# Patient Record
Sex: FEMALE | Race: OTHER | HISPANIC OR LATINO
[De-identification: names, ages, dates, MRNs, and addresses within clinical notes are randomized per-mention and may not be internally consistent; named-entity substitution may affect disease eponyms.]

---

## 2017-01-11 ENCOUNTER — APPOINTMENT (OUTPATIENT)
Dept: OBGYN | Facility: CLINIC | Age: 27
End: 2017-01-11

## 2017-01-11 ENCOUNTER — OUTPATIENT (OUTPATIENT)
Dept: OUTPATIENT SERVICES | Facility: HOSPITAL | Age: 27
LOS: 1 days | End: 2017-01-11
Payer: MEDICAID

## 2017-01-11 DIAGNOSIS — Z01.419 ENCOUNTER FOR GYNECOLOGICAL EXAMINATION (GENERAL) (ROUTINE) WITHOUT ABNORMAL FINDINGS: ICD-10-CM

## 2017-01-11 DIAGNOSIS — N76.0 ACUTE VAGINITIS: ICD-10-CM

## 2017-01-11 PROCEDURE — G0463: CPT

## 2017-02-13 ENCOUNTER — TRANSCRIPTION ENCOUNTER (OUTPATIENT)
Age: 27
End: 2017-02-13

## 2017-04-17 ENCOUNTER — RX RENEWAL (OUTPATIENT)
Age: 27
End: 2017-04-17

## 2017-05-04 ENCOUNTER — APPOINTMENT (OUTPATIENT)
Dept: OPHTHALMOLOGY | Facility: CLINIC | Age: 27
End: 2017-05-04

## 2017-05-09 ENCOUNTER — RX RENEWAL (OUTPATIENT)
Age: 27
End: 2017-05-09

## 2017-05-10 ENCOUNTER — RX RENEWAL (OUTPATIENT)
Age: 27
End: 2017-05-10

## 2017-06-14 ENCOUNTER — RX RENEWAL (OUTPATIENT)
Age: 27
End: 2017-06-14

## 2017-06-26 ENCOUNTER — RX RENEWAL (OUTPATIENT)
Age: 27
End: 2017-06-26

## 2017-06-29 ENCOUNTER — OUTPATIENT (OUTPATIENT)
Dept: OUTPATIENT SERVICES | Facility: HOSPITAL | Age: 27
LOS: 1 days | End: 2017-06-29
Payer: MEDICAID

## 2017-06-29 ENCOUNTER — APPOINTMENT (OUTPATIENT)
Dept: ENDOCRINOLOGY | Facility: HOSPITAL | Age: 27
End: 2017-06-29

## 2017-06-29 VITALS
HEART RATE: 78 BPM | BODY MASS INDEX: 33.13 KG/M2 | HEIGHT: 63 IN | WEIGHT: 187 LBS | RESPIRATION RATE: 14 BRPM | DIASTOLIC BLOOD PRESSURE: 81 MMHG | SYSTOLIC BLOOD PRESSURE: 116 MMHG

## 2017-06-29 DIAGNOSIS — E11.65 TYPE 2 DIABETES MELLITUS WITH HYPERGLYCEMIA: ICD-10-CM

## 2017-06-29 DIAGNOSIS — E06.9 THYROIDITIS, UNSPECIFIED: ICD-10-CM

## 2017-06-29 LAB
CHOLEST SERPL-MCNC: 206 MG/DL — HIGH (ref 10–199)
GLUCOSE BLDC GLUCOMTR-MCNC: 269
HBA1C BLD-MCNC: 11.2 % — HIGH (ref 4–5.6)
HDLC SERPL-MCNC: 40 MG/DL — SIGNIFICANT CHANGE UP (ref 40–125)
LIPID PNL WITH DIRECT LDL SERPL: 121 MG/DL — SIGNIFICANT CHANGE UP
TOTAL CHOLESTEROL/HDL RATIO MEASUREMENT: 5.2 RATIO — SIGNIFICANT CHANGE UP (ref 3.3–7.1)
TRIGL SERPL-MCNC: 225 MG/DL — HIGH (ref 10–149)

## 2017-06-29 PROCEDURE — G0463: CPT

## 2017-06-29 PROCEDURE — 80061 LIPID PANEL: CPT

## 2017-06-29 PROCEDURE — 83036 HEMOGLOBIN GLYCOSYLATED A1C: CPT

## 2017-06-29 PROCEDURE — 82962 GLUCOSE BLOOD TEST: CPT

## 2017-06-29 PROCEDURE — 83498 ASY HYDROXYPROGESTERONE 17-D: CPT

## 2017-06-29 PROCEDURE — 82306 VITAMIN D 25 HYDROXY: CPT

## 2017-06-29 PROCEDURE — 36415 COLL VENOUS BLD VENIPUNCTURE: CPT

## 2017-06-30 LAB — 24R-OH-CALCIDIOL SERPL-MCNC: 34.7 NG/ML — SIGNIFICANT CHANGE UP (ref 30–100)

## 2017-07-05 DIAGNOSIS — E11.9 TYPE 2 DIABETES MELLITUS WITHOUT COMPLICATIONS: ICD-10-CM

## 2017-07-05 DIAGNOSIS — E25.9 ADRENOGENITAL DISORDER, UNSPECIFIED: ICD-10-CM

## 2017-07-06 ENCOUNTER — APPOINTMENT (OUTPATIENT)
Dept: OPHTHALMOLOGY | Facility: CLINIC | Age: 27
End: 2017-07-06

## 2017-07-06 ENCOUNTER — OUTPATIENT (OUTPATIENT)
Dept: OUTPATIENT SERVICES | Facility: HOSPITAL | Age: 27
LOS: 1 days | End: 2017-07-06

## 2017-07-11 LAB — 17OHP SERPL-MCNC: 232 NG/DL — SIGNIFICANT CHANGE UP

## 2017-08-03 ENCOUNTER — APPOINTMENT (OUTPATIENT)
Dept: OPHTHALMOLOGY | Facility: CLINIC | Age: 27
End: 2017-08-03

## 2017-08-07 DIAGNOSIS — H52.10 MYOPIA, UNSPECIFIED EYE: ICD-10-CM

## 2017-09-28 ENCOUNTER — APPOINTMENT (OUTPATIENT)
Dept: ENDOCRINOLOGY | Facility: HOSPITAL | Age: 27
End: 2017-09-28
Payer: MEDICAID

## 2017-09-28 ENCOUNTER — RESULT CHARGE (OUTPATIENT)
Age: 27
End: 2017-09-28

## 2017-09-28 ENCOUNTER — OUTPATIENT (OUTPATIENT)
Dept: OUTPATIENT SERVICES | Facility: HOSPITAL | Age: 27
LOS: 1 days | End: 2017-09-28
Payer: MEDICAID

## 2017-09-28 VITALS
HEART RATE: 80 BPM | WEIGHT: 196 LBS | BODY MASS INDEX: 34.73 KG/M2 | DIASTOLIC BLOOD PRESSURE: 73 MMHG | HEIGHT: 63 IN | SYSTOLIC BLOOD PRESSURE: 119 MMHG | RESPIRATION RATE: 16 BRPM

## 2017-09-28 DIAGNOSIS — E06.9 THYROIDITIS, UNSPECIFIED: ICD-10-CM

## 2017-09-28 DIAGNOSIS — E78.1 PURE HYPERGLYCERIDEMIA: ICD-10-CM

## 2017-09-28 LAB
GLUCOSE BLDC GLUCOMTR-MCNC: 226
HBA1C BLD-MCNC: 11 % — HIGH (ref 4–5.6)

## 2017-09-28 PROCEDURE — 36415 COLL VENOUS BLD VENIPUNCTURE: CPT

## 2017-09-28 PROCEDURE — 83036 HEMOGLOBIN GLYCOSYLATED A1C: CPT

## 2017-09-28 PROCEDURE — 99215 OFFICE O/P EST HI 40 MIN: CPT | Mod: GC

## 2017-09-28 PROCEDURE — 82962 GLUCOSE BLOOD TEST: CPT

## 2017-09-28 PROCEDURE — G0463: CPT

## 2017-09-29 DIAGNOSIS — E78.00 PURE HYPERCHOLESTEROLEMIA, UNSPECIFIED: ICD-10-CM

## 2017-09-29 DIAGNOSIS — E25.0 CONGENITAL ADRENOGENITAL DISORDERS ASSOCIATED WITH ENZYME DEFICIENCY: ICD-10-CM

## 2017-09-29 DIAGNOSIS — E11.65 TYPE 2 DIABETES MELLITUS WITH HYPERGLYCEMIA: ICD-10-CM

## 2017-12-21 ENCOUNTER — RESULT CHARGE (OUTPATIENT)
Age: 27
End: 2017-12-21

## 2017-12-21 ENCOUNTER — OUTPATIENT (OUTPATIENT)
Dept: OUTPATIENT SERVICES | Facility: HOSPITAL | Age: 27
LOS: 1 days | End: 2017-12-21
Payer: MEDICAID

## 2017-12-21 ENCOUNTER — LABORATORY RESULT (OUTPATIENT)
Age: 27
End: 2017-12-21

## 2017-12-21 ENCOUNTER — APPOINTMENT (OUTPATIENT)
Dept: ENDOCRINOLOGY | Facility: HOSPITAL | Age: 27
End: 2017-12-21
Payer: MEDICAID

## 2017-12-21 VITALS
BODY MASS INDEX: 35.44 KG/M2 | DIASTOLIC BLOOD PRESSURE: 77 MMHG | HEIGHT: 63 IN | WEIGHT: 200 LBS | SYSTOLIC BLOOD PRESSURE: 121 MMHG | HEART RATE: 75 BPM

## 2017-12-21 DIAGNOSIS — E06.9 THYROIDITIS, UNSPECIFIED: ICD-10-CM

## 2017-12-21 DIAGNOSIS — E78.00 PURE HYPERCHOLESTEROLEMIA, UNSPECIFIED: ICD-10-CM

## 2017-12-21 DIAGNOSIS — E25.0 CONGENITAL ADRENOGENITAL DISORDERS ASSOCIATED WITH ENZYME DEFICIENCY: ICD-10-CM

## 2017-12-21 DIAGNOSIS — E66.01 MORBID (SEVERE) OBESITY DUE TO EXCESS CALORIES: ICD-10-CM

## 2017-12-21 DIAGNOSIS — E11.65 TYPE 2 DIABETES MELLITUS WITH HYPERGLYCEMIA: ICD-10-CM

## 2017-12-21 LAB
GLUCOSE BLDC GLUCOMTR-MCNC: 92
GLUCOSE BLDC GLUCOMTR-MCNC: 92 MG/DL — SIGNIFICANT CHANGE UP (ref 70–99)

## 2017-12-21 PROCEDURE — 99214 OFFICE O/P EST MOD 30 MIN: CPT | Mod: GC

## 2017-12-21 PROCEDURE — G0463: CPT

## 2017-12-21 PROCEDURE — 82962 GLUCOSE BLOOD TEST: CPT

## 2018-02-23 ENCOUNTER — MEDICATION RENEWAL (OUTPATIENT)
Age: 28
End: 2018-02-23

## 2018-03-29 ENCOUNTER — OUTPATIENT (OUTPATIENT)
Dept: OUTPATIENT SERVICES | Facility: HOSPITAL | Age: 28
LOS: 1 days | End: 2018-03-29
Payer: MEDICAID

## 2018-03-29 ENCOUNTER — APPOINTMENT (OUTPATIENT)
Dept: INTERNAL MEDICINE | Facility: CLINIC | Age: 28
End: 2018-03-29
Payer: MEDICAID

## 2018-03-29 VITALS
DIASTOLIC BLOOD PRESSURE: 70 MMHG | WEIGHT: 205 LBS | BODY MASS INDEX: 36.32 KG/M2 | HEIGHT: 63 IN | SYSTOLIC BLOOD PRESSURE: 120 MMHG

## 2018-03-29 DIAGNOSIS — I10 ESSENTIAL (PRIMARY) HYPERTENSION: ICD-10-CM

## 2018-03-29 LAB
BILIRUB UR QL STRIP: NORMAL
CLARITY UR: CLEAR
COLLECTION METHOD: NORMAL
GLUCOSE UR-MCNC: NORMAL
HBA1C MFR BLD HPLC: 7.6
HCG UR QL: 0.2 EU/DL
HGB UR QL STRIP.AUTO: NORMAL
KETONES UR-MCNC: NORMAL
LEUKOCYTE ESTERASE UR QL STRIP: NEGATIVE
NITRITE UR QL STRIP: POSITIVE
PH UR STRIP: 6
PROT UR STRIP-MCNC: 30
SP GR UR STRIP: 1.02

## 2018-03-29 PROCEDURE — 99213 OFFICE O/P EST LOW 20 MIN: CPT | Mod: GE

## 2018-03-29 PROCEDURE — G0463: CPT

## 2018-03-29 PROCEDURE — 83036 HEMOGLOBIN GLYCOSYLATED A1C: CPT

## 2018-03-29 PROCEDURE — 87186 SC STD MICRODIL/AGAR DIL: CPT

## 2018-03-29 PROCEDURE — 81003 URINALYSIS AUTO W/O SCOPE: CPT

## 2018-03-29 PROCEDURE — 87086 URINE CULTURE/COLONY COUNT: CPT

## 2018-03-30 ENCOUNTER — LABORATORY RESULT (OUTPATIENT)
Age: 28
End: 2018-03-30

## 2018-04-04 DIAGNOSIS — R39.9 UNSPECIFIED SYMPTOMS AND SIGNS INVOLVING THE GENITOURINARY SYSTEM: ICD-10-CM

## 2018-04-04 DIAGNOSIS — E25.0 CONGENITAL ADRENOGENITAL DISORDERS ASSOCIATED WITH ENZYME DEFICIENCY: ICD-10-CM

## 2018-04-04 DIAGNOSIS — E66.01 MORBID (SEVERE) OBESITY DUE TO EXCESS CALORIES: ICD-10-CM

## 2018-04-04 DIAGNOSIS — E11.65 TYPE 2 DIABETES MELLITUS WITH HYPERGLYCEMIA: ICD-10-CM

## 2018-05-01 ENCOUNTER — MEDICATION RENEWAL (OUTPATIENT)
Age: 28
End: 2018-05-01

## 2018-08-17 ENCOUNTER — APPOINTMENT (OUTPATIENT)
Dept: INTERNAL MEDICINE | Facility: CLINIC | Age: 28
End: 2018-08-17

## 2018-09-05 ENCOUNTER — APPOINTMENT (OUTPATIENT)
Dept: OBGYN | Facility: CLINIC | Age: 28
End: 2018-09-05
Payer: MEDICAID

## 2018-09-05 ENCOUNTER — RESULT CHARGE (OUTPATIENT)
Age: 28
End: 2018-09-05

## 2018-09-05 ENCOUNTER — OUTPATIENT (OUTPATIENT)
Dept: OUTPATIENT SERVICES | Facility: HOSPITAL | Age: 28
LOS: 1 days | End: 2018-09-05
Payer: MEDICAID

## 2018-09-05 ENCOUNTER — LABORATORY RESULT (OUTPATIENT)
Age: 28
End: 2018-09-05

## 2018-09-05 VITALS — SYSTOLIC BLOOD PRESSURE: 120 MMHG | WEIGHT: 195 LBS | DIASTOLIC BLOOD PRESSURE: 76 MMHG | BODY MASS INDEX: 34.54 KG/M2

## 2018-09-05 DIAGNOSIS — R39.9 UNSPECIFIED SYMPTOMS AND SIGNS INVOLVING THE GENITOURINARY SYSTEM: ICD-10-CM

## 2018-09-05 DIAGNOSIS — Z30.09 ENCOUNTER FOR OTHER GENERAL COUNSELING AND ADVICE ON CONTRACEPTION: ICD-10-CM

## 2018-09-05 DIAGNOSIS — N76.0 ACUTE VAGINITIS: ICD-10-CM

## 2018-09-05 DIAGNOSIS — Z86.39 PERSONAL HISTORY OF OTHER ENDOCRINE, NUTRITIONAL AND METABOLIC DISEASE: ICD-10-CM

## 2018-09-05 DIAGNOSIS — Z83.3 FAMILY HISTORY OF DIABETES MELLITUS: ICD-10-CM

## 2018-09-05 DIAGNOSIS — Z01.419 ENCOUNTER FOR GYNECOLOGICAL EXAMINATION (GENERAL) (ROUTINE) W/OUT ABNORMAL FINDINGS: ICD-10-CM

## 2018-09-05 PROCEDURE — 81003 URINALYSIS AUTO W/O SCOPE: CPT

## 2018-09-05 PROCEDURE — 99395 PREV VISIT EST AGE 18-39: CPT | Mod: GC,25

## 2018-09-05 PROCEDURE — G0463: CPT

## 2018-09-05 PROCEDURE — 81003 URINALYSIS AUTO W/O SCOPE: CPT | Mod: GC,QW

## 2018-09-08 LAB — CYTOLOGY SPEC DOC CYTO: SIGNIFICANT CHANGE UP

## 2018-09-15 DIAGNOSIS — Z01.419 ENCOUNTER FOR GYNECOLOGICAL EXAMINATION (GENERAL) (ROUTINE) WITHOUT ABNORMAL FINDINGS: ICD-10-CM

## 2018-09-15 DIAGNOSIS — R39.9 UNSPECIFIED SYMPTOMS AND SIGNS INVOLVING THE GENITOURINARY SYSTEM: ICD-10-CM

## 2018-10-11 ENCOUNTER — OUTPATIENT (OUTPATIENT)
Dept: OUTPATIENT SERVICES | Facility: HOSPITAL | Age: 28
LOS: 1 days | End: 2018-10-11
Payer: MEDICAID

## 2018-10-11 ENCOUNTER — APPOINTMENT (OUTPATIENT)
Dept: ENDOCRINOLOGY | Facility: HOSPITAL | Age: 28
End: 2018-10-11
Payer: MEDICAID

## 2018-10-11 VITALS
DIASTOLIC BLOOD PRESSURE: 78 MMHG | HEART RATE: 85 BPM | RESPIRATION RATE: 16 BRPM | SYSTOLIC BLOOD PRESSURE: 115 MMHG | WEIGHT: 194 LBS | HEIGHT: 63 IN | BODY MASS INDEX: 34.38 KG/M2

## 2018-10-11 DIAGNOSIS — E06.9 THYROIDITIS, UNSPECIFIED: ICD-10-CM

## 2018-10-11 LAB
ALBUMIN SERPL ELPH-MCNC: 4.4 G/DL — SIGNIFICANT CHANGE UP (ref 3.3–5)
ALP SERPL-CCNC: 56 U/L — SIGNIFICANT CHANGE UP (ref 30–120)
ALT FLD-CCNC: 33 U/L — SIGNIFICANT CHANGE UP (ref 10–45)
ANION GAP SERPL CALC-SCNC: 14 MMOL/L — SIGNIFICANT CHANGE UP (ref 5–17)
AST SERPL-CCNC: 33 U/L — SIGNIFICANT CHANGE UP (ref 10–40)
BILIRUB SERPL-MCNC: 0.4 MG/DL — SIGNIFICANT CHANGE UP (ref 0.2–1.2)
BUN SERPL-MCNC: 10 MG/DL — SIGNIFICANT CHANGE UP (ref 7–23)
CALCIUM SERPL-MCNC: 9.6 MG/DL — SIGNIFICANT CHANGE UP (ref 8.4–10.5)
CHLORIDE SERPL-SCNC: 102 MMOL/L — SIGNIFICANT CHANGE UP (ref 96–108)
CHOLEST SERPL-MCNC: 198 MG/DL — SIGNIFICANT CHANGE UP (ref 10–199)
CO2 SERPL-SCNC: 23 MMOL/L — SIGNIFICANT CHANGE UP (ref 22–31)
CREAT ?TM UR-MCNC: 453 MG/DL — SIGNIFICANT CHANGE UP
CREAT SERPL-MCNC: 0.78 MG/DL — SIGNIFICANT CHANGE UP (ref 0.5–1.3)
GLUCOSE BLDC GLUCOMTR-MCNC: 103
GLUCOSE BLDC GLUCOMTR-MCNC: 103 MG/DL — HIGH (ref 70–99)
GLUCOSE SERPL-MCNC: 96 MG/DL — SIGNIFICANT CHANGE UP (ref 70–99)
HBA1C BLD-MCNC: 11 % — HIGH (ref 4–5.6)
HDLC SERPL-MCNC: 47 MG/DL — LOW
LIPID PNL WITH DIRECT LDL SERPL: 116 MG/DL — SIGNIFICANT CHANGE UP
MICROALBUMIN UR-MCNC: 7 MG/DL — SIGNIFICANT CHANGE UP
MICROALBUMIN/CREAT UR-RTO: 15 MG/G — SIGNIFICANT CHANGE UP (ref 0–30)
POTASSIUM SERPL-MCNC: 3.8 MMOL/L — SIGNIFICANT CHANGE UP (ref 3.5–5.3)
POTASSIUM SERPL-SCNC: 3.8 MMOL/L — SIGNIFICANT CHANGE UP (ref 3.5–5.3)
PROT SERPL-MCNC: 8.1 G/DL — SIGNIFICANT CHANGE UP (ref 6–8.3)
SODIUM SERPL-SCNC: 139 MMOL/L — SIGNIFICANT CHANGE UP (ref 135–145)
TOTAL CHOLESTEROL/HDL RATIO MEASUREMENT: 4.2 RATIO — SIGNIFICANT CHANGE UP (ref 3.3–7.1)
TRIGL SERPL-MCNC: 176 MG/DL — HIGH (ref 10–149)
TSH SERPL-MCNC: 0.84 UIU/ML — SIGNIFICANT CHANGE UP (ref 0.27–4.2)

## 2018-10-11 PROCEDURE — 80061 LIPID PANEL: CPT

## 2018-10-11 PROCEDURE — 80053 COMPREHEN METABOLIC PANEL: CPT

## 2018-10-11 PROCEDURE — 83036 HEMOGLOBIN GLYCOSYLATED A1C: CPT

## 2018-10-11 PROCEDURE — 99214 OFFICE O/P EST MOD 30 MIN: CPT | Mod: GC

## 2018-10-11 PROCEDURE — G0463: CPT

## 2018-10-11 PROCEDURE — 84443 ASSAY THYROID STIM HORMONE: CPT

## 2018-10-11 PROCEDURE — 82043 UR ALBUMIN QUANTITATIVE: CPT

## 2018-10-11 PROCEDURE — 82962 GLUCOSE BLOOD TEST: CPT

## 2018-10-11 RX ORDER — SULFAMETHOXAZOLE AND TRIMETHOPRIM 400; 80 MG/1; MG/1
400-80 TABLET ORAL DAILY
Qty: 3 | Refills: 0 | Status: DISCONTINUED | COMMUNITY
Start: 2018-09-05 | End: 2018-10-11

## 2018-10-11 RX ORDER — SULFAMETHOXAZOLE AND TRIMETHOPRIM 800; 160 MG/1; MG/1
800-160 TABLET ORAL
Qty: 6 | Refills: 0 | Status: DISCONTINUED | COMMUNITY
Start: 2018-04-06 | End: 2018-10-11

## 2018-10-12 ENCOUNTER — APPOINTMENT (OUTPATIENT)
Dept: INTERNAL MEDICINE | Facility: CLINIC | Age: 28
End: 2018-10-12

## 2018-10-12 DIAGNOSIS — E78.00 PURE HYPERCHOLESTEROLEMIA, UNSPECIFIED: ICD-10-CM

## 2018-10-12 DIAGNOSIS — E11.65 TYPE 2 DIABETES MELLITUS WITH HYPERGLYCEMIA: ICD-10-CM

## 2018-10-12 DIAGNOSIS — E25.0 CONGENITAL ADRENOGENITAL DISORDERS ASSOCIATED WITH ENZYME DEFICIENCY: ICD-10-CM

## 2019-01-06 ENCOUNTER — TRANSCRIPTION ENCOUNTER (OUTPATIENT)
Age: 29
End: 2019-01-06

## 2019-01-10 ENCOUNTER — OUTPATIENT (OUTPATIENT)
Dept: OUTPATIENT SERVICES | Facility: HOSPITAL | Age: 29
LOS: 1 days | End: 2019-01-10
Payer: MEDICAID

## 2019-01-10 ENCOUNTER — APPOINTMENT (OUTPATIENT)
Dept: ENDOCRINOLOGY | Facility: HOSPITAL | Age: 29
End: 2019-01-10
Payer: MEDICAID

## 2019-01-10 VITALS
SYSTOLIC BLOOD PRESSURE: 129 MMHG | BODY MASS INDEX: 34.38 KG/M2 | HEART RATE: 78 BPM | WEIGHT: 194 LBS | HEIGHT: 63 IN | DIASTOLIC BLOOD PRESSURE: 84 MMHG

## 2019-01-10 DIAGNOSIS — E11.65 TYPE 2 DIABETES MELLITUS WITH HYPERGLYCEMIA: ICD-10-CM

## 2019-01-10 DIAGNOSIS — E25.0 CONGENITAL ADRENOGENITAL DISORDERS ASSOCIATED WITH ENZYME DEFICIENCY: ICD-10-CM

## 2019-01-10 DIAGNOSIS — E66.01 MORBID (SEVERE) OBESITY DUE TO EXCESS CALORIES: ICD-10-CM

## 2019-01-10 DIAGNOSIS — E78.00 PURE HYPERCHOLESTEROLEMIA, UNSPECIFIED: ICD-10-CM

## 2019-01-10 LAB
GLUCOSE BLDC GLUCOMTR-MCNC: 165
GLUCOSE BLDC GLUCOMTR-MCNC: 165 MG/DL — HIGH (ref 70–99)
HBA1C BLD-MCNC: 10.6 % — HIGH (ref 4–5.6)

## 2019-01-10 PROCEDURE — G0463: CPT

## 2019-01-10 PROCEDURE — 99214 OFFICE O/P EST MOD 30 MIN: CPT | Mod: GC

## 2019-01-10 PROCEDURE — 83498 ASY HYDROXYPROGESTERONE 17-D: CPT

## 2019-01-10 PROCEDURE — 82962 GLUCOSE BLOOD TEST: CPT

## 2019-01-10 PROCEDURE — 83036 HEMOGLOBIN GLYCOSYLATED A1C: CPT

## 2019-01-10 RX ORDER — METFORMIN ER 500 MG 500 MG/1
500 TABLET ORAL
Qty: 120 | Refills: 3 | Status: ACTIVE | COMMUNITY
Start: 2019-01-10 | End: 1900-01-01

## 2019-01-11 NOTE — HISTORY OF PRESENT ILLNESS
[FreeTextEntry1] : 28 year old woman with T2DM (ketosis prone), Morbid Obesity, NAFLD, NC-CAH presents for follow up.\par \par DM 2- ketosis prone, no complications. Dx at age 21. No complications. Was [facility] in Feb 2015 [facility] with [facility]; A1C then was 16%; had stopped taking her Lantus. At last visit she was on Novolin 70/30 35u with breakfast and 25u with dinner. March 2018 A1C 7.6. She was started on Trulicity 1.5mg weekly. In Oct 2018 A1C 11, She was asked to increase Novolin 70/30 to 40U in AM and 30U in PM. \par FS in -200s Before dinner 180-190s. No hypoglycemia.\par Last ophthal July 2018- no retinopathy. No neuropathy or nephropathy. No macrovascular complications.\par Lost 9 pounds since March. Has started running with her coworker.  \par Patients dad has prostate CA and is trying to gain weight. He does the cooking and she usually has meals that are very carb heavy. \par breakfast:  egg sandwich, cereal\par lunch: chicken noodle soup, rice, chicken\par dinner: red meat, pasta\par No soda or fruit juice. \par BP controlled. Micro/Creat normal in Oct 2018.\par Hyperlipidemia -   June 2017 while on atorvastatin- it was increased to 40mg daily. She says she has not been taking it. \par The patient was diagnosed with NC-CAH by Stim Test in 5/2012 (17-OH after stim >2300). Currently on OCPs for hirsutism and regulation of period. Regular periods. Had last period last week.  \par 17  June 2017.\par Utilizes condoms as well. No plans to get pregnant at this time.

## 2019-01-11 NOTE — ASSESSMENT
[Carbohydrate Consistent Diet] : carbohydrate consistent diet [Ketone Testing] : ketone testing [Diabetes Foot Care] : diabetes foot care [Self Monitoring of Blood Glucose] : self monitoring of blood glucose [Retinopathy Screening] : Patient was referred to ophthalmology for retinopathy screening [FreeTextEntry1] : 29 yo woman with Type 2DM (ketosis prone), obesity and NC-CAH\par \par 1.  Uncontrolled diabetes A1C 11% in Oct 2018\par - Patient is not on any medications to address insulin resistance. \par - She could not tolerate regular metformin in the past but has never tried the ER formulation\par -Will start metformin 500mg ER daily. If tolerating can increase by 500mg every week to maximum dose of 2 tabs (500mg) BID. \par -Continue diet and exercise as recommended.\par -Will check repeat HgA1C today. \par -Increase Novolin 70/30 to 42U in AM and 32U in PM and Trulicity 1.5mg weekly.\par -Check FS and bring meter to next visit. \par \par 2) HTN: Blood pressure at goal, Oct 2018 Micro/Creat normal\par \par 2. HLD\par -Patient was told to restart atorvastatin 40mg daily. Check lipid panel at next visit. \par -She is also on OCP and utilizes condoms. DIscussed that statin is contraindicated in pregnancy and to let us know when she decides to start family planning.\par \par 3. NC-CAH\par -Hirsutism controlled and menstrual periods are regular. Continue Loestrin.  Does not desire pregnancy right now.\par -Check 17OH progesterone. Expect 17OH >200 in [facility]\par  \par \par Return in 3 months, sooner as needed

## 2019-01-11 NOTE — REVIEW OF SYSTEMS
[Polydipsia] : polydipsia [Negative] : Heme/Lymph [All other systems negative] : All other systems negative [Fatigue] : no fatigue [Decreased Appetite] : appetite not decreased [Dry Eyes] : no dryness of the eyes [Eyes Itch] : no itching of the eyes [Shortness Of Breath] : no shortness of breath [Wheezing] : no wheezing was heard

## 2019-01-11 NOTE — PHYSICAL EXAM
[Alert] : alert [No Acute Distress] : no acute distress [Well Nourished] : well nourished [Well Developed] : well developed [Normal Sclera/Conjunctiva] : normal sclera/conjunctiva [EOMI] : extra ocular movement intact [No Proptosis] : no proptosis [Normal Hearing] : hearing was normal [Thyroid Not Enlarged] : the thyroid was not enlarged [No Thyroid Nodules] : there were no palpable thyroid nodules [No Respiratory Distress] : no respiratory distress [Normal Rate and Effort] : normal respiratory rhythm and effort [No Accessory Muscle Use] : no accessory muscle use [Clear to Auscultation] : lungs were clear to auscultation bilaterally [Normal Rate] : heart rate was normal  [Normal S1, S2] : normal S1 and S2 [Regular Rhythm] : with a regular rhythm [Pedal Pulses Normal] : the pedal pulses are present [No Edema] : there was no peripheral edema [Normal Bowel Sounds] : normal bowel sounds [Not Tender] : non-tender [Soft] : abdomen soft [No Stigmata of Cushings Syndrome] : no stigmata of cushings syndrome [No Clubbing, Cyanosis] : no clubbing  or cyanosis of the fingernails [No Rash] : no rash [No Motor Deficits] : the motor exam was normal [No Tremors] : no tremors [Oriented x3] : oriented to person, place, and time [Normal Affect] : the affect was normal [Normal Mood] : the mood was normal [Foot Ulcers] : no foot ulcers [Acanthosis Nigricans] : acanthosis nigricans

## 2019-01-12 LAB — 17OHP SERPL-MCNC: 444 NG/DL — SIGNIFICANT CHANGE UP

## 2019-02-21 LAB
BACTERIA UR CULT: ABNORMAL
BILIRUB UR QL STRIP: NORMAL
GLUCOSE UR-MCNC: 500
HCG UR QL: 0.2 EU/DL
HGB UR QL STRIP.AUTO: NORMAL
KETONES UR-MCNC: NORMAL
LEUKOCYTE ESTERASE UR QL STRIP: NORMAL
NITRITE UR QL STRIP: POSITIVE
PH UR STRIP: 5
PROT UR STRIP-MCNC: 30
SP GR UR STRIP: 1.02

## 2019-04-16 ENCOUNTER — TRANSCRIPTION ENCOUNTER (OUTPATIENT)
Age: 29
End: 2019-04-16

## 2019-04-18 ENCOUNTER — APPOINTMENT (OUTPATIENT)
Dept: ENDOCRINOLOGY | Facility: HOSPITAL | Age: 29
End: 2019-04-18

## 2019-04-30 ENCOUNTER — EMERGENCY (EMERGENCY)
Facility: HOSPITAL | Age: 29
LOS: 1 days | Discharge: HOME | End: 2019-04-30
Payer: MEDICAID

## 2019-04-30 VITALS
OXYGEN SATURATION: 99 % | WEIGHT: 197.09 LBS | TEMPERATURE: 98 F | HEIGHT: 63 IN | DIASTOLIC BLOOD PRESSURE: 86 MMHG | SYSTOLIC BLOOD PRESSURE: 140 MMHG | RESPIRATION RATE: 18 BRPM | HEART RATE: 86 BPM

## 2019-04-30 LAB
APPEARANCE UR: CLEAR — SIGNIFICANT CHANGE UP
BACTERIA # UR AUTO: ABNORMAL
BILIRUB UR-MCNC: NEGATIVE — SIGNIFICANT CHANGE UP
COLOR SPEC: SIGNIFICANT CHANGE UP
DIFF PNL FLD: NEGATIVE — SIGNIFICANT CHANGE UP
EPI CELLS # UR: 6 — HIGH
GLUCOSE UR QL: ABNORMAL
HYALINE CASTS # UR AUTO: 1 /LPF — SIGNIFICANT CHANGE UP (ref 0–7)
KETONES UR-MCNC: NEGATIVE — SIGNIFICANT CHANGE UP
LEUKOCYTE ESTERASE UR-ACNC: NEGATIVE — SIGNIFICANT CHANGE UP
NITRITE UR-MCNC: POSITIVE
PH UR: 6 — SIGNIFICANT CHANGE UP (ref 5–8)
PROT UR-MCNC: NEGATIVE — SIGNIFICANT CHANGE UP
RBC CASTS # UR COMP ASSIST: 1 /HPF — SIGNIFICANT CHANGE UP (ref 0–4)
SP GR SPEC: 1.04 — HIGH (ref 1.01–1.02)
UROBILINOGEN FLD QL: NEGATIVE — SIGNIFICANT CHANGE UP
WBC UR QL: 2 /HPF — SIGNIFICANT CHANGE UP (ref 0–5)

## 2019-04-30 PROCEDURE — 99283 EMERGENCY DEPT VISIT LOW MDM: CPT

## 2019-04-30 RX ORDER — OXYCODONE AND ACETAMINOPHEN 5; 325 MG/1; MG/1
1 TABLET ORAL ONCE
Qty: 0 | Refills: 0 | Status: DISCONTINUED | OUTPATIENT
Start: 2019-04-30 | End: 2019-04-30

## 2019-04-30 RX ORDER — LIDOCAINE 4 G/100G
1 CREAM TOPICAL ONCE
Qty: 0 | Refills: 0 | Status: COMPLETED | OUTPATIENT
Start: 2019-04-30 | End: 2019-04-30

## 2019-04-30 RX ADMIN — OXYCODONE AND ACETAMINOPHEN 1 TABLET(S): 5; 325 TABLET ORAL at 23:40

## 2019-04-30 RX ADMIN — LIDOCAINE 1 PATCH: 4 CREAM TOPICAL at 21:46

## 2019-04-30 NOTE — ED PROVIDER NOTE - PLAN OF CARE
1. Follow with your PMD within 1-2 days. If your pain persists there are spine specialists within the [facility] system you may call [phone] for your back pain, call and arrange your follow up appointment. They may perform further outpatient imaging as clinically warranted.  2. Rest, no heavy lifting.  Warm compresses to area. Perform light walking and gentle stretching exercises. Take Motrin 600 mg every 8 hours for pain with food. For severe breakthru pain take 1 tab Percocet every 6 hours as needed. CAUTION: Percocet contains tylenol so do not take additional tylenol while taking this medication as the maximum daily amount of tylenol is 4000 mg. Additionally Percocet contains oxycodone which can cause drowsiness so do not drive, drink alcohol or operate machinery while taking this medication.   3. Take Keflex as prescribed for your UTI.  4. If you develop any worsening pain, weakness, numbness/tingling, bowel or urinary incontinence, flank pain or any other concerning symptoms please return to the ED immediately.

## 2019-04-30 NOTE — ED PROVIDER NOTE - MUSCULOSKELETAL, MLM
Spine appears normal, range of motion is not limited, no muscle or joint tenderness. No ttp of bony hip, femur or knee. Full AROM at all joints with 5/5 strength. Spine appears normal, range of motion is not limited, no muscle or joint tenderness. No midline spinal tenderness. +mild ttp over L piriformis muscle. No ttp of bony hip, femur or knee. Full AROM at all joints with 5/5 strength.

## 2019-04-30 NOTE — ED PROVIDER NOTE - OBJECTIVE STATEMENT
27 yo female PMhx diabetes presents to the ED c/o right buttock and low back pain w/ radiation down right leg that started on 4/13. Pt cannot recall any inciting injury that prompted onset of pain. 29 yo female PMhx diabetes presents to the ED c/o left buttock and low back pain w/ radiation down left leg that started on 4/13. Pt cannot recall any inciting injury that prompted onset of pain. Pt went to urgent care initially at onset of pain, was prescribed muscle relaxers and anti-inflammatories which improved the pain, however she ran out 3 days ago and pain came back worse than before. Does endorse sitting long periods of sitting down at her job. Pain worsened with sitting and lying down, alleviated with standing and walking. Occasional numbness down back of leg to feet. Denies fall, trauma, weakness, bowel/bladder incontinence, saddle anesthesia, fever/chills, cp, sob, rash, urinary urgency, frequency, dysuria, abdominal pain.

## 2019-04-30 NOTE — ED PROVIDER NOTE - CLINICAL SUMMARY MEDICAL DECISION MAKING FREE TEXT BOX
Impression:  lumbar back pain more consistent with sciatica due to herniated disc w/o H&P features of a more serious etiology such as cord injury, DIANE, Ao pathology, ACS, or acute intraabdominal pathology.    Plan:  pain control with NSAIDs, muscle relaxants, PMD f/u with outpatient MRI if pain persists, strict return precautions.

## 2019-04-30 NOTE — ED PROVIDER NOTE - ATTENDING CONTRIBUTION TO CARE
MD Rahman:  patient seen and evaluated with the PA.  I was present for key portions of the History and Physical, and I agree with the Impression and Plan.    MD Rahman:  27 yo F, c/o L lumbar paraspinal pain that radiates down the L posterior leg.  Duration 2wks.  Onset while bending and twisting.  Better: laying on stomach.  Worse: sneezing/coughing/laughing.  Associated Sx:  No weakness/numbness, no bowel/bladder incontinence, no urinary hesitancy, no saddle anaesthesia, no CP/SOB, no abdominal pain, & no fever/chills.  VS: wnl. Physical Exam: adult F, mild distress, NCAT, PERRL, EOMI, neck supple, CTA B, RRR, Abd: s/nd/nt, Ext: no edema.  Spine: no midline pain, no step-offs, + L lumbar paraspinal muscle spasm,  Strength in BLE 5/5. MSK: no pain in hip with axial load.    Impression:  lumbar back pain more consistent with sciatica due to herniated disc w/o H&P features of a more serious etiology such as cord injury, DIANE, Ao pathology, ACS, or acute intraabdominal pathology.    Plan:  pain control with NSAIDs, muscle relaxants, PMD f/u with outpatient MRI if pain persists, strict return precautions.

## 2019-04-30 NOTE — ED PROVIDER NOTE - CARE PLAN
Principal Discharge DX:	Sciatica of left side  Assessment and plan of treatment:	1. Follow with your PMD within 1-2 days. If your pain persists there are spine specialists within the [facility] system you may call [phone] for your back pain, call and arrange your follow up appointment. They may perform further outpatient imaging as clinically warranted.  2. Rest, no heavy lifting.  Warm compresses to area. Perform light walking and gentle stretching exercises. Take Motrin 600 mg every 8 hours for pain with food. For severe breakthru pain take 1 tab Percocet every 6 hours as needed. CAUTION: Percocet contains tylenol so do not take additional tylenol while taking this medication as the maximum daily amount of tylenol is 4000 mg. Additionally Percocet contains oxycodone which can cause drowsiness so do not drive, drink alcohol or operate machinery while taking this medication.   3. Take Keflex as prescribed for your UTI.  4. If you develop any worsening pain, weakness, numbness/tingling, bowel or urinary incontinence, flank pain or any other concerning symptoms please return to the ED immediately.  Secondary Diagnosis:	Urinary tract infection without hematuria, site unspecified

## 2019-04-30 NOTE — ED PROVIDER NOTE - NSFOLLOWUPINSTRUCTIONS_ED_ALL_ED_FT
1. Follow with your PMD within 1-2 days. If your pain persists there are spine specialists within the [facility] system you may call [phone] for your back pain, call and arrange your follow up appointment. They may perform further outpatient imaging as clinically warranted.  2. Rest, no heavy lifting.  Warm compresses to area. Perform light walking and gentle stretching exercises. Take Motrin 600 mg every 8 hours for pain with food. For severe breakthru pain take 1 tab Percocet every 6 hours as needed. CAUTION: Percocet contains tylenol so do not take additional tylenol while taking this medication as the maximum daily amount of tylenol is 4000 mg. Additionally Percocet contains oxycodone which can cause drowsiness so do not drive, drink alcohol or operate machinery while taking this medication.   3. Take Keflex as prescribed for your UTI.  4. If you develop any worsening pain, weakness, numbness/tingling, bowel or urinary incontinence, flank pain or any other concerning symptoms please return to the ED immediately.

## 2019-04-30 NOTE — ED PROVIDER NOTE - PROGRESS NOTE DETAILS
Pt was offered analgesia at time of initial ED evaluation but refused. - Bay Chavez PA-C MD Rahman:  urine likely contaminated, will resend Pt has no dysuria, no flank pain, no fever/chills. Pain is worsened with movement. Suspicion for pyelo or kidney stone extremely low, however urinalysis x 2 show bacteruria so will treat. Will give first dose in ED and discharge home with keflex and percocet rx and strict return precautions.  Pt has ride home with family member who will drive her. - Bay Chavez PA-C Pt reports feeling much improved s/p percocet. Ambulating without difficulty and ROM markedly improved. Pt has no dysuria, no flank pain, no fever/chills. Pain is worsened with movement. Suspicion for pyelo or kidney stone extremely low, however urinalysis x 2 show bacteruria so will treat. Will give first dose in ED and discharge home with keflex and percocet rx and strict return precautions.  Pt has ride home with family member who will drive her. - Bay Chavez PA-C

## 2019-05-01 VITALS
SYSTOLIC BLOOD PRESSURE: 125 MMHG | OXYGEN SATURATION: 99 % | TEMPERATURE: 98 F | RESPIRATION RATE: 18 BRPM | HEART RATE: 67 BPM | DIASTOLIC BLOOD PRESSURE: 74 MMHG

## 2019-05-01 LAB
APPEARANCE UR: CLEAR — SIGNIFICANT CHANGE UP
BACTERIA # UR AUTO: ABNORMAL
BILIRUB UR-MCNC: NEGATIVE — SIGNIFICANT CHANGE UP
COLOR SPEC: SIGNIFICANT CHANGE UP
DIFF PNL FLD: NEGATIVE — SIGNIFICANT CHANGE UP
EPI CELLS # UR: 2 /HPF — SIGNIFICANT CHANGE UP
GLUCOSE UR QL: ABNORMAL
HYALINE CASTS # UR AUTO: 0 /LPF — SIGNIFICANT CHANGE UP (ref 0–2)
KETONES UR-MCNC: NEGATIVE — SIGNIFICANT CHANGE UP
LEUKOCYTE ESTERASE UR-ACNC: NEGATIVE — SIGNIFICANT CHANGE UP
NITRITE UR-MCNC: POSITIVE
PH UR: 5.5 — SIGNIFICANT CHANGE UP (ref 5–8)
PROT UR-MCNC: NEGATIVE — SIGNIFICANT CHANGE UP
RBC CASTS # UR COMP ASSIST: 3 /HPF — SIGNIFICANT CHANGE UP (ref 0–4)
SP GR SPEC: 1.04 — HIGH (ref 1.01–1.02)
UROBILINOGEN FLD QL: NEGATIVE — SIGNIFICANT CHANGE UP
WBC UR QL: 5 /HPF — SIGNIFICANT CHANGE UP (ref 0–5)

## 2019-05-01 PROCEDURE — 87186 SC STD MICRODIL/AGAR DIL: CPT

## 2019-05-01 PROCEDURE — 99284 EMERGENCY DEPT VISIT MOD MDM: CPT

## 2019-05-01 PROCEDURE — 81001 URINALYSIS AUTO W/SCOPE: CPT

## 2019-05-01 PROCEDURE — 87086 URINE CULTURE/COLONY COUNT: CPT

## 2019-05-01 RX ORDER — CEPHALEXIN 500 MG
1 CAPSULE ORAL
Qty: 10 | Refills: 0 | OUTPATIENT
Start: 2019-05-01 | End: 2019-05-05

## 2019-05-01 RX ORDER — CEPHALEXIN 500 MG
500 CAPSULE ORAL ONCE
Qty: 0 | Refills: 0 | Status: DISCONTINUED | OUTPATIENT
Start: 2019-05-01 | End: 2019-05-01

## 2019-05-01 RX ORDER — CEPHALEXIN 500 MG
500 CAPSULE ORAL ONCE
Qty: 0 | Refills: 0 | Status: COMPLETED | OUTPATIENT
Start: 2019-05-01 | End: 2019-05-01

## 2019-05-01 RX ADMIN — Medication 500 MILLIGRAM(S): at 00:54

## 2019-05-01 NOTE — ED ADULT NURSE NOTE - OBJECTIVE STATEMENT
28y female presents to ED complaining of back pain. Pt is a/ox4 with hx of sciatica was taking muscles relaxers and antiinflammatories since 4/13 with relief, but ran out of a prescription. Pt states pain is worse now than ever, better with ambulation and laying on stomach. Pt states the pain starts in lower back and radiates down L buttock and L leg with intermittent L leg numbness. Pt denies any trauma. Pt breathing spontaneous and unlabored with lungs clear to auscultation bilaterally. Pt skin is warm, dry and intact with no edema present. Pt abdomen soft, nontender, nondistended.. Pt PERRL, with equal strength bilaterally in upper and lower extremities with full sensation. Pt denies chest pain and SOB, denies n/v/d, denies fever/chills and cough, denies dysuria, denies  weakness.

## 2019-05-01 NOTE — ED ADULT NURSE NOTE - CHPI ED NUR SYMPTOMS NEG
no bowel dysfunction/no constipation/no difficulty bearing weight/no bladder dysfunction/no tingling/no neck tenderness/no motor function loss/no fatigue/no anorexia

## 2019-05-01 NOTE — ED ADULT NURSE NOTE - NSIMPLEMENTINTERV_GEN_ALL_ED
Implemented All Universal Safety Interventions:  [place] to call system. Call bell, personal items and telephone within reach. Instruct patient to call for assistance. Room bathroom lighting operational. Non-slip footwear when patient is off stretcher. Physically safe environment: no spills, clutter or unnecessary equipment. Stretcher in lowest position, wheels locked, appropriate side rails in place.

## 2019-05-02 ENCOUNTER — OUTPATIENT (OUTPATIENT)
Dept: OUTPATIENT SERVICES | Facility: HOSPITAL | Age: 29
LOS: 1 days | End: 2019-05-02
Payer: MEDICAID

## 2019-05-02 ENCOUNTER — APPOINTMENT (OUTPATIENT)
Dept: INTERNAL MEDICINE | Facility: CLINIC | Age: 29
End: 2019-05-02
Payer: MEDICAID

## 2019-05-02 ENCOUNTER — RESULT CHARGE (OUTPATIENT)
Age: 29
End: 2019-05-02

## 2019-05-02 VITALS
DIASTOLIC BLOOD PRESSURE: 80 MMHG | HEART RATE: 109 BPM | HEIGHT: 63 IN | OXYGEN SATURATION: 97 % | BODY MASS INDEX: 34.73 KG/M2 | WEIGHT: 196 LBS | SYSTOLIC BLOOD PRESSURE: 123 MMHG

## 2019-05-02 DIAGNOSIS — I10 ESSENTIAL (PRIMARY) HYPERTENSION: ICD-10-CM

## 2019-05-02 DIAGNOSIS — M54.42 LUMBAGO WITH SCIATICA, LEFT SIDE: ICD-10-CM

## 2019-05-02 LAB
-  AMIKACIN: SIGNIFICANT CHANGE UP
-  AMIKACIN: SIGNIFICANT CHANGE UP
-  AMPICILLIN/SULBACTAM: SIGNIFICANT CHANGE UP
-  AMPICILLIN/SULBACTAM: SIGNIFICANT CHANGE UP
-  AMPICILLIN: SIGNIFICANT CHANGE UP
-  AMPICILLIN: SIGNIFICANT CHANGE UP
-  AZTREONAM: SIGNIFICANT CHANGE UP
-  AZTREONAM: SIGNIFICANT CHANGE UP
-  CEFAZOLIN: SIGNIFICANT CHANGE UP
-  CEFAZOLIN: SIGNIFICANT CHANGE UP
-  CEFEPIME: SIGNIFICANT CHANGE UP
-  CEFEPIME: SIGNIFICANT CHANGE UP
-  CEFOXITIN: SIGNIFICANT CHANGE UP
-  CEFOXITIN: SIGNIFICANT CHANGE UP
-  CEFTRIAXONE: SIGNIFICANT CHANGE UP
-  CEFTRIAXONE: SIGNIFICANT CHANGE UP
-  CIPROFLOXACIN: SIGNIFICANT CHANGE UP
-  CIPROFLOXACIN: SIGNIFICANT CHANGE UP
-  ERTAPENEM: SIGNIFICANT CHANGE UP
-  ERTAPENEM: SIGNIFICANT CHANGE UP
-  GENTAMICIN: SIGNIFICANT CHANGE UP
-  GENTAMICIN: SIGNIFICANT CHANGE UP
-  IMIPENEM: SIGNIFICANT CHANGE UP
-  IMIPENEM: SIGNIFICANT CHANGE UP
-  LEVOFLOXACIN: SIGNIFICANT CHANGE UP
-  LEVOFLOXACIN: SIGNIFICANT CHANGE UP
-  MEROPENEM: SIGNIFICANT CHANGE UP
-  MEROPENEM: SIGNIFICANT CHANGE UP
-  NITROFURANTOIN: SIGNIFICANT CHANGE UP
-  NITROFURANTOIN: SIGNIFICANT CHANGE UP
-  PIPERACILLIN/TAZOBACTAM: SIGNIFICANT CHANGE UP
-  PIPERACILLIN/TAZOBACTAM: SIGNIFICANT CHANGE UP
-  TIGECYCLINE: SIGNIFICANT CHANGE UP
-  TIGECYCLINE: SIGNIFICANT CHANGE UP
-  TOBRAMYCIN: SIGNIFICANT CHANGE UP
-  TOBRAMYCIN: SIGNIFICANT CHANGE UP
-  TRIMETHOPRIM/SULFAMETHOXAZOLE: SIGNIFICANT CHANGE UP
-  TRIMETHOPRIM/SULFAMETHOXAZOLE: SIGNIFICANT CHANGE UP
CULTURE RESULTS: SIGNIFICANT CHANGE UP
CULTURE RESULTS: SIGNIFICANT CHANGE UP
METHOD TYPE: SIGNIFICANT CHANGE UP
METHOD TYPE: SIGNIFICANT CHANGE UP
ORGANISM # SPEC MICROSCOPIC CNT: SIGNIFICANT CHANGE UP
SPECIMEN SOURCE: SIGNIFICANT CHANGE UP
SPECIMEN SOURCE: SIGNIFICANT CHANGE UP

## 2019-05-02 PROCEDURE — 83036 HEMOGLOBIN GLYCOSYLATED A1C: CPT

## 2019-05-02 PROCEDURE — 99213 OFFICE O/P EST LOW 20 MIN: CPT | Mod: GE

## 2019-05-02 PROCEDURE — G0463: CPT

## 2019-05-02 NOTE — ED POST DISCHARGE NOTE - RESULT SUMMARY
UCx prelim + >100k GN rods; pt d/c on keflex, will f/u final results and sensitivities. - Haley Andrade PA-C

## 2019-05-03 LAB — HBA1C MFR BLD HPLC: 13.8

## 2019-05-03 NOTE — PHYSICAL EXAM
[No Focal Deficits] : no focal deficits [de-identified] : Patient in acute distress during examination, examination was carried out with patient in the prone position. [de-identified] : Able to walk on toes and on heels

## 2019-05-03 NOTE — REVIEW OF SYSTEMS
[Negative] : Constitutional [Back Pain] : back pain [FreeTextEntry2] : Patient in acute distress during examination, examination was carried out with patient in the prone position. [FreeTextEntry9] : Back pain [de-identified] : Numbness in the left toe

## 2019-05-03 NOTE — HISTORY OF PRESENT ILLNESS
[FreeTextEntry8] : 28 YOF with PMH of T2DM, obesity (BMI 35), and non-classical congenital adrenal hyperplasia presents for a follow up visit after going to the ED/urgent care center for left sided sciatica symptoms. She says that on April 13th, she started to feel back pain after sitting for a long period of time and the pain got worse and she went to an urgent care center. She was prescribed Motrin for the pain and went home with some relief but then the pain got even worse and she had to go to the ED on 4/30. In the ED, she was prescribed 30 tablets of Percocet which she has been taking 1x per day with relief of the pain. She states the pain has been the same and it is worse when sitting for long periods of time and then trying to stand. She denies fevers/chills, saddle anesthesia, urinary/fecal incontinence. Reports numbness in her left toe but no weakness.

## 2019-05-03 NOTE — ASSESSMENT
[FreeTextEntry1] : 28 YOF with PMH of T2DM, obesity (BMI 35), NC-CAH is following up for left sided sciatica symptoms.

## 2019-05-03 NOTE — PLAN
[FreeTextEntry1] : #Sciatica\par - pt with signs and symptoms of left sided sciatica with left toe numbness. No alarm symptoms\par - will hold off on imaging for now\par - referral given for spine surgery if needed\par - prescribed meloxicam 15mg PO [id number]\par \par #T2DM\par - POCT A1c 13%, fill f/u with endocrinology\par \par Case d/w Dr. Gabriel

## 2019-05-07 ENCOUNTER — TRANSCRIPTION ENCOUNTER (OUTPATIENT)
Age: 29
End: 2019-05-07

## 2019-05-08 DIAGNOSIS — M54.42 LUMBAGO WITH SCIATICA, LEFT SIDE: ICD-10-CM

## 2019-05-12 ENCOUNTER — OUTPATIENT (OUTPATIENT)
Dept: OUTPATIENT SERVICES | Facility: HOSPITAL | Age: 29
LOS: 1 days | End: 2019-05-12
Payer: MEDICAID

## 2019-05-12 ENCOUNTER — APPOINTMENT (OUTPATIENT)
Dept: MRI IMAGING | Facility: CLINIC | Age: 29
End: 2019-05-12
Payer: MEDICAID

## 2019-05-12 DIAGNOSIS — Z00.8 ENCOUNTER FOR OTHER GENERAL EXAMINATION: ICD-10-CM

## 2019-05-12 PROCEDURE — 72148 MRI LUMBAR SPINE W/O DYE: CPT

## 2019-05-12 PROCEDURE — 72148 MRI LUMBAR SPINE W/O DYE: CPT | Mod: 26

## 2019-08-13 ENCOUNTER — APPOINTMENT (OUTPATIENT)
Dept: OPHTHALMOLOGY | Facility: CLINIC | Age: 29
End: 2019-08-13

## 2019-11-06 RX ORDER — SYRINGE-NEEDLE,INSULIN,0.5 ML 28GX1/2"
28G X 1/2" SYRINGE, EMPTY DISPOSABLE MISCELLANEOUS
Qty: 1 | Refills: 5 | Status: ACTIVE | COMMUNITY
Start: 2019-05-23 | End: 1900-01-01

## 2019-11-20 ENCOUNTER — APPOINTMENT (OUTPATIENT)
Dept: OBGYN | Facility: CLINIC | Age: 29
End: 2019-11-20

## 2019-12-05 ENCOUNTER — LABORATORY RESULT (OUTPATIENT)
Age: 29
End: 2019-12-05

## 2019-12-05 ENCOUNTER — APPOINTMENT (OUTPATIENT)
Dept: OBGYN | Facility: CLINIC | Age: 29
End: 2019-12-05
Payer: MEDICAID

## 2019-12-05 ENCOUNTER — OUTPATIENT (OUTPATIENT)
Dept: OUTPATIENT SERVICES | Facility: HOSPITAL | Age: 29
LOS: 1 days | End: 2019-12-05
Payer: MEDICAID

## 2019-12-05 VITALS — BODY MASS INDEX: 34.01 KG/M2 | DIASTOLIC BLOOD PRESSURE: 80 MMHG | WEIGHT: 192 LBS | SYSTOLIC BLOOD PRESSURE: 128 MMHG

## 2019-12-05 DIAGNOSIS — Z01.419 ENCOUNTER FOR GYNECOLOGICAL EXAMINATION (GENERAL) (ROUTINE) W/OUT ABNORMAL FINDINGS: ICD-10-CM

## 2019-12-05 DIAGNOSIS — Z30.9 ENCOUNTER FOR CONTRACEPTIVE MANAGEMENT, UNSPECIFIED: ICD-10-CM

## 2019-12-05 DIAGNOSIS — Z11.3 ENCOUNTER FOR SCREENING FOR INFECTIONS WITH A PREDOMINANTLY SEXUAL MODE OF TRANSMISSION: ICD-10-CM

## 2019-12-05 DIAGNOSIS — N76.0 ACUTE VAGINITIS: ICD-10-CM

## 2019-12-05 DIAGNOSIS — Z86.19 PERSONAL HISTORY OF OTHER INFECTIOUS AND PARASITIC DISEASES: ICD-10-CM

## 2019-12-05 DIAGNOSIS — E11.9 TYPE 2 DIABETES MELLITUS W/OUT COMPLICATIONS: ICD-10-CM

## 2019-12-05 PROCEDURE — 87591 N.GONORRHOEAE DNA AMP PROB: CPT

## 2019-12-05 PROCEDURE — 87491 CHLMYD TRACH DNA AMP PROBE: CPT

## 2019-12-05 PROCEDURE — 87624 HPV HI-RISK TYP POOLED RSLT: CPT

## 2019-12-05 PROCEDURE — 36415 COLL VENOUS BLD VENIPUNCTURE: CPT | Mod: NC

## 2019-12-05 PROCEDURE — 86803 HEPATITIS C AB TEST: CPT

## 2019-12-05 PROCEDURE — G0463: CPT

## 2019-12-05 PROCEDURE — 87340 HEPATITIS B SURFACE AG IA: CPT

## 2019-12-05 PROCEDURE — 99214 OFFICE O/P EST MOD 30 MIN: CPT | Mod: NC

## 2019-12-05 PROCEDURE — 36415 COLL VENOUS BLD VENIPUNCTURE: CPT

## 2019-12-05 PROCEDURE — 87389 HIV-1 AG W/HIV-1&-2 AB AG IA: CPT

## 2019-12-05 NOTE — COUNSELING
[Nutrition] : nutrition [Exercise] : exercise [Safe Sexual Practices] : safe sexual practices [STD (testing, results, tx)] : STD (testing, results, tx) [Dental Care] : dental care [Vitamins/Supplements] : vitamins/supplements [Breast Self Exam] : breast self exam

## 2019-12-06 LAB
C TRACH RRNA SPEC QL NAA+PROBE: SIGNIFICANT CHANGE UP
HBV SURFACE AG SER-ACNC: SIGNIFICANT CHANGE UP
HCV AB S/CO SERPL IA: 0.11 S/CO — SIGNIFICANT CHANGE UP (ref 0–0.99)
HCV AB SERPL-IMP: SIGNIFICANT CHANGE UP
HIV 1+2 AB+HIV1 P24 AG SERPL QL IA: SIGNIFICANT CHANGE UP
HPV HIGH+LOW RISK DNA PNL CVX: SIGNIFICANT CHANGE UP
N GONORRHOEA RRNA SPEC QL NAA+PROBE: SIGNIFICANT CHANGE UP
SPECIMEN SOURCE: SIGNIFICANT CHANGE UP

## 2019-12-06 NOTE — PHYSICAL EXAM
[No Lesions] : no genitalia lesions [Labia Majora Erythema] : erythema of the labia majora [Normal] : cervix [Erythema] : erythema [Labia Majora] : labia major [White] : white [Cheesy] : cheesy [Discharge] : a  ~M vaginal discharge was present [Dry Mucosa] : dry mucosa [Pap Obtained] : a Pap smear was performed [Anteversion] : anteverted [No Tenderness] : no rectal tenderness [Nl Sphincter Tone] : normal sphincter tone [Vulvar Atrophy] : no vulvar atrophy [Cystocele] : no cystocele [Rectocele] : no rectocele [Atrophy] : no atrophy [Uterine Prolapse] : no uterine prolapse [Motion Tenderness] : there was no cervical motion tenderness [Tenderness] : nontender [Enlarged ___ wks] : not enlarged [Mass ___ cm] : no uterine mass was palpated [Adnexa Tenderness] : were not tender [Ovarian Mass (___ Cm)] : there were no adnexal masses

## 2019-12-06 NOTE — REVIEW OF SYSTEMS
[Nl] : Integumentary [Fever] : no fever [Feeling Tired] : not feeling tired [Chills] : no chills [Chest Pain] : no chest pain [Palpitations] : no palpitations [Dyspnea] : no shortness of breath [Cough] : no cough [SOB on Exertion] : no shortness of breath during exertion [Abdominal Pain] : no abdominal pain [Constipation] : no constipation [Vomiting] : no vomiting [Incontinence] : no incontinence [Dysuria] : no dysuria [Diarrhea] : no diarrhea [Pelvic Pain] : no pelvic pain [Urgency] : no urgency [Frequency] : no frequency [Abn Vag Bleeding] : no abnormal vaginal bleeding [Joint Stiffness] : no joint stiffness [Joint Pain] : no joint pain [Breast Pain] : no breast pain [Headache] : no headache [Dizziness] : no dizziness [Breast Lump] : no breast lump [Muscle Weakness] : no muscle weakness [Feeling Weak] : no feelings of weakness

## 2019-12-19 LAB — CYTOLOGY SPEC DOC CYTO: SIGNIFICANT CHANGE UP

## 2019-12-23 DIAGNOSIS — Z11.3 ENCOUNTER FOR SCREENING FOR INFECTIONS WITH A PREDOMINANTLY SEXUAL MODE OF TRANSMISSION: ICD-10-CM

## 2019-12-23 DIAGNOSIS — E25.0 CONGENITAL ADRENOGENITAL DISORDERS ASSOCIATED WITH ENZYME DEFICIENCY: ICD-10-CM

## 2019-12-23 DIAGNOSIS — B37.3 CANDIDIASIS OF VULVA AND VAGINA: ICD-10-CM

## 2019-12-23 DIAGNOSIS — Z30.9 ENCOUNTER FOR CONTRACEPTIVE MANAGEMENT, UNSPECIFIED: ICD-10-CM

## 2019-12-23 DIAGNOSIS — E11.9 TYPE 2 DIABETES MELLITUS WITHOUT COMPLICATIONS: ICD-10-CM

## 2020-01-24 ENCOUNTER — TRANSCRIPTION ENCOUNTER (OUTPATIENT)
Age: 30
End: 2020-01-24

## 2020-01-30 ENCOUNTER — OUTPATIENT (OUTPATIENT)
Dept: OUTPATIENT SERVICES | Facility: HOSPITAL | Age: 30
LOS: 1 days | End: 2020-01-30
Payer: MEDICAID

## 2020-01-30 ENCOUNTER — APPOINTMENT (OUTPATIENT)
Dept: ENDOCRINOLOGY | Facility: HOSPITAL | Age: 30
End: 2020-01-30
Payer: MEDICAID

## 2020-01-30 VITALS
HEIGHT: 63 IN | HEART RATE: 81 BPM | SYSTOLIC BLOOD PRESSURE: 115 MMHG | BODY MASS INDEX: 34.02 KG/M2 | WEIGHT: 192 LBS | DIASTOLIC BLOOD PRESSURE: 77 MMHG

## 2020-01-30 DIAGNOSIS — E11.65 TYPE 2 DIABETES MELLITUS WITH HYPERGLYCEMIA: ICD-10-CM

## 2020-01-30 DIAGNOSIS — E78.00 PURE HYPERCHOLESTEROLEMIA, UNSPECIFIED: ICD-10-CM

## 2020-01-30 DIAGNOSIS — E28.2 POLYCYSTIC OVARIAN SYNDROME: ICD-10-CM

## 2020-01-30 DIAGNOSIS — E25.0 CONGENITAL ADRENOGENITAL DISORDERS ASSOCIATED WITH ENZYME DEFICIENCY: ICD-10-CM

## 2020-01-30 DIAGNOSIS — E34.9 ENDOCRINE DISORDER, UNSPECIFIED: ICD-10-CM

## 2020-01-30 LAB
ALBUMIN SERPL ELPH-MCNC: 4.6 G/DL — SIGNIFICANT CHANGE UP (ref 3.3–5)
ALP SERPL-CCNC: 71 U/L — SIGNIFICANT CHANGE UP (ref 40–120)
ALT FLD-CCNC: 21 U/L — SIGNIFICANT CHANGE UP (ref 10–45)
ANION GAP SERPL CALC-SCNC: 12 MMOL/L — SIGNIFICANT CHANGE UP (ref 5–17)
AST SERPL-CCNC: 18 U/L — SIGNIFICANT CHANGE UP (ref 10–40)
BASOPHILS # BLD AUTO: 0.03 K/UL — SIGNIFICANT CHANGE UP (ref 0–0.2)
BASOPHILS NFR BLD AUTO: 0.4 % — SIGNIFICANT CHANGE UP (ref 0–2)
BILIRUB SERPL-MCNC: 0.2 MG/DL — SIGNIFICANT CHANGE UP (ref 0.2–1.2)
BUN SERPL-MCNC: 11 MG/DL — SIGNIFICANT CHANGE UP (ref 7–23)
CALCIUM SERPL-MCNC: 9.7 MG/DL — SIGNIFICANT CHANGE UP (ref 8.4–10.5)
CHLORIDE SERPL-SCNC: 103 MMOL/L — SIGNIFICANT CHANGE UP (ref 96–108)
CHOLEST SERPL-MCNC: 226 MG/DL — HIGH (ref 10–199)
CO2 SERPL-SCNC: 24 MMOL/L — SIGNIFICANT CHANGE UP (ref 22–31)
CREAT ?TM UR-MCNC: 201 MG/DL — SIGNIFICANT CHANGE UP
CREAT SERPL-MCNC: 0.58 MG/DL — SIGNIFICANT CHANGE UP (ref 0.5–1.3)
EOSINOPHIL # BLD AUTO: 0.05 K/UL — SIGNIFICANT CHANGE UP (ref 0–0.5)
EOSINOPHIL NFR BLD AUTO: 0.7 % — SIGNIFICANT CHANGE UP (ref 0–6)
ESTIMATED AVERAGE GLUCOSE: 280 MG/DL — HIGH (ref 68–114)
FRUCTOSAMINE SERPL-MCNC: 375 UMOL/L — HIGH (ref 205–285)
GLUCOSE BLDC GLUCOMTR-MCNC: 151
GLUCOSE BLDC GLUCOMTR-MCNC: 151 MG/DL — HIGH (ref 70–99)
GLUCOSE SERPL-MCNC: 116 MG/DL — HIGH (ref 70–99)
HBA1C BLD-MCNC: 11.4 % — HIGH (ref 4–5.6)
HCT VFR BLD CALC: 41.5 % — SIGNIFICANT CHANGE UP (ref 34.5–45)
HDLC SERPL-MCNC: 46 MG/DL — LOW
HGB BLD-MCNC: 13.3 G/DL — SIGNIFICANT CHANGE UP (ref 11.5–15.5)
IMM GRANULOCYTES NFR BLD AUTO: 0.4 % — SIGNIFICANT CHANGE UP (ref 0–1.5)
LIPID PNL WITH DIRECT LDL SERPL: 128 MG/DL — HIGH
LYMPHOCYTES # BLD AUTO: 2.58 K/UL — SIGNIFICANT CHANGE UP (ref 1–3.3)
LYMPHOCYTES # BLD AUTO: 34.2 % — SIGNIFICANT CHANGE UP (ref 13–44)
MCHC RBC-ENTMCNC: 27.7 PG — SIGNIFICANT CHANGE UP (ref 27–34)
MCHC RBC-ENTMCNC: 32 GM/DL — SIGNIFICANT CHANGE UP (ref 32–36)
MCV RBC AUTO: 86.3 FL — SIGNIFICANT CHANGE UP (ref 80–100)
MICROALBUMIN UR-MCNC: 4.6 MG/DL — SIGNIFICANT CHANGE UP
MICROALBUMIN/CREAT UR-RTO: 23 MG/G — SIGNIFICANT CHANGE UP (ref 0–30)
MONOCYTES # BLD AUTO: 0.65 K/UL — SIGNIFICANT CHANGE UP (ref 0–0.9)
MONOCYTES NFR BLD AUTO: 8.6 % — SIGNIFICANT CHANGE UP (ref 2–14)
NEUTROPHILS # BLD AUTO: 4.21 K/UL — SIGNIFICANT CHANGE UP (ref 1.8–7.4)
NEUTROPHILS NFR BLD AUTO: 55.7 % — SIGNIFICANT CHANGE UP (ref 43–77)
PLATELET # BLD AUTO: 376 K/UL — SIGNIFICANT CHANGE UP (ref 150–400)
POTASSIUM SERPL-MCNC: 4.5 MMOL/L — SIGNIFICANT CHANGE UP (ref 3.5–5.3)
POTASSIUM SERPL-SCNC: 4.5 MMOL/L — SIGNIFICANT CHANGE UP (ref 3.5–5.3)
PROT SERPL-MCNC: 7.8 G/DL — SIGNIFICANT CHANGE UP (ref 6–8.3)
RBC # BLD: 4.81 M/UL — SIGNIFICANT CHANGE UP (ref 3.8–5.2)
RBC # FLD: 12.7 % — SIGNIFICANT CHANGE UP (ref 10.3–14.5)
SODIUM SERPL-SCNC: 138 MMOL/L — SIGNIFICANT CHANGE UP (ref 135–145)
TOTAL CHOLESTEROL/HDL RATIO MEASUREMENT: 4.9 RATIO — SIGNIFICANT CHANGE UP (ref 3.3–7.1)
TRIGL SERPL-MCNC: 260 MG/DL — HIGH (ref 10–149)
WBC # BLD: 7.55 K/UL — SIGNIFICANT CHANGE UP (ref 3.8–10.5)
WBC # FLD AUTO: 7.55 K/UL — SIGNIFICANT CHANGE UP (ref 3.8–10.5)

## 2020-01-30 PROCEDURE — 82962 GLUCOSE BLOOD TEST: CPT

## 2020-01-30 PROCEDURE — 82043 UR ALBUMIN QUANTITATIVE: CPT

## 2020-01-30 PROCEDURE — 99215 OFFICE O/P EST HI 40 MIN: CPT | Mod: GC

## 2020-01-30 PROCEDURE — 80053 COMPREHEN METABOLIC PANEL: CPT

## 2020-01-30 PROCEDURE — 83036 HEMOGLOBIN GLYCOSYLATED A1C: CPT

## 2020-01-30 PROCEDURE — G0463: CPT

## 2020-01-30 PROCEDURE — 82985 ASSAY OF GLYCATED PROTEIN: CPT

## 2020-01-30 PROCEDURE — 80061 LIPID PANEL: CPT

## 2020-01-30 RX ORDER — MELOXICAM 15 MG/1
15 TABLET ORAL
Qty: 10 | Refills: 0 | Status: DISCONTINUED | COMMUNITY
Start: 2019-05-02 | End: 2020-01-30

## 2020-01-30 RX ORDER — TERCONAZOLE 4 MG/G
0.4 CREAM VAGINAL
Qty: 1 | Refills: 1 | Status: DISCONTINUED | COMMUNITY
Start: 2019-12-05 | End: 2020-01-30

## 2020-01-30 RX ORDER — FLUCONAZOLE 150 MG/1
150 TABLET ORAL
Qty: 1 | Refills: 1 | Status: DISCONTINUED | COMMUNITY
Start: 2019-12-05 | End: 2020-01-30

## 2020-01-31 NOTE — PHYSICAL EXAM
[Alert] : alert [No Acute Distress] : no acute distress [Well Nourished] : well nourished [Normal Sclera/Conjunctiva] : normal sclera/conjunctiva [Well Developed] : well developed [Normal Hearing] : hearing was normal [No Proptosis] : no proptosis [Thyroid Not Enlarged] : the thyroid was not enlarged [No Thyroid Nodules] : there were no palpable thyroid nodules [No Accessory Muscle Use] : no accessory muscle use [Clear to Auscultation] : lungs were clear to auscultation bilaterally [Normal S1, S2] : normal S1 and S2 [Normal Rate] : heart rate was normal  [Pedal Pulses Normal] : the pedal pulses are present [Regular Rhythm] : with a regular rhythm [Normal Bowel Sounds] : normal bowel sounds [No Edema] : there was no peripheral edema [Not Tender] : non-tender [Soft] : abdomen soft [No Stigmata of Cushings Syndrome] : no stigmata of cushings syndrome [No Clubbing, Cyanosis] : no clubbing  or cyanosis of the fingernails [No Rash] : no rash [No Motor Deficits] : the motor exam was normal [No Tremors] : no tremors [Oriented x3] : oriented to person, place, and time [Normal Mood] : the mood was normal [Normal Affect] : the affect was normal [Right Foot Was Examined] : right foot ~C was examined [Left Foot Was Examined] : left foot ~C was examined [Normal] : normal [Full ROM] : with full range of motion [2+] : 2+ in the dorsalis pedis [Foot Ulcers] : no foot ulcers [Acanthosis Nigricans] : no acanthosis nigricans [#3 Diminished] : number 3 was normal [#1 Diminished] : number 1 was normal [#2 Diminished] : number 2 was normal [#4 Diminished] : number 4 was normal [#7 Diminished] : number 7 was normal [#5 Diminished] : number 5 was normal [#6 Diminished] : number 6 was normal [#8 Diminished] : number 8 was normal [#9 Diminished] : number 9 was normal [#10 Diminished] : number 10 was normal [de-identified] : obese  [de-identified] : no lipodystrophy  [FreeTextEntry6] : left fifth toe callus

## 2020-01-31 NOTE — HISTORY OF PRESENT ILLNESS
[FreeTextEntry1] : 29 year old woman with T2DM (ketosis prone), Morbid Obesity, NAFLD, NC-CAH presents for follow up.\par \par DM 2- ketosis prone. Dx at age 21. No complications. Was [facility] in Feb [facility] with [facility]; A1C then was 16%; had stopped taking her Lantus. Then she was on Novolin 70/30 35u with breakfast and 25u with dinner. March 2018 A1C 7.6. She was started on Trulicity 1.5mg weekly. In Oct 2018 A1C 11, She was asked to increase Novolin 70/30 to 40U in AM and 30U in PM. At her last visit in Jan 2019, Novolin 70/30 was increased to 42U in AM and 32U in PM and metformin  mg was added and was instructed to gradually up titrate. Patient did not came for follow up since then as she was found to have herniated disc and says was home bound and undergoing physical therapy. She even got a steroid shot in June 2019. Was seen by medicine clinic in May 2019, A1c was noted to be 13.8%, . Was continued on same regimen of insulin. \par \par Currently taking Novolin 70/30 40 units in am and 30 units in pm. Says she never received prescription for metformin. \par Have not been checking sugars regularly, but says its been ranging high whenever she checks it. FS in AM 200s  Before dinner : 300s. No hypoglycemia or hypoglycemic symptoms reported but endorse having headache if her sugar are in normal range (likely as she is adapted to the state of hyperglycemia)\par Last ophthal June 2019- no retinopathy. No nephropathy. No macrovascular complications. Reports pain and numbness only in her left fifth toe but says its secondary to herniated disc and its been getting better. \par Stable weight. Eating 3 regular meals a day. 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 \par breakfast:  egg sandwich, cereal (honey and cherrios) \par lunch: rice (tries to limit), meat, vegetables \par dinner: red meat, pasta\par Consumes alcohol, soda or fruit juice once in a while.  \par \par BP controlled. Micro/Creat normal in Oct 2018.\par \par Hyperlipidemia - Total cholesterol 198, Triglycerides 176, HDL 47,   Oct 2018. Says she is taking atorvastatin 40 mg qhs. \par \par The patient was diagnosed with NC-CAH by Stim Test in 5/2012 (17-OH after stim >2300). Currently on OCPs for hirsutism and regulation of period. Reports symptoms being well controlled, having regular periods. \par 17  Jan 2019.\par She is sexually active, utilizes condoms as well. No plans to get pregnant at this time.

## 2020-01-31 NOTE — REVIEW OF SYSTEMS
[All other systems negative] : All other systems negative [Pain/Numbness of Digits] : pain/numbness of digits [Negative] : Psychiatric [Fatigue] : no fatigue [Recent Weight Gain (___ Lbs)] : no recent weight gain [Recent Weight Loss (___ Lbs)] : no recent weight loss [Blurry Vision] : no blurred vision [Dry Eyes] : no dryness of the eyes [Eyes Itch] : no itching of the eyes [Eye Pain] : no eye pain [Dysphagia] : no dysphagia [Neck Pain] : no neck pain [Shortness Of Breath] : no shortness of breath [Palpitations] : no palpitations [Chest Pain] : no chest pain [Cough] : no cough [Nausea] : no nausea [Constipation] : no constipation [Diarrhea] : no diarrhea [Vomiting] : no vomiting was observed [Abdominal Pain] : no abdominal pain [Irregular Menses] : regular menses [Polyuria] : no polyuria [Tremors] : no tremors [Headache] : no headaches [Polydipsia] : no polydipsia [Heat Intolerance] : heat tolerant [Cold Intolerance] : cold tolerant

## 2020-01-31 NOTE — ASSESSMENT
[Carbohydrate Consistent Diet] : carbohydrate consistent diet [Ketone Testing] : ketone testing [Diabetes Foot Care] : diabetes foot care [Self Monitoring of Blood Glucose] : self monitoring of blood glucose [Retinopathy Screening] : Patient was referred to ophthalmology for retinopathy screening [Hypoglycemia Management] : hypoglycemia management [Long Term Vascular Complications] : long term vascular complications of diabetes [Importance of Diet and Exercise] : importance of diet and exercise to improve glycemic control, achieve weight loss and improve cardiovascular health [Action and use of Insulin] : action and use of short and long-acting insulin [Insulin Self-Administration] : insulin self-administration [FreeTextEntry1] : 28 yo woman with Type 2 DM (ketosis prone), obesity and NC-CAH\par \par 1. Uncontrolled diabetes mellitus type 2 (ketosis prone)\par - A1C 13.8% in may 2019, recheck today along with fructosamine and CMP. \par - Patient have not been checking her FS consistently, so reinforced the importance of FS monitoring daily and recommended to check 2-3 times a day and to keep a log to bring at next visit. \par - Increase Novolin 70/30 to 44 U in AM and 32 U in PM. Will make further changes based on FS log, asked to call clinic in 2 weeks to discuss the readings. \par - Continue Trulicity 1.5 mg qweekly.\par - Will restart metformin but ER formulation as she could not tolerate the regular metformin in past, recommended to start metformin  mg 1 tab qd , if tolerates well she should uptitrate to 500 mg 1 tab bid in 3 days , then 500 mg 2 tab in am and 1 tab in pm and then finally 500 mg ER 2 tabs bid. \par - Counseled about diet, exercise and weight loss\par - Routine opthalmology follow up\par - Foot exam done in clinic today, noted to have left fifth toe callus, recommended to see podiatry but patient declined. Advise not to walk bare foot and counseled about diabetes foot care. \par \par 2) HTN: \par - Blood pressure at goal, <130/80, not requiring any medications\par - Oct 2018 urine Micro/Creat normal, recheck today\par \par 3. HLD\par - LDL goal is <70\par - Lipid profile from Oct 2018: Total cholesterol 198, Triglycerides 176, HDL 47,  \par - Recheck lipid profile today\par - Continue with  atorvastatin 40 mg qhs, not planning pregnancy in neat furture. \par \par 4. NC-CAH\par - Hirsutism controlled and menstrual periods are regular. Continue OCPs.  Does not desire pregnancy right now.\par - 17OH 444 Jan 2019.\par  \par Return in 3 months.\par \par Seen and discussed with Dr. Dumont.

## 2020-04-21 ENCOUNTER — RX RENEWAL (OUTPATIENT)
Age: 30
End: 2020-04-21

## 2020-05-07 ENCOUNTER — APPOINTMENT (OUTPATIENT)
Dept: ENDOCRINOLOGY | Facility: HOSPITAL | Age: 30
End: 2020-05-07

## 2020-05-07 RX ORDER — DULAGLUTIDE 1.5 MG/.5ML
1.5 INJECTION, SOLUTION SUBCUTANEOUS
Qty: 1 | Refills: 3 | Status: ACTIVE | COMMUNITY
Start: 2017-09-28 | End: 1900-01-01

## 2020-05-07 RX ORDER — METFORMIN ER 500 MG 500 MG/1
500 TABLET ORAL TWICE DAILY
Qty: 360 | Refills: 3 | Status: ACTIVE | COMMUNITY
Start: 2020-01-30 | End: 1900-01-01

## 2020-05-07 RX ORDER — ATORVASTATIN CALCIUM 40 MG/1
40 TABLET, FILM COATED ORAL
Qty: 90 | Refills: 2 | Status: DISCONTINUED | COMMUNITY
Start: 2019-01-10 | End: 2020-05-07

## 2020-05-07 RX ORDER — ATORVASTATIN CALCIUM 80 MG/1
80 TABLET, FILM COATED ORAL DAILY
Qty: 90 | Refills: 3 | Status: ACTIVE | COMMUNITY
Start: 2020-05-07 | End: 1900-01-01

## 2020-08-11 ENCOUNTER — APPOINTMENT (OUTPATIENT)
Dept: OBGYN | Facility: CLINIC | Age: 30
End: 2020-08-11
Payer: MEDICAID

## 2020-08-11 ENCOUNTER — OUTPATIENT (OUTPATIENT)
Dept: OUTPATIENT SERVICES | Facility: HOSPITAL | Age: 30
LOS: 1 days | End: 2020-08-11
Payer: MEDICAID

## 2020-08-11 VITALS
DIASTOLIC BLOOD PRESSURE: 62 MMHG | WEIGHT: 172.25 LBS | BODY MASS INDEX: 30.51 KG/M2 | SYSTOLIC BLOOD PRESSURE: 100 MMHG

## 2020-08-11 DIAGNOSIS — N92.6 IRREGULAR MENSTRUATION, UNSPECIFIED: ICD-10-CM

## 2020-08-11 PROCEDURE — G0463: CPT

## 2020-08-11 PROCEDURE — 99213 OFFICE O/P EST LOW 20 MIN: CPT | Mod: GC

## 2020-08-11 RX ORDER — NORGESTIMATE AND ETHINYL ESTRADIOL 0.25-0.035
0.25-35 KIT ORAL
Qty: 3 | Refills: 0 | Status: ACTIVE | COMMUNITY
Start: 2020-08-11 | End: 1900-01-01

## 2020-08-16 NOTE — PHYSICAL EXAM
[Awake] : awake [Soft] : soft [Alert] : alert [Oriented x3] : oriented to person, place, and time [Labia Minora] : labia minora [Labia Majora] : labia major [Pink Rugae] : pink rugae [Normal] : clitoris [Discharge] : a  ~M vaginal discharge was present [No Bleeding] : there was no active vaginal bleeding [Acute Distress] : no acute distress [Tender] : non tender [Distended] : not distended [Depressed Mood] : not depressed [Labia Minora Erythema] : no erythema of the labia minora [Labia Majora Erythema] : no erythema of the labia majora [Erythema] : no erythema [Tenderness] : no tenderness

## 2020-08-20 ENCOUNTER — APPOINTMENT (OUTPATIENT)
Dept: OBGYN | Facility: CLINIC | Age: 30
End: 2020-08-20
Payer: MEDICAID

## 2020-08-20 ENCOUNTER — OUTPATIENT (OUTPATIENT)
Dept: OUTPATIENT SERVICES | Facility: HOSPITAL | Age: 30
LOS: 1 days | End: 2020-08-20
Payer: MEDICAID

## 2020-08-20 VITALS
DIASTOLIC BLOOD PRESSURE: 70 MMHG | HEIGHT: 63 IN | WEIGHT: 170 LBS | SYSTOLIC BLOOD PRESSURE: 118 MMHG | BODY MASS INDEX: 30.12 KG/M2

## 2020-08-20 DIAGNOSIS — N76.0 ACUTE VAGINITIS: ICD-10-CM

## 2020-08-20 DIAGNOSIS — N90.60 UNSPECIFIED HYPERTROPHY OF VULVA: ICD-10-CM

## 2020-08-20 PROCEDURE — G0463: CPT

## 2020-08-20 PROCEDURE — 99214 OFFICE O/P EST MOD 30 MIN: CPT | Mod: NC

## 2020-08-22 NOTE — PHYSICAL EXAM
[Labia Majora] : labia major [Labia Minora] : right labia minora [Normal] : clitoris [No Bleeding] : there was no active vaginal bleeding [Normal Position] : in a normal position [Discharge] : had no discharge [Motion Tenderness] : there was no cervical motion tenderness [Pap Obtained] : a Pap smear was not performed [Tenderness] : nontender [Enlarged ___ wks] : not enlarged [Mass ___ cm] : no uterine mass was palpated [Adnexa Tenderness] : were not tender [FreeTextEntry4] : POS bartholins firm hard  nontender  nonerythematous   [de-identified] : markedly enlarged R  labia minira not swollen  minmal tenderness  PAlpable  Barhlins cyst firm haerd 1.5 cm  R  introital are non tender  no erythema noted

## 2020-08-22 NOTE — COUNSELING
[Nutrition] : nutrition [Breast Self Exam] : breast self exam [Exercise] : exercise [FreeTextEntry2] : Supportive care reviewed for Perineum

## 2020-08-25 DIAGNOSIS — N92.6 IRREGULAR MENSTRUATION, UNSPECIFIED: ICD-10-CM

## 2020-08-29 DIAGNOSIS — E11.65 TYPE 2 DIABETES MELLITUS WITH HYPERGLYCEMIA: ICD-10-CM

## 2020-08-29 DIAGNOSIS — E25.0 CONGENITAL ADRENOGENITAL DISORDERS ASSOCIATED WITH ENZYME DEFICIENCY: ICD-10-CM

## 2020-08-29 DIAGNOSIS — N75.0 CYST OF BARTHOLIN'S GLAND: ICD-10-CM

## 2020-09-03 ENCOUNTER — APPOINTMENT (OUTPATIENT)
Dept: OBGYN | Facility: CLINIC | Age: 30
End: 2020-09-03
Payer: MEDICAID

## 2020-09-03 ENCOUNTER — OUTPATIENT (OUTPATIENT)
Dept: OUTPATIENT SERVICES | Facility: HOSPITAL | Age: 30
LOS: 1 days | End: 2020-09-03
Payer: MEDICAID

## 2020-09-03 VITALS — WEIGHT: 163 LBS | BODY MASS INDEX: 28.87 KG/M2 | SYSTOLIC BLOOD PRESSURE: 110 MMHG | DIASTOLIC BLOOD PRESSURE: 70 MMHG

## 2020-09-03 DIAGNOSIS — Z86.19 PERSONAL HISTORY OF OTHER INFECTIOUS AND PARASITIC DISEASES: ICD-10-CM

## 2020-09-03 DIAGNOSIS — N76.0 ACUTE VAGINITIS: ICD-10-CM

## 2020-09-03 DIAGNOSIS — N75.0 CYST OF BARTHOLIN'S GLAND: ICD-10-CM

## 2020-09-03 PROCEDURE — G0463: CPT

## 2020-09-03 PROCEDURE — 99213 OFFICE O/P EST LOW 20 MIN: CPT | Mod: NC

## 2020-09-03 RX ORDER — FLUCONAZOLE 150 MG/1
150 TABLET ORAL
Qty: 3 | Refills: 3 | Status: ACTIVE | COMMUNITY
Start: 2020-09-03 | End: 1900-01-01

## 2020-09-07 NOTE — PHYSICAL EXAM
[Motion Tenderness] : there was no cervical motion tenderness [de-identified] : residual indurated Rt bartholins palpable-  non tender.  [FreeTextEntry4] : +white thick curd d/c

## 2020-09-13 DIAGNOSIS — B37.3 CANDIDIASIS OF VULVA AND VAGINA: ICD-10-CM

## 2020-09-13 DIAGNOSIS — N75.0 CYST OF BARTHOLIN'S GLAND: ICD-10-CM

## 2020-10-08 ENCOUNTER — APPOINTMENT (OUTPATIENT)
Dept: ENDOCRINOLOGY | Facility: HOSPITAL | Age: 30
End: 2020-10-08

## 2020-12-03 NOTE — HISTORY OF PRESENT ILLNESS
Spoke to pt and booked pre-op   [Normal Amount/Duration] : was of a normal amount and duration [Definite:  ___ (Date)] : the last menstrual period was [unfilled] [Regular Cycle Intervals] : periods have been regular [Menstrual Cramps] : menstrual cramps [Monogamous] : is monogamous [Sexually Active] : is sexually active [Contraception] : uses contraception [Oral Contraceptives] : uses oral contraceptives [Male ___] : [unfilled] male [Spotting Between  Menses] : no spotting between menses [Currently In Menopause] : not currently in menopause [Experiencing Menopausal Sxs] : not experiencing menopausal symptoms

## 2020-12-10 ENCOUNTER — APPOINTMENT (OUTPATIENT)
Dept: ENDOCRINOLOGY | Facility: HOSPITAL | Age: 30
End: 2020-12-10
Payer: MEDICAID

## 2020-12-10 ENCOUNTER — NON-APPOINTMENT (OUTPATIENT)
Age: 30
End: 2020-12-10

## 2020-12-10 ENCOUNTER — OUTPATIENT (OUTPATIENT)
Dept: OUTPATIENT SERVICES | Facility: HOSPITAL | Age: 30
LOS: 1 days | End: 2020-12-10
Payer: MEDICAID

## 2020-12-10 ENCOUNTER — RESULT CHARGE (OUTPATIENT)
Age: 30
End: 2020-12-10

## 2020-12-10 VITALS
HEART RATE: 88 BPM | WEIGHT: 170 LBS | TEMPERATURE: 97.3 F | DIASTOLIC BLOOD PRESSURE: 79 MMHG | BODY MASS INDEX: 30.12 KG/M2 | HEIGHT: 63 IN | SYSTOLIC BLOOD PRESSURE: 120 MMHG

## 2020-12-10 DIAGNOSIS — E11.65 TYPE 2 DIABETES MELLITUS WITH HYPERGLYCEMIA: ICD-10-CM

## 2020-12-10 DIAGNOSIS — E11.9 TYPE 2 DIABETES MELLITUS WITHOUT COMPLICATIONS: ICD-10-CM

## 2020-12-10 DIAGNOSIS — E25.0 CONGENITAL ADRENOGENITAL DISORDERS ASSOCIATED WITH ENZYME DEFICIENCY: ICD-10-CM

## 2020-12-10 DIAGNOSIS — E78.00 PURE HYPERCHOLESTEROLEMIA, UNSPECIFIED: ICD-10-CM

## 2020-12-10 LAB
24R-OH-CALCIDIOL SERPL-MCNC: 37.3 NG/ML — SIGNIFICANT CHANGE UP (ref 30–80)
A1C WITH ESTIMATED AVERAGE GLUCOSE RESULT: 14.8 % — HIGH (ref 4–5.6)
ALBUMIN SERPL ELPH-MCNC: 4.1 G/DL — SIGNIFICANT CHANGE UP (ref 3.3–5)
ALP SERPL-CCNC: 78 U/L — SIGNIFICANT CHANGE UP (ref 40–120)
ALT FLD-CCNC: 16 U/L — SIGNIFICANT CHANGE UP (ref 10–45)
ANION GAP SERPL CALC-SCNC: 10 MMOL/L — SIGNIFICANT CHANGE UP (ref 5–17)
AST SERPL-CCNC: 18 U/L — SIGNIFICANT CHANGE UP (ref 10–40)
BASOPHILS # BLD AUTO: 0.04 K/UL — SIGNIFICANT CHANGE UP (ref 0–0.2)
BASOPHILS NFR BLD AUTO: 0.6 % — SIGNIFICANT CHANGE UP (ref 0–2)
BILIRUB SERPL-MCNC: 0.2 MG/DL — SIGNIFICANT CHANGE UP (ref 0.2–1.2)
BUN SERPL-MCNC: 10 MG/DL — SIGNIFICANT CHANGE UP (ref 7–23)
CALCIUM SERPL-MCNC: 9.3 MG/DL — SIGNIFICANT CHANGE UP (ref 8.4–10.5)
CHLORIDE SERPL-SCNC: 102 MMOL/L — SIGNIFICANT CHANGE UP (ref 96–108)
CHOLEST SERPL-MCNC: 295 MG/DL — HIGH
CO2 SERPL-SCNC: 24 MMOL/L — SIGNIFICANT CHANGE UP (ref 22–31)
CREAT SERPL-MCNC: 0.61 MG/DL — SIGNIFICANT CHANGE UP (ref 0.5–1.3)
EOSINOPHIL # BLD AUTO: 0.04 K/UL — SIGNIFICANT CHANGE UP (ref 0–0.5)
EOSINOPHIL NFR BLD AUTO: 0.6 % — SIGNIFICANT CHANGE UP (ref 0–6)
ESTIMATED AVERAGE GLUCOSE: 378 MG/DL — HIGH (ref 68–114)
GLUCOSE BLDC GLUCOMTR-MCNC: 214
GLUCOSE BLDC GLUCOMTR-MCNC: 214 MG/DL — HIGH (ref 70–99)
GLUCOSE SERPL-MCNC: 181 MG/DL — HIGH (ref 70–99)
HCT VFR BLD CALC: 40.8 % — SIGNIFICANT CHANGE UP (ref 34.5–45)
HDLC SERPL-MCNC: 57 MG/DL — SIGNIFICANT CHANGE UP
HGB BLD-MCNC: 13.2 G/DL — SIGNIFICANT CHANGE UP (ref 11.5–15.5)
IMM GRANULOCYTES NFR BLD AUTO: 1 % — SIGNIFICANT CHANGE UP (ref 0–1.5)
LIPID PNL WITH DIRECT LDL SERPL: 212 MG/DL — HIGH
LYMPHOCYTES # BLD AUTO: 2.5 K/UL — SIGNIFICANT CHANGE UP (ref 1–3.3)
LYMPHOCYTES # BLD AUTO: 36.5 % — SIGNIFICANT CHANGE UP (ref 13–44)
MCHC RBC-ENTMCNC: 29.3 PG — SIGNIFICANT CHANGE UP (ref 27–34)
MCHC RBC-ENTMCNC: 32.4 GM/DL — SIGNIFICANT CHANGE UP (ref 32–36)
MCV RBC AUTO: 90.5 FL — SIGNIFICANT CHANGE UP (ref 80–100)
MONOCYTES # BLD AUTO: 0.47 K/UL — SIGNIFICANT CHANGE UP (ref 0–0.9)
MONOCYTES NFR BLD AUTO: 6.9 % — SIGNIFICANT CHANGE UP (ref 2–14)
NEUTROPHILS # BLD AUTO: 3.72 K/UL — SIGNIFICANT CHANGE UP (ref 1.8–7.4)
NEUTROPHILS NFR BLD AUTO: 54.4 % — SIGNIFICANT CHANGE UP (ref 43–77)
NON HDL CHOLESTEROL: 238 MG/DL — HIGH
PLATELET # BLD AUTO: 362 K/UL — SIGNIFICANT CHANGE UP (ref 150–400)
POTASSIUM SERPL-MCNC: 4.1 MMOL/L — SIGNIFICANT CHANGE UP (ref 3.5–5.3)
POTASSIUM SERPL-SCNC: 4.1 MMOL/L — SIGNIFICANT CHANGE UP (ref 3.5–5.3)
PROT SERPL-MCNC: 6.9 G/DL — SIGNIFICANT CHANGE UP (ref 6–8.3)
RBC # BLD: 4.51 M/UL — SIGNIFICANT CHANGE UP (ref 3.8–5.2)
RBC # FLD: 12.2 % — SIGNIFICANT CHANGE UP (ref 10.3–14.5)
SODIUM SERPL-SCNC: 137 MMOL/L — SIGNIFICANT CHANGE UP (ref 135–145)
T4 FREE+ TSH PNL SERPL: 0.99 UIU/ML — SIGNIFICANT CHANGE UP (ref 0.27–4.2)
TRIGL SERPL-MCNC: 131 MG/DL — SIGNIFICANT CHANGE UP
VIT B12 SERPL-MCNC: 1174 PG/ML — SIGNIFICANT CHANGE UP (ref 232–1245)
WBC # BLD: 6.84 K/UL — SIGNIFICANT CHANGE UP (ref 3.8–10.5)
WBC # FLD AUTO: 6.84 K/UL — SIGNIFICANT CHANGE UP (ref 3.8–10.5)

## 2020-12-10 PROCEDURE — G0463: CPT

## 2020-12-10 PROCEDURE — 82043 UR ALBUMIN QUANTITATIVE: CPT

## 2020-12-10 PROCEDURE — 83036 HEMOGLOBIN GLYCOSYLATED A1C: CPT

## 2020-12-10 PROCEDURE — 82570 ASSAY OF URINE CREATININE: CPT

## 2020-12-10 PROCEDURE — 82306 VITAMIN D 25 HYDROXY: CPT

## 2020-12-10 PROCEDURE — 85025 COMPLETE CBC W/AUTO DIFF WBC: CPT

## 2020-12-10 PROCEDURE — 80053 COMPREHEN METABOLIC PANEL: CPT

## 2020-12-10 PROCEDURE — 36415 COLL VENOUS BLD VENIPUNCTURE: CPT

## 2020-12-10 PROCEDURE — 82607 VITAMIN B-12: CPT

## 2020-12-10 PROCEDURE — 82962 GLUCOSE BLOOD TEST: CPT

## 2020-12-10 PROCEDURE — 99213 OFFICE O/P EST LOW 20 MIN: CPT | Mod: GC

## 2020-12-10 PROCEDURE — 84443 ASSAY THYROID STIM HORMONE: CPT

## 2020-12-10 PROCEDURE — 80061 LIPID PANEL: CPT

## 2020-12-10 RX ORDER — INSULIN GLARGINE 100 [IU]/ML
100 INJECTION, SOLUTION SUBCUTANEOUS
Qty: 1 | Refills: 4 | Status: ACTIVE | COMMUNITY
Start: 2020-12-10 | End: 1900-01-01

## 2020-12-10 RX ORDER — INSULIN LISPRO 100 [IU]/ML
100 INJECTION, SOLUTION INTRAVENOUS; SUBCUTANEOUS
Qty: 1 | Refills: 4 | Status: ACTIVE | COMMUNITY
Start: 2020-12-10 | End: 1900-01-01

## 2020-12-10 RX ORDER — PEN NEEDLE, DIABETIC 29 G X1/2"
32G X 4 MM NEEDLE, DISPOSABLE MISCELLANEOUS
Qty: 100 | Refills: 3 | Status: ACTIVE | COMMUNITY
Start: 2020-12-10 | End: 1900-01-01

## 2020-12-11 LAB
CREAT ?TM UR-MCNC: 79 MG/DL — SIGNIFICANT CHANGE UP
CREAT ?TM UR-MCNC: 82 MG/DL — SIGNIFICANT CHANGE UP
MICROALBUMIN UR-MCNC: 2 MG/DL — SIGNIFICANT CHANGE UP
MICROALBUMIN/CREAT UR-RTO: 25 MG/G — SIGNIFICANT CHANGE UP (ref 0–30)

## 2020-12-14 ENCOUNTER — NON-APPOINTMENT (OUTPATIENT)
Age: 30
End: 2020-12-14

## 2020-12-14 ENCOUNTER — APPOINTMENT (OUTPATIENT)
Dept: OPHTHALMOLOGY | Facility: CLINIC | Age: 30
End: 2020-12-14
Payer: MEDICAID

## 2020-12-14 DIAGNOSIS — E25.0 CONGENITAL ADRENOGENITAL DISORDERS ASSOCIATED WITH ENZYME DEFICIENCY: ICD-10-CM

## 2020-12-14 DIAGNOSIS — E78.00 PURE HYPERCHOLESTEROLEMIA, UNSPECIFIED: ICD-10-CM

## 2020-12-14 PROCEDURE — 99072 ADDL SUPL MATRL&STAF TM PHE: CPT

## 2020-12-14 PROCEDURE — 92004 COMPRE OPH EXAM NEW PT 1/>: CPT

## 2020-12-14 PROCEDURE — 92134 CPTRZ OPH DX IMG PST SGM RTA: CPT

## 2020-12-21 PROBLEM — Z86.19 HISTORY OF CANDIDIASIS OF VAGINA: Status: RESOLVED | Noted: 2019-12-05 | Resolved: 2020-12-21

## 2020-12-23 PROBLEM — Z86.19 HISTORY OF CANDIDIASIS OF VAGINA: Status: RESOLVED | Noted: 2020-09-03 | Resolved: 2020-12-23

## 2021-02-03 ENCOUNTER — NON-APPOINTMENT (OUTPATIENT)
Age: 31
End: 2021-02-03

## 2021-02-12 ENCOUNTER — NON-APPOINTMENT (OUTPATIENT)
Age: 31
End: 2021-02-12

## 2021-03-11 ENCOUNTER — APPOINTMENT (OUTPATIENT)
Dept: OPHTHALMOLOGY | Facility: CLINIC | Age: 31
End: 2021-03-11
Payer: MEDICAID

## 2021-03-11 ENCOUNTER — NON-APPOINTMENT (OUTPATIENT)
Age: 31
End: 2021-03-11

## 2021-03-11 PROCEDURE — 92083 EXTENDED VISUAL FIELD XM: CPT

## 2021-03-11 PROCEDURE — 92012 INTRM OPH EXAM EST PATIENT: CPT

## 2021-03-11 PROCEDURE — 76514 ECHO EXAM OF EYE THICKNESS: CPT

## 2021-03-11 PROCEDURE — 99072 ADDL SUPL MATRL&STAF TM PHE: CPT

## 2021-03-11 PROCEDURE — 92133 CPTRZD OPH DX IMG PST SGM ON: CPT

## 2021-03-25 ENCOUNTER — NON-APPOINTMENT (OUTPATIENT)
Age: 31
End: 2021-03-25

## 2021-06-03 ENCOUNTER — APPOINTMENT (OUTPATIENT)
Dept: ENDOCRINOLOGY | Facility: HOSPITAL | Age: 31
End: 2021-06-03

## 2021-11-26 ENCOUNTER — TRANSCRIPTION ENCOUNTER (OUTPATIENT)
Age: 31
End: 2021-11-26

## 2023-03-14 ENCOUNTER — NON-APPOINTMENT (OUTPATIENT)
Age: 33
End: 2023-03-14

## 2023-08-23 ENCOUNTER — NON-APPOINTMENT (OUTPATIENT)
Age: 33
End: 2023-08-23

## 2023-09-21 ENCOUNTER — APPOINTMENT (OUTPATIENT)
Dept: OPHTHALMOLOGY | Facility: CLINIC | Age: 33
End: 2023-09-21
Payer: MEDICAID

## 2023-09-21 ENCOUNTER — NON-APPOINTMENT (OUTPATIENT)
Age: 33
End: 2023-09-21

## 2023-09-21 PROCEDURE — 92083 EXTENDED VISUAL FIELD XM: CPT

## 2023-09-21 PROCEDURE — 92133 CPTRZD OPH DX IMG PST SGM ON: CPT

## 2023-09-21 PROCEDURE — 92014 COMPRE OPH EXAM EST PT 1/>: CPT | Mod: 25

## 2023-11-19 ENCOUNTER — NON-APPOINTMENT (OUTPATIENT)
Age: 33
End: 2023-11-19

## 2024-03-04 ENCOUNTER — NON-APPOINTMENT (OUTPATIENT)
Age: 34
End: 2024-03-04

## 2024-03-19 NOTE — ED ADULT NURSE NOTE - BREATHING, MLM
Telephone patient to schedule enteroscopy  with no answer. Direct contact left to call back to schedule.    Spontaneous, unlabored and symmetrical

## 2024-03-21 ENCOUNTER — APPOINTMENT (OUTPATIENT)
Dept: OPHTHALMOLOGY | Facility: CLINIC | Age: 34
End: 2024-03-21

## 2024-09-18 NOTE — ED PROVIDER NOTE - CPE EDP RESP NORM
"Dixon is here with his wife for Body Stats. Seen by nurse practitioner ~5 wks ago. Current wt: 288lbs. 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 After the age of 40 started to experience weight gain. Used to run and over the last few years has reduced running. Guilty of under fueling during the day and over consuming later in the day. Upon review of dietary intake, Dixon is consuming a lack of nutrient dense foods earlier in the day and it is recommended to support his body and weight loss goals with more protein in the earlier parts of the day. As we age, we lost up to 10% of our lean mass each decade after age 40. It is important for men to preserve muscle tissue and consume adequate amounts.     Completed a body composition using SECA scale and reviewed results with patient.     Reevue Indirect Calorimeter REE: 2592 Weight loss without exercise: 2074  Weight loss with exercise: 0933-5236 Maintenance:  5401-4818        Reevue indirect calorimter revealed REE is +7% compared to the predictive normal for someone her same age, height, and gender.       Reviewed calorie plan of 9248-4717 calories per day based on data. Dixon will aim for 100g minimum per day of protein, along with spacing out meals and ADDING snacks. Having things on hand like higher protein options (Premier Protein shake, Think IQ or Quest Bars, jerky, Greek Yogurt, hard boiled eggs, dried edamame, etc.) to keep him satisfied during the day and also let apt to snack later in the evening. He will follow up in 4-6 weeks after appt with provider in Oct.     Patient seen by Medical Provider in past 6 months:  yes  Requested to schedule appointment with Medical Provider: No      Anthropometric Measurements  Start Weight (#): 288.8 lbs 8/9/24 [place]  Current Weight (#): 288  TBW % Change from start weight:-  Ideal Body Weight (#):70\"166 lbs  Goal Weight (#):200 lbs  Highest: Current  Lowest: 210#     Weight Loss History  Previous " weight loss attempts: Commercial Programs (Weight Watchers, Event[employer]oud, etc.)  Meal Replacements (Medifast, Slim Fast, etc.)    Food and Nutrition Related History  Wake up: 6     Bed Time:1030PM     Food Recall  Breakfast:Yogurt with Granola (Greek yogurt; Mandaen Simply granola 2/3 cup); coffee (black); water 20oz                 Snack:None  Lunch: 2PM-bishnu Peanut Butter and Jelly; Wheat or oatmeal with 3TBSP peanut, jelly appropriate; grapes on the side or an apple; water   Snack:None  Dinner:Chicken, pork, spinach pasta with chicken, 5-6oz protein, 1 cup (fresh or steamed); 1 cup; will have one helping; water   Snack:seldom something salty       Beverages: water and coffee/tea; alcohol on weekends (Saturday and Sunday; just a beverage)   Volume of beverage intake: 64+    Weekends: Same; but will skip lunch due to busy and life happens   Cravings: None  Trouble area of day:None     Frequency of Eating out: irregularly  Food restrictions:none  Cooking: self   Food Shopping: self; [employer], [employer], [employer]     Physical Activity Intake  Activity:Walking, Biking, and Strength Training, rower (circuit)   Frequency:several times per week  Physical limitations/barriers to exercise: None    Estimated Needs  Energy  SECA: BMR:2376      X 1.3 -1000 = 2089  [facility] Energy Needs: BMR : 2178   1-2# loss weekly sedentary:  4436-9081          1-2# loss weekly lightly active:1994-2494  Maintenance calories for sedentary activity level: 2613  Protein: gm      (1.2-1.5g/kg IBW)  Fluid: 88 oz     (35mL/kg IBW)    Nutrition Diagnosis  Yes;    Excessive energy intake  related to Food and nutrition related knowledge deficit concerning energy intake as evidenced by  BMI >25 for adults       Nutrition Intervention    Nutrition Prescription  Calories:2958-9980  Protein:100g/day  Fluid:88    Meal Plan (Vince/Pro/Carb)  Breakfast: 500 vince/30g pro  Snack:-  Lunch: 500-600 vince/30g pro  Snack: 200/10-15g pro  Dinner: 600-800/40g  PRO  Snack: 200/10-15g pro    Nutrition Education:    Healthy Core Manual  Calorie controlled menu  Lean protein food choices  Healthy snack options  Food journaling tips      Nutrition Counseling:  Strategies: meal planning, portion sizes, healthy snack choices, hydration, fiber intake, protein intake, exercise, food journal      Monitoring and Evaluation:  Evaluation criteria:  Energy Intake  Meet protein needs  Maintain adequate hydration  Monitor weekly weight  Meal planning/preparation  Food journal   Decreased portions at mealtimes and snacks  Physical activity     Barriers to learning:none  Readiness to change: Preparation:  (Getting ready to change)   Comprehension: very good  Expected Compliance: very good              normal...

## 2024-10-23 ENCOUNTER — NON-APPOINTMENT (OUTPATIENT)
Age: 34
End: 2024-10-23

## 2024-10-25 ENCOUNTER — NON-APPOINTMENT (OUTPATIENT)
Age: 34
End: 2024-10-25

## 2024-10-26 ENCOUNTER — OFFICE (OUTPATIENT)
Facility: LOCATION | Age: 34
Setting detail: OPHTHALMOLOGY
End: 2024-10-26
Payer: COMMERCIAL

## 2024-10-26 ENCOUNTER — RX ONLY (RX ONLY)
Age: 34
End: 2024-10-26

## 2024-10-26 DIAGNOSIS — H25.12: ICD-10-CM

## 2024-10-26 DIAGNOSIS — H20.00: ICD-10-CM

## 2024-10-26 PROCEDURE — 92285 EXTERNAL OCULAR PHOTOGRAPHY: CPT | Performed by: OPHTHALMOLOGY

## 2024-10-26 PROCEDURE — 99203 OFFICE O/P NEW LOW 30 MIN: CPT | Performed by: OPHTHALMOLOGY

## 2024-10-26 ASSESSMENT — KERATOMETRY
OD_K1POWER_DIOPTERS: 40.75
OS_K2POWER_DIOPTERS: 43.50
OS_AXISANGLE_DEGREES: 093
OD_K2POWER_DIOPTERS: 42.75
METHOD_AUTO_MANUAL: AUTO
OS_K1POWER_DIOPTERS: 40.75
OD_AXISANGLE_DEGREES: 084

## 2024-10-26 ASSESSMENT — REFRACTION_AUTOREFRACTION
OD_CYLINDER: -1.50
OD_SPHERE: -5.50
OS_CYLINDER: -1.50
OS_SPHERE: -5.25
OD_AXIS: 171
OS_AXIS: 007

## 2024-10-26 ASSESSMENT — VISUAL ACUITY
OD_BCVA: 20/20
OS_BCVA: 20/20-1

## 2024-10-26 ASSESSMENT — REFRACTION_CURRENTRX
OS_OVR_VA: 20/
OS_AXIS: 003
OD_AXIS: 170
OS_ADD: +0.50
OD_ADD: +0.50
OD_SPHERE: -5.00
OS_SPHERE: -5.25
OD_CYLINDER: -1.50
OD_OVR_VA: 20/
OS_CYLINDER: -1.00

## 2024-10-26 ASSESSMENT — CONFRONTATIONAL VISUAL FIELD TEST (CVF)
OD_FINDINGS: FULL
OS_FINDINGS: FULL

## 2024-10-28 ENCOUNTER — OFFICE (OUTPATIENT)
Facility: LOCATION | Age: 34
Setting detail: OPHTHALMOLOGY
End: 2024-10-28
Payer: COMMERCIAL

## 2024-10-28 ENCOUNTER — RX ONLY (RX ONLY)
Age: 34
End: 2024-10-28

## 2024-10-28 DIAGNOSIS — H25.13: ICD-10-CM

## 2024-10-28 DIAGNOSIS — H20.00: ICD-10-CM

## 2024-10-28 PROCEDURE — 92012 INTRM OPH EXAM EST PATIENT: CPT | Performed by: OPHTHALMOLOGY

## 2024-10-28 ASSESSMENT — REFRACTION_CURRENTRX
OD_ADD: +0.50
OS_SPHERE: -5.25
OD_OVR_VA: 20/
OS_ADD: +0.50
OD_AXIS: 170
OS_OVR_VA: 20/
OS_AXIS: 003
OD_SPHERE: -5.00
OS_CYLINDER: -1.00
OD_CYLINDER: -1.50

## 2024-10-28 ASSESSMENT — REFRACTION_AUTOREFRACTION
OS_AXIS: 007
OD_SPHERE: -5.50
OD_AXIS: 171
OS_SPHERE: -5.25
OS_CYLINDER: -1.50
OD_CYLINDER: -1.50

## 2024-10-28 ASSESSMENT — KERATOMETRY
OD_K1POWER_DIOPTERS: 40.75
METHOD_AUTO_MANUAL: AUTO
OS_AXISANGLE_DEGREES: 093
OS_K1POWER_DIOPTERS: 40.75
OS_K2POWER_DIOPTERS: 43.50
OD_AXISANGLE_DEGREES: 084
OD_K2POWER_DIOPTERS: 42.75

## 2024-10-28 ASSESSMENT — CONFRONTATIONAL VISUAL FIELD TEST (CVF)
OD_FINDINGS: FULL
OS_FINDINGS: FULL

## 2024-10-28 ASSESSMENT — VISUAL ACUITY
OD_BCVA: 20/20-1
OS_BCVA: 20/20

## 2024-11-02 ENCOUNTER — OFFICE (OUTPATIENT)
Facility: LOCATION | Age: 34
Setting detail: OPHTHALMOLOGY
End: 2024-11-02
Payer: COMMERCIAL

## 2024-11-02 DIAGNOSIS — H40.013: ICD-10-CM

## 2024-11-02 DIAGNOSIS — H20.00: ICD-10-CM

## 2024-11-02 PROBLEM — H25.13 CATARACT SENILE NUCLEAR SCLEROSIS; BOTH EYES: Status: ACTIVE | Noted: 2024-10-28

## 2024-11-02 PROCEDURE — 92133 CPTRZD OPH DX IMG PST SGM ON: CPT | Performed by: OPHTHALMOLOGY

## 2024-11-02 PROCEDURE — 76514 ECHO EXAM OF EYE THICKNESS: CPT | Performed by: OPHTHALMOLOGY

## 2024-11-02 PROCEDURE — 92012 INTRM OPH EXAM EST PATIENT: CPT | Performed by: OPHTHALMOLOGY

## 2024-11-02 ASSESSMENT — REFRACTION_CURRENTRX
OD_CYLINDER: -1.50
OS_CYLINDER: -1.00
OD_SPHERE: -5.00
OS_SPHERE: -5.25
OD_AXIS: 170
OS_ADD: +0.50
OD_ADD: +0.50
OS_AXIS: 003
OD_OVR_VA: 20/
OS_OVR_VA: 20/

## 2024-11-02 ASSESSMENT — KERATOMETRY
OD_K2POWER_DIOPTERS: 42.75
OD_K1POWER_DIOPTERS: 40.75
OS_K2POWER_DIOPTERS: 43.00
OD_AXISANGLE_DEGREES: 082
OS_AXISANGLE_DEGREES: 093
METHOD_AUTO_MANUAL: AUTO
OS_K1POWER_DIOPTERS: 40.75

## 2024-11-02 ASSESSMENT — REFRACTION_AUTOREFRACTION
OS_SPHERE: -5.25
OS_AXIS: 005
OD_SPHERE: -5.00
OS_CYLINDER: -1.50
OD_AXIS: 168
OD_CYLINDER: -1.75

## 2024-11-02 ASSESSMENT — PACHYMETRY
OD_CT_UM: 609
OS_CT_CORRECTION: -4
OD_CT_CORRECTION: -4
OS_CT_UM: 591

## 2024-11-02 ASSESSMENT — CONFRONTATIONAL VISUAL FIELD TEST (CVF)
OS_FINDINGS: FULL
OD_FINDINGS: FULL

## 2024-11-02 ASSESSMENT — TONOMETRY: OD_IOP_MMHG: 12

## 2024-11-02 ASSESSMENT — VISUAL ACUITY
OD_BCVA: 20/20-1
OS_BCVA: 20/20-1

## 2024-12-16 ENCOUNTER — NON-APPOINTMENT (OUTPATIENT)
Age: 34
End: 2024-12-16

## 2024-12-18 ENCOUNTER — INPATIENT (INPATIENT)
Facility: HOSPITAL | Age: 34
LOS: 3 days | Discharge: HOME | DRG: 193 | End: 2024-12-22
Attending: STUDENT IN AN ORGANIZED HEALTH CARE EDUCATION/TRAINING PROGRAM | Admitting: STUDENT IN AN ORGANIZED HEALTH CARE EDUCATION/TRAINING PROGRAM
Payer: MEDICAID

## 2024-12-18 VITALS
RESPIRATION RATE: 20 BRPM | HEIGHT: 63 IN | SYSTOLIC BLOOD PRESSURE: 124 MMHG | DIASTOLIC BLOOD PRESSURE: 79 MMHG | HEART RATE: 104 BPM | OXYGEN SATURATION: 100 % | WEIGHT: 139.99 LBS

## 2024-12-18 DIAGNOSIS — E11.10 TYPE 2 DIABETES MELLITUS WITH KETOACIDOSIS WITHOUT COMA: ICD-10-CM

## 2024-12-18 LAB
ADD ON TEST-SPECIMEN IN LAB: SIGNIFICANT CHANGE UP
ADD ON TEST-SPECIMEN IN LAB: SIGNIFICANT CHANGE UP
ALBUMIN SERPL ELPH-MCNC: 3 G/DL — LOW (ref 3.3–5)
ALBUMIN SERPL ELPH-MCNC: 3.6 G/DL — SIGNIFICANT CHANGE UP (ref 3.3–5)
ALP SERPL-CCNC: 117 U/L — SIGNIFICANT CHANGE UP (ref 40–120)
ALP SERPL-CCNC: 151 U/L — HIGH (ref 40–120)
ALT FLD-CCNC: 10 U/L — SIGNIFICANT CHANGE UP (ref 10–45)
ALT FLD-CCNC: 11 U/L — SIGNIFICANT CHANGE UP (ref 10–45)
ANION GAP SERPL CALC-SCNC: 24 MMOL/L — HIGH (ref 5–17)
ANION GAP SERPL CALC-SCNC: 27 MMOL/L — HIGH (ref 5–17)
APPEARANCE UR: CLEAR — SIGNIFICANT CHANGE UP
APTT BLD: 24.7 SEC — SIGNIFICANT CHANGE UP (ref 24.5–35.6)
APTT BLD: 30.1 SEC — SIGNIFICANT CHANGE UP (ref 24.5–35.6)
AST SERPL-CCNC: 21 U/L — SIGNIFICANT CHANGE UP (ref 10–40)
AST SERPL-CCNC: 23 U/L — SIGNIFICANT CHANGE UP (ref 10–40)
B-OH-BUTYR SERPL-SCNC: >8 MMOL/L — HIGH
BACTERIA # UR AUTO: NEGATIVE /HPF — SIGNIFICANT CHANGE UP
BASOPHILS # BLD AUTO: 0 K/UL — SIGNIFICANT CHANGE UP (ref 0–0.2)
BASOPHILS NFR BLD AUTO: 0 % — SIGNIFICANT CHANGE UP (ref 0–2)
BILIRUB SERPL-MCNC: 0.2 MG/DL — SIGNIFICANT CHANGE UP (ref 0.2–1.2)
BILIRUB SERPL-MCNC: 0.2 MG/DL — SIGNIFICANT CHANGE UP (ref 0.2–1.2)
BILIRUB UR-MCNC: NEGATIVE — SIGNIFICANT CHANGE UP
BUN SERPL-MCNC: 14 MG/DL — SIGNIFICANT CHANGE UP (ref 7–23)
BUN SERPL-MCNC: 18 MG/DL — SIGNIFICANT CHANGE UP (ref 7–23)
BURR CELLS BLD QL SMEAR: PRESENT — SIGNIFICANT CHANGE UP
CALCIUM SERPL-MCNC: 7.8 MG/DL — LOW (ref 8.4–10.5)
CALCIUM SERPL-MCNC: 8.2 MG/DL — LOW (ref 8.4–10.5)
CAST: 5 /LPF — HIGH (ref 0–4)
CHLORIDE SERPL-SCNC: 100 MMOL/L — SIGNIFICANT CHANGE UP (ref 96–108)
CHLORIDE SERPL-SCNC: 97 MMOL/L — SIGNIFICANT CHANGE UP (ref 96–108)
CO2 SERPL-SCNC: <10 MMOL/L — CRITICAL LOW (ref 22–31)
CO2 SERPL-SCNC: <10 MMOL/L — CRITICAL LOW (ref 22–31)
COLOR SPEC: YELLOW — SIGNIFICANT CHANGE UP
CREAT SERPL-MCNC: 0.49 MG/DL — LOW (ref 0.5–1.3)
CREAT SERPL-MCNC: 0.55 MG/DL — SIGNIFICANT CHANGE UP (ref 0.5–1.3)
DIFF PNL FLD: ABNORMAL
EGFR: 123 ML/MIN/1.73M2 — SIGNIFICANT CHANGE UP
EGFR: 127 ML/MIN/1.73M2 — SIGNIFICANT CHANGE UP
EOSINOPHIL # BLD AUTO: 0 K/UL — SIGNIFICANT CHANGE UP (ref 0–0.5)
EOSINOPHIL NFR BLD AUTO: 0 % — SIGNIFICANT CHANGE UP (ref 0–6)
FLUAV AG NPH QL: DETECTED
FLUBV AG NPH QL: SIGNIFICANT CHANGE UP
GAS PNL BLDV: SIGNIFICANT CHANGE UP
GLUCOSE BLDC GLUCOMTR-MCNC: 261 MG/DL — HIGH (ref 70–99)
GLUCOSE BLDC GLUCOMTR-MCNC: 289 MG/DL — HIGH (ref 70–99)
GLUCOSE BLDC GLUCOMTR-MCNC: 355 MG/DL — HIGH (ref 70–99)
GLUCOSE BLDC GLUCOMTR-MCNC: 363 MG/DL — HIGH (ref 70–99)
GLUCOSE SERPL-MCNC: 256 MG/DL — HIGH (ref 70–99)
GLUCOSE SERPL-MCNC: 415 MG/DL — HIGH (ref 70–99)
GLUCOSE UR QL: >=1000 MG/DL
HCG SERPL-ACNC: <2 MIU/ML — SIGNIFICANT CHANGE UP
HCT VFR BLD CALC: 43.6 % — SIGNIFICANT CHANGE UP (ref 34.5–45)
HGB BLD-MCNC: 13.5 G/DL — SIGNIFICANT CHANGE UP (ref 11.5–15.5)
HYALINE CASTS # UR AUTO: PRESENT
INR BLD: 1.03 RATIO — SIGNIFICANT CHANGE UP (ref 0.85–1.16)
INR BLD: 1.04 RATIO — SIGNIFICANT CHANGE UP (ref 0.85–1.16)
KETONES UR-MCNC: >=160 MG/DL
LEUKOCYTE ESTERASE UR-ACNC: NEGATIVE — SIGNIFICANT CHANGE UP
LYMPHOCYTES # BLD AUTO: 1.47 K/UL — SIGNIFICANT CHANGE UP (ref 1–3.3)
LYMPHOCYTES # BLD AUTO: 6.1 % — LOW (ref 13–44)
MAGNESIUM SERPL-MCNC: 1.7 MG/DL — SIGNIFICANT CHANGE UP (ref 1.6–2.6)
MAGNESIUM SERPL-MCNC: 2.1 MG/DL — SIGNIFICANT CHANGE UP (ref 1.6–2.6)
MANUAL SMEAR VERIFICATION: SIGNIFICANT CHANGE UP
MCHC RBC-ENTMCNC: 28.5 PG — SIGNIFICANT CHANGE UP (ref 27–34)
MCHC RBC-ENTMCNC: 31 G/DL — LOW (ref 32–36)
MCV RBC AUTO: 92 FL — SIGNIFICANT CHANGE UP (ref 80–100)
METAMYELOCYTES # FLD: 0.9 % — HIGH (ref 0–0)
MONOCYTES # BLD AUTO: 1.67 K/UL — HIGH (ref 0–0.9)
MONOCYTES NFR BLD AUTO: 6.9 % — SIGNIFICANT CHANGE UP (ref 2–14)
NEUTROPHILS # BLD AUTO: 20.4 K/UL — HIGH (ref 1.8–7.4)
NEUTROPHILS NFR BLD AUTO: 80.9 % — HIGH (ref 43–77)
NEUTS BAND # BLD: 3.5 % — SIGNIFICANT CHANGE UP (ref 0–8)
NITRITE UR-MCNC: NEGATIVE — SIGNIFICANT CHANGE UP
OVALOCYTES BLD QL SMEAR: SLIGHT — SIGNIFICANT CHANGE UP
PH UR: 5 — SIGNIFICANT CHANGE UP (ref 5–8)
PHOSPHATE SERPL-MCNC: 0.9 MG/DL — CRITICAL LOW (ref 2.5–4.5)
PLAT MORPH BLD: NORMAL — SIGNIFICANT CHANGE UP
PLATELET # BLD AUTO: 345 K/UL — SIGNIFICANT CHANGE UP (ref 150–400)
POIKILOCYTOSIS BLD QL AUTO: SIGNIFICANT CHANGE UP
POLYCHROMASIA BLD QL SMEAR: SLIGHT — SIGNIFICANT CHANGE UP
POTASSIUM SERPL-MCNC: 3.6 MMOL/L — SIGNIFICANT CHANGE UP (ref 3.5–5.3)
POTASSIUM SERPL-MCNC: 4.7 MMOL/L — SIGNIFICANT CHANGE UP (ref 3.5–5.3)
POTASSIUM SERPL-SCNC: 3.6 MMOL/L — SIGNIFICANT CHANGE UP (ref 3.5–5.3)
POTASSIUM SERPL-SCNC: 4.7 MMOL/L — SIGNIFICANT CHANGE UP (ref 3.5–5.3)
PROT SERPL-MCNC: 6.6 G/DL — SIGNIFICANT CHANGE UP (ref 6–8.3)
PROT SERPL-MCNC: 7.7 G/DL — SIGNIFICANT CHANGE UP (ref 6–8.3)
PROT UR-MCNC: 100 MG/DL
PROTHROM AB SERPL-ACNC: 11.7 SEC — SIGNIFICANT CHANGE UP (ref 9.9–13.4)
PROTHROM AB SERPL-ACNC: 11.8 SEC — SIGNIFICANT CHANGE UP (ref 9.9–13.4)
RBC # BLD: 4.74 M/UL — SIGNIFICANT CHANGE UP (ref 3.8–5.2)
RBC # FLD: 13.1 % — SIGNIFICANT CHANGE UP (ref 10.3–14.5)
RBC BLD AUTO: ABNORMAL
RBC CASTS # UR COMP ASSIST: 0 /HPF — SIGNIFICANT CHANGE UP (ref 0–4)
REVIEW: SIGNIFICANT CHANGE UP
RSV RNA NPH QL NAA+NON-PROBE: SIGNIFICANT CHANGE UP
SARS-COV-2 RNA SPEC QL NAA+PROBE: SIGNIFICANT CHANGE UP
SODIUM SERPL-SCNC: 127 MMOL/L — LOW (ref 135–145)
SODIUM SERPL-SCNC: 128 MMOL/L — LOW (ref 135–145)
SP GR SPEC: 1.02 — SIGNIFICANT CHANGE UP (ref 1–1.03)
SQUAMOUS # UR AUTO: 0 /HPF — SIGNIFICANT CHANGE UP (ref 0–5)
UROBILINOGEN FLD QL: 0.2 MG/DL — SIGNIFICANT CHANGE UP (ref 0.2–1)
VARIANT LYMPHS # BLD: 1.7 % — SIGNIFICANT CHANGE UP (ref 0–6)
WBC # BLD: 24.17 K/UL — HIGH (ref 3.8–10.5)
WBC # FLD AUTO: 24.17 K/UL — HIGH (ref 3.8–10.5)
WBC UR QL: 0 /HPF — SIGNIFICANT CHANGE UP (ref 0–5)

## 2024-12-18 PROCEDURE — 71045 X-RAY EXAM CHEST 1 VIEW: CPT | Mod: 26

## 2024-12-18 PROCEDURE — 99291 CRITICAL CARE FIRST HOUR: CPT | Mod: GC

## 2024-12-18 PROCEDURE — 99291 CRITICAL CARE FIRST HOUR: CPT

## 2024-12-18 RX ORDER — WATER 1 ML/ML
1000 IRRIGANT IRRIGATION
Refills: 0 | Status: DISCONTINUED | OUTPATIENT
Start: 2024-12-18 | End: 2024-12-19

## 2024-12-18 RX ORDER — INSULIN HUMAN 100 [IU]/ML
5 INJECTION, SOLUTION SUBCUTANEOUS
Qty: 100 | Refills: 0 | Status: DISCONTINUED | OUTPATIENT
Start: 2024-12-18 | End: 2024-12-18

## 2024-12-18 RX ORDER — PIPERACILLIN AND TAZOBACTAM 3; .375 G/15ML; G/15ML
3.38 INJECTION, POWDER, LYOPHILIZED, FOR SOLUTION INTRAVENOUS ONCE
Refills: 0 | Status: COMPLETED | OUTPATIENT
Start: 2024-12-18 | End: 2024-12-18

## 2024-12-18 RX ORDER — SODIUM CHLORIDE 9 MG/ML
1000 INJECTION, SOLUTION INTRAVENOUS ONCE
Refills: 0 | Status: COMPLETED | OUTPATIENT
Start: 2024-12-18 | End: 2024-12-18

## 2024-12-18 RX ORDER — AZITHROMYCIN MONOHYDRATE 200 MG/5ML
500 POWDER, FOR SUSPENSION ORAL EVERY 24 HOURS
Refills: 0 | Status: DISCONTINUED | OUTPATIENT
Start: 2024-12-18 | End: 2024-12-20

## 2024-12-18 RX ORDER — INSULIN HUMAN 100 [IU]/ML
3 INJECTION, SOLUTION SUBCUTANEOUS
Qty: 100 | Refills: 0 | Status: DISCONTINUED | OUTPATIENT
Start: 2024-12-18 | End: 2024-12-19

## 2024-12-18 RX ORDER — PIPERACILLIN AND TAZOBACTAM 3; .375 G/15ML; G/15ML
3.38 INJECTION, POWDER, LYOPHILIZED, FOR SOLUTION INTRAVENOUS EVERY 8 HOURS
Refills: 0 | Status: DISCONTINUED | OUTPATIENT
Start: 2024-12-18 | End: 2024-12-22

## 2024-12-18 RX ORDER — HEPARIN SODIUM 1000 [USP'U]/ML
5000 INJECTION, SOLUTION INTRAVENOUS; SUBCUTANEOUS EVERY 8 HOURS
Refills: 0 | Status: DISCONTINUED | OUTPATIENT
Start: 2024-12-18 | End: 2024-12-22

## 2024-12-18 RX ORDER — SODIUM CHLORIDE 9 MG/ML
1000 INJECTION, SOLUTION INTRAVENOUS
Refills: 0 | Status: DISCONTINUED | OUTPATIENT
Start: 2024-12-18 | End: 2024-12-20

## 2024-12-18 RX ORDER — SODIUM CHLORIDE 9 MG/ML
1950 INJECTION, SOLUTION INTRAVENOUS ONCE
Refills: 0 | Status: COMPLETED | OUTPATIENT
Start: 2024-12-18 | End: 2024-12-18

## 2024-12-18 RX ORDER — SODIUM CHLORIDE 9 MG/ML
1000 INJECTION, SOLUTION INTRAVENOUS
Refills: 0 | Status: DISCONTINUED | OUTPATIENT
Start: 2024-12-18 | End: 2024-12-18

## 2024-12-18 RX ORDER — VANCOMYCIN HYDROCHLORIDE 5 G/100ML
1000 INJECTION, POWDER, LYOPHILIZED, FOR SOLUTION INTRAVENOUS ONCE
Refills: 0 | Status: DISCONTINUED | OUTPATIENT
Start: 2024-12-18 | End: 2024-12-18

## 2024-12-18 RX ORDER — VANCOMYCIN HYDROCHLORIDE 5 G/100ML
INJECTION, POWDER, LYOPHILIZED, FOR SOLUTION INTRAVENOUS
Refills: 0 | Status: DISCONTINUED | OUTPATIENT
Start: 2024-12-18 | End: 2024-12-18

## 2024-12-18 RX ORDER — VANCOMYCIN HYDROCHLORIDE 5 G/100ML
1000 INJECTION, POWDER, LYOPHILIZED, FOR SOLUTION INTRAVENOUS ONCE
Refills: 0 | Status: COMPLETED | OUTPATIENT
Start: 2024-12-18 | End: 2024-12-18

## 2024-12-18 RX ORDER — CHLORHEXIDINE GLUCONATE 1.2 MG/ML
1 RINSE ORAL
Refills: 0 | Status: DISCONTINUED | OUTPATIENT
Start: 2024-12-18 | End: 2024-12-20

## 2024-12-18 RX ADMIN — SODIUM CHLORIDE 1000 MILLILITER(S): 9 INJECTION, SOLUTION INTRAVENOUS at 19:26

## 2024-12-18 RX ADMIN — INSULIN HUMAN 5 UNIT(S)/HR: 100 INJECTION, SOLUTION SUBCUTANEOUS at 21:09

## 2024-12-18 RX ADMIN — SODIUM CHLORIDE 100 MILLILITER(S): 9 INJECTION, SOLUTION INTRAVENOUS at 19:47

## 2024-12-18 RX ADMIN — WATER 100 MILLILITER(S): 1 IRRIGANT IRRIGATION at 21:32

## 2024-12-18 RX ADMIN — PIPERACILLIN AND TAZOBACTAM 25 GRAM(S): 3; .375 INJECTION, POWDER, LYOPHILIZED, FOR SOLUTION INTRAVENOUS at 22:12

## 2024-12-18 RX ADMIN — PIPERACILLIN AND TAZOBACTAM 200 GRAM(S): 3; .375 INJECTION, POWDER, LYOPHILIZED, FOR SOLUTION INTRAVENOUS at 17:48

## 2024-12-18 RX ADMIN — VANCOMYCIN HYDROCHLORIDE 250 MILLIGRAM(S): 5 INJECTION, POWDER, LYOPHILIZED, FOR SOLUTION INTRAVENOUS at 18:35

## 2024-12-18 RX ADMIN — INSULIN HUMAN 5 UNIT(S)/HR: 100 INJECTION, SOLUTION SUBCUTANEOUS at 18:48

## 2024-12-18 RX ADMIN — SODIUM CHLORIDE 1950 MILLILITER(S): 9 INJECTION, SOLUTION INTRAVENOUS at 17:00

## 2024-12-18 RX ADMIN — HEPARIN SODIUM 5000 UNIT(S): 1000 INJECTION, SOLUTION INTRAVENOUS; SUBCUTANEOUS at 22:12

## 2024-12-18 RX ADMIN — SODIUM CHLORIDE 4000 MILLILITER(S): 9 INJECTION, SOLUTION INTRAVENOUS at 21:09

## 2024-12-18 NOTE — H&P ADULT - ATTENDING COMMENTS
Patient seen and examined.  Agree with resident note as above.  Patient with hx as noted including DM1 on 70/30 who presents to [facility] with 5 days of cough/fatigue/nausea and found to have +flu and labs c/w DKA.  Patient admitted to MICU in that setting.    ON arrival to MICU, patient is hyperpneic but otherwise stable.  Will continue bolus IVF resuscitation and then maintenance with Kcl.  Lyte repletion of K and Mg.  Insulin gtt per protocol, FSGq1h, pcodt3h.  Also with leukocytosis, fever, cough- will treat empirically for superimposed bacterial PNA.  SubQhep ppx.  FULL CODE.   is NOK.    Rest of plan and I have edited as appropriate.

## 2024-12-18 NOTE — ED PROVIDER NOTE - CLINICAL SUMMARY MEDICAL DECISION MAKING FREE TEXT BOX
This is a 34-year-old female with history of diabetes presenting for shortness of breath, altered mental status.  Vitals with tachypnea, hypothermia.  Exam with patient lethargic appearing, Kussmaul breathing.  Differential diagnosis includes DKA, possible infectious versus other trigger given her reported cough.  Sepsis is also a consideration, will give broad-spectrum antibiotics.  Will fluid hydrate, obtain broad workup.

## 2024-12-18 NOTE — PATIENT PROFILE ADULT - FALL HARM RISK - DEVICES
Minocycline Counseling: Patient advised regarding possible photosensitivity and discoloration of the teeth, skin, lips, tongue and gums.  Patient instructed to avoid sunlight, if possible.  When exposed to sunlight, patients should wear protective clothing, sunglasses, and sunscreen.  The patient was instructed to call the office immediately if the following severe adverse effects occur:  hearing changes, easy bruising/bleeding, severe headache, or vision changes.  The patient verbalized understanding of the proper use and possible adverse effects of minocycline.  All of the patient's questions and concerns were addressed. None

## 2024-12-18 NOTE — ED ADULT NURSE NOTE - OBJECTIVE STATEMENT
35 y/o female with PMH DM presenting to ED for SOB. Pt reports that she went to urgent care and was diagnosed with bronchitis, given an inhaler but did not do other testing. Pt states she's been feeling unwell x 4 days. +cough +runny nose. Pt is lethargic but arousable, Aox4, tachypneic, rectally 94F, MD Bautista at bedside. Placed on warming blanket.

## 2024-12-18 NOTE — H&P ADULT - NSHPLABSRESULTS_GEN_ALL_CORE
13.5   24. )-----------( 345      ( 18 Dec 2024 17:17 )             43.6     18    127[L]  |  97  |  18  ----------------------------<  415[H]  4.7   |  <10[LL]  |  0.55    Ca    8.2[L]      18 Dec 2024 17:17    TPro  7.7  /  Alb  3.6  /  TBili  0.2  /  DBili  x   /  AST  23  /  ALT  11  /  AlkPhos  151[H]  12        LIVER FUNCTIONS - ( 18 Dec 2024 17:17 )  Alb: 3.6 g/dL / Pro: 7.7 g/dL / ALK PHOS: 151 U/L / ALT: 11 U/L / AST: 23 U/L / GGT: x           PT/INR - ( 18 Dec 2024 17:17 )   PT: 11.8 sec;   INR: 1.04 ratio         PTT - ( 18 Dec 2024 17:17 )  PTT:30.1 sec    Urinalysis Basic - ( 18 Dec 2024 17:40 )    Color: Yellow / Appearance: Clear / S.021 / pH: x  Gluc: x / Ketone: >=160 mg/dL  / Bili: Negative / Urobili: 0.2 mg/dL   Blood: x / Protein: 100 mg/dL / Nitrite: Negative   Leuk Esterase: Negative / RBC: 0 /HPF / WBC 0 /HPF   Sq Epi: x / Non Sq Epi: 0 /HPF / Bacteria: Negative /HPF          Blood, Urine: Trace ( @ 17:40)    Troponin Trend:                     EKG:     RADIOLOGY STUDIES: 13.5   24. )-----------( 345      ( 18 Dec 2024 17:17 )             43.6     12-18    127[L]  |  97  |  18  ----------------------------<  415[H]  4.7   |  <10[LL]  |  0.55    Ca    8.2[L]      18 Dec 2024 17:17    TPro  7.7  /  Alb  3.6  /  TBili  0.2  /  DBili  x   /  AST  23  /  ALT  11  /  AlkPhos  151[H]  12-18        LIVER FUNCTIONS - ( 18 Dec 2024 17:17 )  Alb: 3.6 g/dL / Pro: 7.7 g/dL / ALK PHOS: 151 U/L / ALT: 11 U/L / AST: 23 U/L / GGT: x           PT/INR - ( 18 Dec 2024 17:17 )   PT: 11.8 sec;   INR: 1.04 ratio         PTT - ( 18 Dec 2024 17:17 )  PTT:30.1 sec    Urinalysis Basic - ( 18 Dec 2024 17:40 )    Color: Yellow / Appearance: Clear / S.021 / pH: x  Gluc: x / Ketone: >=160 mg/dL  / Bili: Negative / Urobili: 0.2 mg/dL   Blood: x / Protein: 100 mg/dL / Nitrite: Negative   Leuk Esterase: Negative / RBC: 0 /HPF / WBC 0 /HPF   Sq Epi: x / Non Sq Epi: 0 /HPF / Bacteria: Negative /HPF      RADIOLOGY STUDIES:  Personally reviewed.    < from: Xray Chest 1 View- PORTABLE-Urgent (Xray Chest 1 View- PORTABLE-Urgent .) (24 @ 18:07) >      INTERPRETATION:  Preliminary  impression: No acute findings in chest.    < end of copied text >

## 2024-12-18 NOTE — H&P ADULT - NSHPPHYSICALEXAM_GEN_ALL_CORE
VITALS:   T(C): 34 (12-18-24 @ 18:45), Max: 34.4 (12-18-24 @ 16:59)  HR: 97 (12-18-24 @ 18:45) (97 - 104)  BP: 115/72 (12-18-24 @ 18:45) (103/86 - 127/87)  RR: 26 (12-18-24 @ 18:45) (20 - 26)  SpO2: 100% (12-18-24 @ 18:45) (100% - 100%)    GENERAL: ill appearing, lethargic  HEAD:  Atraumatic, normocephalic  EYES: EOMI, PERRLA, conjunctiva and sclera clear  ENT: dry mucous membranes  NECK: Supple, no JVD  HEART: Regular rate and rhythm, no murmurs, rubs, or gallops  LUNGS: kussmaul respirations.  Clear to auscultation bilaterally, no crackles, wheezing, or rhonchi  ABDOMEN: Soft, nontender, nondistended, +BS  EXTREMITIES: 2+ peripheral pulses bilaterally. No clubbing, cyanosis, or edema  NERVOUS SYSTEM:  A&Ox3, no focal deficits but lethargic  SKIN: No rashes or lesions

## 2024-12-18 NOTE — H&P ADULT - NSHPREVIEWOFSYSTEMS_GEN_ALL_CORE
REVIEW OF SYSTEMS:  CONSTITUTIONAL: +weakness, fevers, chills, lethargy  EYES/ENT: No visual changes;  No vertigo or throat pain   NECK: No pain or stiffness  RESPIRATORY: +cough,  +shortness of breath  CARDIOVASCULAR: No chest pain or palpitations  GASTROINTESTINAL: No abdominal or epigastric pain. +nausea/wretching no vomiting.  No diarrhea or constipation. No melena or hematochezia.  GENITOURINARY: No dysuria, frequency or hematuria  NEUROLOGICAL: No numbness or weakness  SKIN: No itching, rashes

## 2024-12-18 NOTE — ED PROVIDER NOTE - PHYSICAL EXAMINATION
Physical Exam:  General: LEthargic appearing  Eyes: EOMI, Conjunctiva and sclera clear  Neck: No JVD  Lungs: Clear to auscultation bilaterally, no wheeze, no rhonchi. Tachypnea, kussmaul breathing  Heart: Normal S1, S2, no murmurs  Abdomen: Soft, nontender, nondistended, no CVA tenderness  Extremities: 2+ peripheral pulses, no edema  Neurologic: Non-focal

## 2024-12-18 NOTE — ED PROVIDER NOTE - ATTENDING CONTRIBUTION TO CARE
34-year-old female with history of diabetes presenting for shortness of breath.  Per family, last week she was having a cough and went to urgent care and was given albuterol.  Patient was seen by her mother today and chills very weak and was slow to respond and so she brought her to the ER due to her severe malaise.  Noted to be hypothermic put on a Victorino hugger complaining of viral-like symptoms.  Patient's  has the flu right now symptoms consistent with the flu but patient's quite obtunded but opens her eyes responds to commands first gas return very acidotic but unclear if was a poor draw rule out dka, 30cc/kg running.  sepsis workup ordered. 34-year-old female with history of diabetes presenting for shortness of breath.  Per family, last week she was having a cough and went to urgent care and was given albuterol.  Patient was seen by her mother today and chills very weak and was slow to respond and so she brought her to the ER due to her severe malaise.  Noted to be hypothermic put on a Victorino hugger complaining of viral-like symptoms.  Patient's  has the flu right now symptoms consistent with the flu but patient's quite obtunded but opens her eyes responds to commands first gas return very acidotic but unclear if was a poor draw rule out dka, 30cc/kg running, sepsis workup ordered.

## 2024-12-18 NOTE — PATIENT PROFILE ADULT - IS THERE A SUSPICION OF ABUSE/NEGLIGENCE?
Chief Complaint   Patient presents with   • Prenatal Care     28w0d     Denies known Latex allergy or symptoms of Latex sensitivity.  Medications verified, no changes.  Patient would like communication of their results via:        [facility]    Cell Phone:   Telephone Information:   Mobile [phone]     Okay to leave a message containing results? Yes  NO CHAPERONE NEEDED FOR THIS VISIT  No Pcp  Fartun is a 29 year old female here for an obstetrics check.  She is      .  Gestational age: 28w0d      She denies bleeding.  She denies cramping  She reports fetal movement     See Prenatal Flowsheet for additional comments.  Vaccine Information Statement(s) was given today. This has been reviewed, questions answered, and verbal consent given by Patient for injection(s) and administration of Tetanus/Diphtheria/Pertussis (Tdap).    Patient tolerated without incident. See immunization grid for documentation.     no

## 2024-12-18 NOTE — ED PROVIDER NOTE - OBJECTIVE STATEMENT
This is a 34-year-old female with history of diabetes presenting for shortness of breath.  Per family, last week she was having a cough and went to urgent care and was given albuterol.  Patient is reporting shortness of breath for an unclear amount of time, and mother went to see patient today and stated she did not look right so brought her to the ED.  Unclear how long she has been like this for.  Patient herself denies fevers, abdominal pain, vomiting, dysuria, any other complaints besides shortness of breath.  No chest pain.  No history of prior symptoms.  Reports taking insulin normally.  Reports type 1 diabetes.

## 2024-12-18 NOTE — H&P ADULT - ASSESSMENT
Pt is a 35 year old woman with DM1 who presents for 4 days of cough, headache, and lethargy found to have DKA, admitted to MICU for management.     PLAN:    NEURO  #No active issues  - AO3 though somewhat lethargic iso DKA    CV  #no active issues    RESPIRATORY  #irregular breathing pattern  - Kussmaul breathing iso severe acidosis  - able to protect airway and speak    RENAL  #HAGMA  - ph <6.90 x2; bicarb 10-->4 in setting of DKA  - expect improvement with fluid resuscitation and insulin gtt  - VBG q4hr   - consider bicarb gtt if no improvement     GI  #Diet  - NPO iso DKA/insulin gtt    ENDOCRINE  #DKA  #Type 1 Diabetes mellitus  - home regimen 70/30: 30u am 20u pm  - seemingly iso influenza infxn, unclear if adherent to insulin last few days while sick  - pH <6.90, BHB >8, glucosuria, ketonuria, initial serum glucose 379  - insulin gtt at 5u/hr  - s/p 2L in ED, continue fluid resuscitation with LR (or 1/2NS with added K)  - maintenance fluids with insulin gtt; BMP q4hr    ID  #influenza  - flu A positive iso  flu A positive  - outside window for tamiflu  - trend WBC, fu cultures, trend fever curve  - will cover empirically with zosyn + azithro for now  - fu urine legionella, strep    HEME  #Leukocytosis  - likely reactive iso DKA +/- flu infection  - abx as above     Pt is a 35 year old woman with DM1 who presents for 4 days of cough, headache, and lethargy found to have DKA, admitted to MICU for management.     PLAN:    NEURO  #No active issues  - AO3 though somewhat lethargic iso DKA    CV  #no active issues    RESPIRATORY  #irregular breathing pattern  - Kussmaul breathing iso severe acidosis  - able to protect airway and speak    RENAL  #HAGMA  - ph <6.90 x2; bicarb 10-->4 in setting of DKA  - expect improvement with fluid resuscitation and insulin gtt  - VBG q4hr   - consider bicarb gtt if no improvement     GI  #Diet  - NPO iso DKA/insulin gtt    ENDOCRINE  #DKA  #Type 1 Diabetes mellitus  - home regimen 70/30: 30u am 20u pm  - seemingly iso influenza infxn, unclear if adherent to insulin last few days while sick  - pH <6.90, BHB >8, glucosuria, ketonuria, initial serum glucose 379  - insulin gtt at 5u/hr  - s/p 2L in ED, continue fluid resuscitation with LR (or 1/2NS with added K)  - maintenance fluids with insulin gtt; BMP q4hr    ID  #influenza  - flu A positive iso  flu A positive  - outside window for tamiflu  - trend WBC, fu cultures, trend fever curve  - will cover empirically with zosyn + azithro for now  - fu urine legionella, strep    HEME  #Leukocytosis  - likely reactive iso DKA +/- flu infection  - abx as above; empiric abx as above, will stop if leukocytosis resolves and cx negative    PPX  #subQhep

## 2024-12-18 NOTE — ED PROVIDER NOTE - NSICDXPASTMEDICALHX_GEN_ALL_CORE_FT
PAST MEDICAL HISTORY:  Diabetes unclear type 1 vs type at time of diagnosis    DKA (diabetic ketoacidoses)

## 2024-12-18 NOTE — ED ADULT NURSE NOTE - NSFALLRISKINTERV_ED_ALL_ED

## 2024-12-18 NOTE — PATIENT PROFILE ADULT - FALL HARM RISK - HARM RISK INTERVENTIONS

## 2024-12-18 NOTE — H&P ADULT - HISTORY OF PRESENT ILLNESS
Pt is a 35 year old woman with DM1 who presents for 4 days of cough, headache, and lethargy. On Saturday, she developed a cough and HA. Sunday, she presented to urgent care and was diagnosed with bronchitis and given albuterol. Since then she developed worsening lethargy until today when her mother brought her to the ED. Of note, she lives with her  who has the flu. In 2010, she had DKA brought on by stress which presented similarly. She takes her insulin regularly (70/30 30u am, 20u pm) but does not follow with any physicians. Denies HA. Endorses SOB, excessive thirst, +n, -v, -d. Endorses feeling feverish over this period.    ED Course: , /86, RR 23, 100%RA, pH <6.9, bicarb 10-->4, UA+ for glucose, ketones.  Flu A positive. Given 2L fluids, started on insulin gtt. Admitted to MICU for DKA management.

## 2024-12-19 DIAGNOSIS — H66.90 OTITIS MEDIA, UNSPECIFIED, UNSPECIFIED EAR: ICD-10-CM

## 2024-12-19 DIAGNOSIS — E11.10 TYPE 2 DIABETES MELLITUS WITH KETOACIDOSIS WITHOUT COMA: ICD-10-CM

## 2024-12-19 DIAGNOSIS — E78.5 HYPERLIPIDEMIA, UNSPECIFIED: ICD-10-CM

## 2024-12-19 DIAGNOSIS — I10 ESSENTIAL (PRIMARY) HYPERTENSION: ICD-10-CM

## 2024-12-19 LAB
ADD ON TEST-SPECIMEN IN LAB: SIGNIFICANT CHANGE UP
ADD ON TEST-SPECIMEN IN LAB: SIGNIFICANT CHANGE UP
ALBUMIN SERPL ELPH-MCNC: 2.7 G/DL — LOW (ref 3.3–5)
ALBUMIN SERPL ELPH-MCNC: 2.7 G/DL — LOW (ref 3.3–5)
ALBUMIN SERPL ELPH-MCNC: 2.8 G/DL — LOW (ref 3.3–5)
ALBUMIN SERPL ELPH-MCNC: 2.8 G/DL — LOW (ref 3.3–5)
ALBUMIN SERPL ELPH-MCNC: 2.9 G/DL — LOW (ref 3.3–5)
ALP SERPL-CCNC: 102 U/L — SIGNIFICANT CHANGE UP (ref 40–120)
ALP SERPL-CCNC: 85 U/L — SIGNIFICANT CHANGE UP (ref 40–120)
ALP SERPL-CCNC: 91 U/L — SIGNIFICANT CHANGE UP (ref 40–120)
ALP SERPL-CCNC: 91 U/L — SIGNIFICANT CHANGE UP (ref 40–120)
ALP SERPL-CCNC: 98 U/L — SIGNIFICANT CHANGE UP (ref 40–120)
ALT FLD-CCNC: 6 U/L — LOW (ref 10–45)
ALT FLD-CCNC: 7 U/L — LOW (ref 10–45)
ALT FLD-CCNC: 7 U/L — LOW (ref 10–45)
ALT FLD-CCNC: 8 U/L — LOW (ref 10–45)
ALT FLD-CCNC: 9 U/L — LOW (ref 10–45)
ANION GAP SERPL CALC-SCNC: 12 MMOL/L — SIGNIFICANT CHANGE UP (ref 5–17)
ANION GAP SERPL CALC-SCNC: 17 MMOL/L — SIGNIFICANT CHANGE UP (ref 5–17)
ANION GAP SERPL CALC-SCNC: 18 MMOL/L — HIGH (ref 5–17)
ANION GAP SERPL CALC-SCNC: 21 MMOL/L — HIGH (ref 5–17)
ANION GAP SERPL CALC-SCNC: 22 MMOL/L — HIGH (ref 5–17)
AST SERPL-CCNC: 13 U/L — SIGNIFICANT CHANGE UP (ref 10–40)
AST SERPL-CCNC: 15 U/L — SIGNIFICANT CHANGE UP (ref 10–40)
AST SERPL-CCNC: 16 U/L — SIGNIFICANT CHANGE UP (ref 10–40)
AST SERPL-CCNC: 17 U/L — SIGNIFICANT CHANGE UP (ref 10–40)
AST SERPL-CCNC: 18 U/L — SIGNIFICANT CHANGE UP (ref 10–40)
B-OH-BUTYR SERPL-SCNC: 6.6 MMOL/L — HIGH
BASE EXCESS BLDV CALC-SCNC: -9.1 MMOL/L — LOW (ref -2–3)
BASOPHILS # BLD AUTO: 0.09 K/UL — SIGNIFICANT CHANGE UP (ref 0–0.2)
BASOPHILS NFR BLD AUTO: 0.3 % — SIGNIFICANT CHANGE UP (ref 0–2)
BILIRUB SERPL-MCNC: 0.1 MG/DL — LOW (ref 0.2–1.2)
BILIRUB SERPL-MCNC: 0.2 MG/DL — SIGNIFICANT CHANGE UP (ref 0.2–1.2)
BUN SERPL-MCNC: 11 MG/DL — SIGNIFICANT CHANGE UP (ref 7–23)
BUN SERPL-MCNC: 4 MG/DL — LOW (ref 7–23)
BUN SERPL-MCNC: 6 MG/DL — LOW (ref 7–23)
BUN SERPL-MCNC: 7 MG/DL — SIGNIFICANT CHANGE UP (ref 7–23)
BUN SERPL-MCNC: <4 MG/DL — LOW (ref 7–23)
CALCIUM SERPL-MCNC: 6.9 MG/DL — LOW (ref 8.4–10.5)
CALCIUM SERPL-MCNC: 7.1 MG/DL — LOW (ref 8.4–10.5)
CALCIUM SERPL-MCNC: 7.5 MG/DL — LOW (ref 8.4–10.5)
CHLORIDE SERPL-SCNC: 101 MMOL/L — SIGNIFICANT CHANGE UP (ref 96–108)
CHLORIDE SERPL-SCNC: 103 MMOL/L — SIGNIFICANT CHANGE UP (ref 96–108)
CHLORIDE SERPL-SCNC: 104 MMOL/L — SIGNIFICANT CHANGE UP (ref 96–108)
CHLORIDE SERPL-SCNC: 108 MMOL/L — SIGNIFICANT CHANGE UP (ref 96–108)
CHLORIDE SERPL-SCNC: 109 MMOL/L — HIGH (ref 96–108)
CHOLEST SERPL-MCNC: 186 MG/DL — SIGNIFICANT CHANGE UP
CO2 BLDV-SCNC: 17 MMOL/L — LOW (ref 22–26)
CO2 SERPL-SCNC: 11 MMOL/L — LOW (ref 22–31)
CO2 SERPL-SCNC: 12 MMOL/L — LOW (ref 22–31)
CO2 SERPL-SCNC: 15 MMOL/L — LOW (ref 22–31)
CO2 SERPL-SCNC: <10 MMOL/L — CRITICAL LOW (ref 22–31)
CO2 SERPL-SCNC: <10 MMOL/L — CRITICAL LOW (ref 22–31)
CREAT SERPL-MCNC: 0.32 MG/DL — LOW (ref 0.5–1.3)
CREAT SERPL-MCNC: 0.36 MG/DL — LOW (ref 0.5–1.3)
CREAT SERPL-MCNC: 0.37 MG/DL — LOW (ref 0.5–1.3)
CREAT SERPL-MCNC: 0.38 MG/DL — LOW (ref 0.5–1.3)
CREAT SERPL-MCNC: 0.39 MG/DL — LOW (ref 0.5–1.3)
CULTURE RESULTS: NO GROWTH — SIGNIFICANT CHANGE UP
EGFR: 134 ML/MIN/1.73M2 — SIGNIFICANT CHANGE UP
EGFR: 135 ML/MIN/1.73M2 — SIGNIFICANT CHANGE UP
EGFR: 136 ML/MIN/1.73M2 — SIGNIFICANT CHANGE UP
EGFR: 137 ML/MIN/1.73M2 — SIGNIFICANT CHANGE UP
EGFR: 140 ML/MIN/1.73M2 — SIGNIFICANT CHANGE UP
EOSINOPHIL # BLD AUTO: 0.01 K/UL — SIGNIFICANT CHANGE UP (ref 0–0.5)
EOSINOPHIL NFR BLD AUTO: 0 % — SIGNIFICANT CHANGE UP (ref 0–6)
FLUAV H3 RNA SPEC QL NAA+PROBE: DETECTED
GAS PNL BLDV: SIGNIFICANT CHANGE UP
GLUCOSE BLDC GLUCOMTR-MCNC: 166 MG/DL — HIGH (ref 70–99)
GLUCOSE BLDC GLUCOMTR-MCNC: 169 MG/DL — HIGH (ref 70–99)
GLUCOSE BLDC GLUCOMTR-MCNC: 172 MG/DL — HIGH (ref 70–99)
GLUCOSE BLDC GLUCOMTR-MCNC: 195 MG/DL — HIGH (ref 70–99)
GLUCOSE BLDC GLUCOMTR-MCNC: 201 MG/DL — HIGH (ref 70–99)
GLUCOSE BLDC GLUCOMTR-MCNC: 203 MG/DL — HIGH (ref 70–99)
GLUCOSE BLDC GLUCOMTR-MCNC: 204 MG/DL — HIGH (ref 70–99)
GLUCOSE BLDC GLUCOMTR-MCNC: 207 MG/DL — HIGH (ref 70–99)
GLUCOSE BLDC GLUCOMTR-MCNC: 209 MG/DL — HIGH (ref 70–99)
GLUCOSE BLDC GLUCOMTR-MCNC: 210 MG/DL — HIGH (ref 70–99)
GLUCOSE BLDC GLUCOMTR-MCNC: 213 MG/DL — HIGH (ref 70–99)
GLUCOSE BLDC GLUCOMTR-MCNC: 217 MG/DL — HIGH (ref 70–99)
GLUCOSE BLDC GLUCOMTR-MCNC: 225 MG/DL — HIGH (ref 70–99)
GLUCOSE BLDC GLUCOMTR-MCNC: 225 MG/DL — HIGH (ref 70–99)
GLUCOSE BLDC GLUCOMTR-MCNC: 226 MG/DL — HIGH (ref 70–99)
GLUCOSE BLDC GLUCOMTR-MCNC: 237 MG/DL — HIGH (ref 70–99)
GLUCOSE BLDC GLUCOMTR-MCNC: 249 MG/DL — HIGH (ref 70–99)
GLUCOSE BLDC GLUCOMTR-MCNC: 253 MG/DL — HIGH (ref 70–99)
GLUCOSE BLDC GLUCOMTR-MCNC: 270 MG/DL — HIGH (ref 70–99)
GLUCOSE SERPL-MCNC: 161 MG/DL — HIGH (ref 70–99)
GLUCOSE SERPL-MCNC: 208 MG/DL — HIGH (ref 70–99)
GLUCOSE SERPL-MCNC: 209 MG/DL — HIGH (ref 70–99)
GLUCOSE SERPL-MCNC: 212 MG/DL — HIGH (ref 70–99)
GLUCOSE SERPL-MCNC: 230 MG/DL — HIGH (ref 70–99)
HCO3 BLDV-SCNC: 16 MMOL/L — LOW (ref 22–29)
HCT VFR BLD CALC: 37.5 % — SIGNIFICANT CHANGE UP (ref 34.5–45)
HDLC SERPL-MCNC: 35 MG/DL — LOW
HGB BLD-MCNC: 12 G/DL — SIGNIFICANT CHANGE UP (ref 11.5–15.5)
IMM GRANULOCYTES NFR BLD AUTO: 5.4 % — HIGH (ref 0–0.9)
LACTATE BLDV-MCNC: 0.9 MMOL/L — SIGNIFICANT CHANGE UP (ref 0.5–2)
LACTATE BLDV-MCNC: 1.2 MMOL/L — SIGNIFICANT CHANGE UP (ref 0.5–2)
LEGIONELLA AG UR QL: NEGATIVE — SIGNIFICANT CHANGE UP
LIDOCAIN IGE QN: 392 U/L — HIGH (ref 7–60)
LIPID PNL WITH DIRECT LDL SERPL: 119 MG/DL — HIGH
LYMPHOCYTES # BLD AUTO: 1.93 K/UL — SIGNIFICANT CHANGE UP (ref 1–3.3)
LYMPHOCYTES # BLD AUTO: 6.7 % — LOW (ref 13–44)
MAGNESIUM SERPL-MCNC: 2 MG/DL — SIGNIFICANT CHANGE UP (ref 1.6–2.6)
MAGNESIUM SERPL-MCNC: 2.1 MG/DL — SIGNIFICANT CHANGE UP (ref 1.6–2.6)
MAGNESIUM SERPL-MCNC: 2.3 MG/DL — SIGNIFICANT CHANGE UP (ref 1.6–2.6)
MCHC RBC-ENTMCNC: 28.8 PG — SIGNIFICANT CHANGE UP (ref 27–34)
MCHC RBC-ENTMCNC: 32 G/DL — SIGNIFICANT CHANGE UP (ref 32–36)
MCV RBC AUTO: 90.1 FL — SIGNIFICANT CHANGE UP (ref 80–100)
MONOCYTES # BLD AUTO: 1.68 K/UL — HIGH (ref 0–0.9)
MONOCYTES NFR BLD AUTO: 5.9 % — SIGNIFICANT CHANGE UP (ref 2–14)
NEUTROPHILS # BLD AUTO: 23.44 K/UL — HIGH (ref 1.8–7.4)
NEUTROPHILS NFR BLD AUTO: 81.7 % — HIGH (ref 43–77)
NON HDL CHOLESTEROL: 152 MG/DL — HIGH
NRBC # BLD: 0 /100 WBCS — SIGNIFICANT CHANGE UP (ref 0–0)
PCO2 BLDV: 29 MMHG — LOW (ref 39–42)
PH BLDV: 7.34 — SIGNIFICANT CHANGE UP (ref 7.32–7.43)
PHOSPHATE SERPL-MCNC: 0.6 MG/DL — CRITICAL LOW (ref 2.5–4.5)
PHOSPHATE SERPL-MCNC: 0.6 MG/DL — CRITICAL LOW (ref 2.5–4.5)
PHOSPHATE SERPL-MCNC: 1.7 MG/DL — LOW (ref 2.5–4.5)
PHOSPHATE SERPL-MCNC: 1.8 MG/DL — LOW (ref 2.5–4.5)
PHOSPHATE SERPL-MCNC: 2.2 MG/DL — LOW (ref 2.5–4.5)
PLATELET # BLD AUTO: 275 K/UL — SIGNIFICANT CHANGE UP (ref 150–400)
PO2 BLDV: 60 MMHG — HIGH (ref 25–45)
POTASSIUM SERPL-MCNC: 2.9 MMOL/L — CRITICAL LOW (ref 3.5–5.3)
POTASSIUM SERPL-MCNC: 3.2 MMOL/L — LOW (ref 3.5–5.3)
POTASSIUM SERPL-MCNC: 3.7 MMOL/L — SIGNIFICANT CHANGE UP (ref 3.5–5.3)
POTASSIUM SERPL-MCNC: 3.9 MMOL/L — SIGNIFICANT CHANGE UP (ref 3.5–5.3)
POTASSIUM SERPL-MCNC: 4.1 MMOL/L — SIGNIFICANT CHANGE UP (ref 3.5–5.3)
POTASSIUM SERPL-SCNC: 2.9 MMOL/L — CRITICAL LOW (ref 3.5–5.3)
POTASSIUM SERPL-SCNC: 3.2 MMOL/L — LOW (ref 3.5–5.3)
POTASSIUM SERPL-SCNC: 3.7 MMOL/L — SIGNIFICANT CHANGE UP (ref 3.5–5.3)
POTASSIUM SERPL-SCNC: 3.9 MMOL/L — SIGNIFICANT CHANGE UP (ref 3.5–5.3)
POTASSIUM SERPL-SCNC: 4.1 MMOL/L — SIGNIFICANT CHANGE UP (ref 3.5–5.3)
PROCALCITONIN SERPL-MCNC: 0.8 NG/ML — HIGH (ref 0.02–0.1)
PROT SERPL-MCNC: 5.5 G/DL — LOW (ref 6–8.3)
PROT SERPL-MCNC: 5.6 G/DL — LOW (ref 6–8.3)
PROT SERPL-MCNC: 5.7 G/DL — LOW (ref 6–8.3)
PROT SERPL-MCNC: 6 G/DL — SIGNIFICANT CHANGE UP (ref 6–8.3)
PROT SERPL-MCNC: 6 G/DL — SIGNIFICANT CHANGE UP (ref 6–8.3)
RAPID RVP RESULT: DETECTED
RBC # BLD: 4.16 M/UL — SIGNIFICANT CHANGE UP (ref 3.8–5.2)
RBC # FLD: 13 % — SIGNIFICANT CHANGE UP (ref 10.3–14.5)
SAO2 % BLDV: 94.1 % — HIGH (ref 67–88)
SARS-COV-2 RNA SPEC QL NAA+PROBE: SIGNIFICANT CHANGE UP
SODIUM SERPL-SCNC: 131 MMOL/L — LOW (ref 135–145)
SODIUM SERPL-SCNC: 131 MMOL/L — LOW (ref 135–145)
SODIUM SERPL-SCNC: 133 MMOL/L — LOW (ref 135–145)
SODIUM SERPL-SCNC: 136 MMOL/L — SIGNIFICANT CHANGE UP (ref 135–145)
SODIUM SERPL-SCNC: 137 MMOL/L — SIGNIFICANT CHANGE UP (ref 135–145)
SPECIMEN SOURCE: SIGNIFICANT CHANGE UP
TRIGL SERPL-MCNC: 183 MG/DL — HIGH
WBC # BLD: 28.7 K/UL — HIGH (ref 3.8–10.5)
WBC # FLD AUTO: 28.7 K/UL — HIGH (ref 3.8–10.5)

## 2024-12-19 PROCEDURE — 99221 1ST HOSP IP/OBS SF/LOW 40: CPT

## 2024-12-19 PROCEDURE — 99291 CRITICAL CARE FIRST HOUR: CPT

## 2024-12-19 PROCEDURE — 99223 1ST HOSP IP/OBS HIGH 75: CPT | Mod: GC

## 2024-12-19 RX ORDER — BENZOCAINE AND MENTHOL 15; 3.6 MG/1; MG/1
1 LOZENGE ORAL ONCE
Refills: 0 | Status: COMPLETED | OUTPATIENT
Start: 2024-12-19 | End: 2024-12-19

## 2024-12-19 RX ORDER — ONDANSETRON 4 MG/1
4 TABLET ORAL ONCE
Refills: 0 | Status: COMPLETED | OUTPATIENT
Start: 2024-12-19 | End: 2024-12-19

## 2024-12-19 RX ORDER — POTASSIUM PHOSPHATE, MONOBASIC AND POTASSIUM PHOSPHATE, DIBASIC 224; 236 MG/ML; MG/ML
30 INJECTION, SOLUTION INTRAVENOUS ONCE
Refills: 0 | Status: COMPLETED | OUTPATIENT
Start: 2024-12-19 | End: 2024-12-19

## 2024-12-19 RX ORDER — OSELTAMIVIR 75 MG/1
75 CAPSULE ORAL
Refills: 0 | Status: DISCONTINUED | OUTPATIENT
Start: 2024-12-19 | End: 2024-12-22

## 2024-12-19 RX ORDER — CIPROFLOXACIN 3 MG/ML
5 SOLUTION OPHTHALMIC
Refills: 0 | Status: DISCONTINUED | OUTPATIENT
Start: 2024-12-19 | End: 2024-12-22

## 2024-12-19 RX ORDER — POTASSIUM CHLORIDE 600 MG/1
40 TABLET, FILM COATED, EXTENDED RELEASE ORAL ONCE
Refills: 0 | Status: DISCONTINUED | OUTPATIENT
Start: 2024-12-19 | End: 2024-12-19

## 2024-12-19 RX ORDER — INSULIN HUMAN 100 [IU]/ML
3 INJECTION, SOLUTION SUBCUTANEOUS
Qty: 100 | Refills: 0 | Status: DISCONTINUED | OUTPATIENT
Start: 2024-12-19 | End: 2024-12-20

## 2024-12-19 RX ORDER — MAGNESIUM SULFATE 500 MG/ML
2 INJECTION, SOLUTION INTRAMUSCULAR; INTRAVENOUS ONCE
Refills: 0 | Status: COMPLETED | OUTPATIENT
Start: 2024-12-19 | End: 2024-12-19

## 2024-12-19 RX ORDER — CIPROFLOXACIN AND FLUOCINOLONE ACETONIDE .75; .0625 MG/.25ML; MG/.25ML
0.25 SOLUTION AURICULAR (OTIC)
Refills: 0 | Status: DISCONTINUED | OUTPATIENT
Start: 2024-12-19 | End: 2024-12-19

## 2024-12-19 RX ORDER — POTASSIUM CHLORIDE 600 MG/1
40 TABLET, FILM COATED, EXTENDED RELEASE ORAL EVERY 4 HOURS
Refills: 0 | Status: COMPLETED | OUTPATIENT
Start: 2024-12-19 | End: 2024-12-20

## 2024-12-19 RX ORDER — ACETAMINOPHEN 80 MG/.8ML
650 SOLUTION/ DROPS ORAL EVERY 6 HOURS
Refills: 0 | Status: DISCONTINUED | OUTPATIENT
Start: 2024-12-19 | End: 2024-12-21

## 2024-12-19 RX ORDER — SODIUM BICARBONATE 84 MG/ML
0.26 INJECTION, SOLUTION INTRAVENOUS
Qty: 150 | Refills: 0 | Status: DISCONTINUED | OUTPATIENT
Start: 2024-12-19 | End: 2024-12-19

## 2024-12-19 RX ORDER — POTASSIUM CHLORIDE 600 MG/1
40 TABLET, FILM COATED, EXTENDED RELEASE ORAL ONCE
Refills: 0 | Status: COMPLETED | OUTPATIENT
Start: 2024-12-19 | End: 2024-12-19

## 2024-12-19 RX ORDER — POTASSIUM CHLORIDE 600 MG/1
10 TABLET, FILM COATED, EXTENDED RELEASE ORAL
Refills: 0 | Status: COMPLETED | OUTPATIENT
Start: 2024-12-19 | End: 2024-12-19

## 2024-12-19 RX ORDER — POTASSIUM CHLORIDE 600 MG/1
40 TABLET, FILM COATED, EXTENDED RELEASE ORAL EVERY 4 HOURS
Refills: 0 | Status: COMPLETED | OUTPATIENT
Start: 2024-12-19 | End: 2024-12-19

## 2024-12-19 RX ORDER — POTASSIUM PHOSPHATE, MONOBASIC AND POTASSIUM PHOSPHATE, DIBASIC 224; 236 MG/ML; MG/ML
30 INJECTION, SOLUTION INTRAVENOUS ONCE
Refills: 0 | Status: COMPLETED | OUTPATIENT
Start: 2024-12-19 | End: 2024-12-20

## 2024-12-19 RX ORDER — FLUTICASONE PROPIONATE 50 UG/1
1 SPRAY, METERED NASAL
Refills: 0 | Status: DISCONTINUED | OUTPATIENT
Start: 2024-12-19 | End: 2024-12-22

## 2024-12-19 RX ORDER — POTASSIUM CHLORIDE 600 MG/1
40 TABLET, FILM COATED, EXTENDED RELEASE ORAL
Refills: 0 | Status: DISCONTINUED | OUTPATIENT
Start: 2024-12-19 | End: 2024-12-19

## 2024-12-19 RX ADMIN — CHLORHEXIDINE GLUCONATE 1 APPLICATION(S): 1.2 RINSE ORAL at 05:02

## 2024-12-19 RX ADMIN — FLUTICASONE PROPIONATE 1 SPRAY(S): 50 SPRAY, METERED NASAL at 18:48

## 2024-12-19 RX ADMIN — MAGNESIUM SULFATE 25 GRAM(S): 500 INJECTION, SOLUTION INTRAMUSCULAR; INTRAVENOUS at 01:25

## 2024-12-19 RX ADMIN — POTASSIUM PHOSPHATE, MONOBASIC AND POTASSIUM PHOSPHATE, DIBASIC 125 MILLIMOLE(S): 224; 236 INJECTION, SOLUTION INTRAVENOUS at 15:17

## 2024-12-19 RX ADMIN — SODIUM CHLORIDE 150 MILLILITER(S): 9 INJECTION, SOLUTION INTRAVENOUS at 20:05

## 2024-12-19 RX ADMIN — BENZOCAINE AND MENTHOL 1 LOZENGE: 15; 3.6 LOZENGE ORAL at 16:37

## 2024-12-19 RX ADMIN — OSELTAMIVIR 75 MILLIGRAM(S): 75 CAPSULE ORAL at 22:07

## 2024-12-19 RX ADMIN — OSELTAMIVIR 75 MILLIGRAM(S): 75 CAPSULE ORAL at 14:03

## 2024-12-19 RX ADMIN — POTASSIUM CHLORIDE 40 MILLIEQUIVALENT(S): 600 TABLET, FILM COATED, EXTENDED RELEASE ORAL at 09:29

## 2024-12-19 RX ADMIN — INSULIN HUMAN 1.5 UNIT(S)/HR: 100 INJECTION, SOLUTION SUBCUTANEOUS at 21:00

## 2024-12-19 RX ADMIN — PIPERACILLIN AND TAZOBACTAM 25 GRAM(S): 3; .375 INJECTION, POWDER, LYOPHILIZED, FOR SOLUTION INTRAVENOUS at 05:01

## 2024-12-19 RX ADMIN — HEPARIN SODIUM 5000 UNIT(S): 1000 INJECTION, SOLUTION INTRAVENOUS; SUBCUTANEOUS at 13:06

## 2024-12-19 RX ADMIN — POTASSIUM CHLORIDE 100 MILLIEQUIVALENT(S): 600 TABLET, FILM COATED, EXTENDED RELEASE ORAL at 08:19

## 2024-12-19 RX ADMIN — POTASSIUM PHOSPHATE, MONOBASIC AND POTASSIUM PHOSPHATE, DIBASIC 83.33 MILLIMOLE(S): 224; 236 INJECTION, SOLUTION INTRAVENOUS at 08:19

## 2024-12-19 RX ADMIN — POTASSIUM CHLORIDE 40 MILLIEQUIVALENT(S): 600 TABLET, FILM COATED, EXTENDED RELEASE ORAL at 21:47

## 2024-12-19 RX ADMIN — POTASSIUM PHOSPHATE, MONOBASIC AND POTASSIUM PHOSPHATE, DIBASIC 83.33 MILLIMOLE(S): 224; 236 INJECTION, SOLUTION INTRAVENOUS at 01:25

## 2024-12-19 RX ADMIN — ONDANSETRON 4 MILLIGRAM(S): 4 TABLET ORAL at 03:02

## 2024-12-19 RX ADMIN — POTASSIUM CHLORIDE 100 MILLIEQUIVALENT(S): 600 TABLET, FILM COATED, EXTENDED RELEASE ORAL at 05:02

## 2024-12-19 RX ADMIN — INSULIN HUMAN 2 UNIT(S)/HR: 100 INJECTION, SOLUTION SUBCUTANEOUS at 13:06

## 2024-12-19 RX ADMIN — POTASSIUM CHLORIDE 40 MILLIEQUIVALENT(S): 600 TABLET, FILM COATED, EXTENDED RELEASE ORAL at 10:58

## 2024-12-19 RX ADMIN — POTASSIUM CHLORIDE 100 MILLIEQUIVALENT(S): 600 TABLET, FILM COATED, EXTENDED RELEASE ORAL at 09:41

## 2024-12-19 RX ADMIN — POTASSIUM CHLORIDE 100 MILLIEQUIVALENT(S): 600 TABLET, FILM COATED, EXTENDED RELEASE ORAL at 03:02

## 2024-12-19 RX ADMIN — AZITHROMYCIN MONOHYDRATE 255 MILLIGRAM(S): 200 POWDER, FOR SUSPENSION ORAL at 05:02

## 2024-12-19 RX ADMIN — MAGNESIUM SULFATE 25 GRAM(S): 500 INJECTION, SOLUTION INTRAMUSCULAR; INTRAVENOUS at 05:02

## 2024-12-19 RX ADMIN — ACETAMINOPHEN 650 MILLIGRAM(S): 80 SOLUTION/ DROPS ORAL at 12:55

## 2024-12-19 RX ADMIN — CIPROFLOXACIN 5 DROP(S): 3 SOLUTION OPHTHALMIC at 18:47

## 2024-12-19 RX ADMIN — POTASSIUM CHLORIDE 40 MILLIEQUIVALENT(S): 600 TABLET, FILM COATED, EXTENDED RELEASE ORAL at 13:07

## 2024-12-19 RX ADMIN — SODIUM CHLORIDE 100 MILLILITER(S): 9 INJECTION, SOLUTION INTRAVENOUS at 08:18

## 2024-12-19 RX ADMIN — POTASSIUM PHOSPHATE, MONOBASIC AND POTASSIUM PHOSPHATE, DIBASIC 83.33 MILLIMOLE(S): 224; 236 INJECTION, SOLUTION INTRAVENOUS at 21:46

## 2024-12-19 RX ADMIN — ACETAMINOPHEN 650 MILLIGRAM(S): 80 SOLUTION/ DROPS ORAL at 12:25

## 2024-12-19 RX ADMIN — HEPARIN SODIUM 5000 UNIT(S): 1000 INJECTION, SOLUTION INTRAVENOUS; SUBCUTANEOUS at 05:02

## 2024-12-19 RX ADMIN — HEPARIN SODIUM 5000 UNIT(S): 1000 INJECTION, SOLUTION INTRAVENOUS; SUBCUTANEOUS at 21:47

## 2024-12-19 RX ADMIN — PIPERACILLIN AND TAZOBACTAM 25 GRAM(S): 3; .375 INJECTION, POWDER, LYOPHILIZED, FOR SOLUTION INTRAVENOUS at 13:06

## 2024-12-19 RX ADMIN — PIPERACILLIN AND TAZOBACTAM 25 GRAM(S): 3; .375 INJECTION, POWDER, LYOPHILIZED, FOR SOLUTION INTRAVENOUS at 21:47

## 2024-12-19 RX ADMIN — POTASSIUM CHLORIDE 100 MILLIEQUIVALENT(S): 600 TABLET, FILM COATED, EXTENDED RELEASE ORAL at 04:05

## 2024-12-19 RX ADMIN — POTASSIUM CHLORIDE 100 MILLIEQUIVALENT(S): 600 TABLET, FILM COATED, EXTENDED RELEASE ORAL at 06:04

## 2024-12-19 NOTE — CONSULT NOTE ADULT - ASSESSMENT
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 Endocrinology consulted for DKA.    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 C-peptide 1.2 in 2016 (no glucose), KARLI and islet cell antibodies negative  - Last A1c 14.8% in 2020   - Home regimen: novolog 70/30 insulin 40 units in the AM, 30 units in the PM. Ran out 1 month ago  - eGFR: 135 mL/min/1.73m2 (12-19-24)  - Weight (kg): 58.1 (12-18-24)  - initial labs with severe DKA-> still on insulin gtt      INPATIENT PLAN:  - C/w insulin gtt until DKA resolved (bicarb >18 and AG closed x2). Once ready to transition:       - Recommend Lantus 28 units q24 hours. Can stop insulin gtt 2 hours after Lantus is given.       - Recommend Admelog 6 units TID before meals (HOLD if NPO or not eating)       - Recommend Low dose admelog correction scale TIDQAC and separate low dose scale QHS  - Please check FSG before meals and QHS, or q6h while NPO  - Inpatient glucose goals: 100-180  - consistent carb diet when eating  - check HgbA1c  - check Glutamic Acid Decarboxylase antibody, Zinc Transporter 8 Ab, Islet Antigen (IA-2) Antibody  - once improving, will plan to check c-peptide (send with BMP for glucose)      DISCHARGE PLANNING:  - Discharge recs pending clinical course and HgbA1c: anticipate likely basal/bolus insulin  - pt would benefit from CGM on discharge  - will need Endocrinology follow up. Please provide clinic info in DC paperwork for patient to make an appointment: [facility]: [address] [place] [address]. [address]. [place] [address]. Tel: [phone]    #Hypertension  - Goal BP <130/80  - Management as per primary team  - check urine microalbumin level as outpatient    #Hyperlipidemia  - LDL goal <70  - check lipid panel as outpatient    Gurjit Rey MD  Endocrine Fellow  For nonurgent matters (including consults or followup questions), please email [email or web address] or [email or web address]. For urgent matters, please call answering service at [phone] (weekdays), [phone] (nights/weekends).    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 Endocrinology consulted for DKA (high risk patient with severely uncontrolled diabetes with DKA and A1c of 14.8% at high risk of CAD and CVA with high medical complexity and high level decision-making).     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 C-peptide 1.2 in 2016 (no glucose), KARLI and islet cell antibodies negative  - Last A1c 14.8% in 2020   - Home regimen: novolog 70/30 insulin 40 units in the AM, 30 units in the PM. Ran out 1 month ago  - eGFR: 135 mL/min/1.73m2 (12-19-24)  - Weight (kg): 58.1 (12-18-24)  - initial labs with severe DKA-> still on insulin gtt      INPATIENT PLAN:  - C/w insulin gtt until DKA resolved (bicarb >18 and AG closed x2). Once ready to transition:       - Recommend Lantus 28 units q24 hours. Can stop insulin gtt 2 hours after Lantus is given.       - Recommend Admelog 6 units TID before meals (HOLD if NPO or not eating)       - Recommend Low dose admelog correction scale TIDQAC and separate low dose scale QHS  - Please check FSG before meals and QHS, or q6h while NPO  - Inpatient glucose goals: 100-180  - consistent carb diet when eating  - check HgbA1c  - check Glutamic Acid Decarboxylase antibody, Zinc Transporter 8 Ab, Islet Antigen (IA-2) Antibody  - once improving, will plan to check c-peptide (send with BMP for glucose)      DISCHARGE PLANNING:  - Discharge recs pending clinical course and HgbA1c: anticipate likely basal/bolus insulin  - pt would benefit from CGM on discharge  - will need Endocrinology follow up. Please provide clinic info in DC paperwork for patient to make an appointment: [facility]: [address] [place] [address]. [address]. [place] [address]. Tel: [phone]    #Hypertension  - Goal BP <130/80  - Management as per primary team  - check urine microalbumin level as outpatient    #Hyperlipidemia  - LDL goal <70  - check lipid panel as outpatient    Gurjit Rey MD  Endocrine Fellow  For nonurgent matters (including consults or followup questions), please email [email or web address] or [email or web address]. For urgent matters, please call answering service at [phone] (weekdays), [phone] (nights/weekends).

## 2024-12-19 NOTE — CONSULT NOTE ADULT - ASSESSMENT
Pt is a 35 year old woman with DM1 who presents for 4 days of cough, headache, and lethargy. Found to be flu+ and admitted to MICU for DKA management. Now presenting with left ear pain. On exam, left ear canal noted to have mild erythema and +TTP of tragus, TM intact with purulent effusion, no discharge.

## 2024-12-19 NOTE — CONSULT NOTE ADULT - ATTENDING COMMENTS
Agree with assessment and plan as above by Dr. Rey. Reviewed all pertinent labs, glucose values, and imaging studies. Modifications made as indicated above. Pt. with severely uncontrolled diabetes Type 1 vs. ketosis prone Type 2 still with metabolic acidosis. Continue with insulin gtt. Transition once acidosis resolves. Plan to d/c on basal bolus. Will need endocrine follow-up.     Suleiman Knight D.O  [phone]

## 2024-12-19 NOTE — CONSULT NOTE ADULT - PROBLEM SELECTOR RECOMMENDATION 9
- Recommend ciprofloxacin otic drops, 5 drops to left ear BID x10 days   - Continue IV ABX (zosyn, zithromax)  - Call with questions or concerns - Recommend ciprofloxacin otic drops, 5 drops to left ear BID x10 days   - Continue IV ABX (zosyn, zithromax)  - Flonase, 1 spray to B/L nares BID x4-6 weeks   - Patient should follow up in ENT office as an outpatient. May see Dr. Metcalf, Dr. Rosas, Dr. Clancy, Dr. Banks. Call [phone].

## 2024-12-19 NOTE — CONSULT NOTE ADULT - SUBJECTIVE AND OBJECTIVE BOX
CC: ear pain     HPI: Pt is a 35 year old woman with DM1 who presents for 4 days of cough, headache, and lethargy. On Saturday, she developed a cough and HA. Sunday, she presented to urgent care and was diagnosed with bronchitis and given albuterol. Since then she developed worsening lethargy until today when her mother brought her to the ED. Of note, she lives with her  who has the flu. In 2010, she had DKA brought on by stress which presented similarly. She takes her insulin regularly (70/30 30u am, 20u pm) but does not follow with any physicians. Admitted to MICU for DKA management. Now presenting with left ear pain. Denies n/v, tinnitus, dizziness, congestion, recent URI, otorrhea, hearing loss, hx of sx or trauma or recent travel.       PAST MEDICAL & SURGICAL HISTORY:  DKA (diabetic ketoacidoses)      Diabetes  unclear type 1 vs type at time of diagnosis      History of appendectomy        Allergies    No Known Allergies    Intolerances      MEDICATIONS  (STANDING):  azithromycin  IVPB 500 milliGRAM(s) IV Intermittent every 24 hours  chlorhexidine 2% Cloths 1 Application(s) Topical <User Schedule>  ciprofloxacin/fluocinolone Otic Solution 0.25 milliLiter(s) Left Ear two times a day  dextrose 5% + lactated ringers 1000 milliLiter(s) (100 mL/Hr) IV Continuous <Continuous>  heparin   Injectable 5000 Unit(s) SubCutaneous every 8 hours  insulin regular Infusion 6 Unit(s)/Hr (6 mL/Hr) IV Continuous <Continuous>  piperacillin/tazobactam IVPB.. 3.375 Gram(s) IV Intermittent every 8 hours  potassium chloride    Tablet ER 40 milliEquivalent(s) Oral once  potassium chloride  10 mEq/100 mL IVPB 10 milliEquivalent(s) IV Intermittent every 1 hour  sodium bicarbonate  Infusion 0.258 mEq/kG/Hr (100 mL/Hr) IV Continuous <Continuous>    MEDICATIONS  (PRN):      Social History:  · Substance use	No  · Social History (marital status, living situation, occupation, and sexual history)	lives with   social alcohol  never smoker  no drug use     Tobacco Screening:  · Core Measure Site	No  · Has the patient used tobacco in the past 30 days?	No      Family history: no pertinent family history      ROS:   ENT: all negative except as noted in HPI   CV: denies palpitations  Pulm: see hpi   GI: denies change in appetite, indigestion, n/v  : denies pertinent urinary symptoms, urgency  Neuro: denies numbness/tingling, loss of sensation  Psych: denies anxiety  MS: denies muscle weakness, instability  Heme: denies easy bruising or bleeding  Endo: denies heat/cold intolerance, excessive sweating  Vascular: denies LE edema    Vital Signs Last 24 Hrs  T(C): 38 (19 Dec 2024 08:00), Max: 38 (19 Dec 2024 08:00)  T(F): 100.4 (19 Dec 2024 08:00), Max: 100.4 (19 Dec 2024 08:00)  HR: 118 (19 Dec 2024 08:00) (97 - 135)  BP: 109/56 (19 Dec 2024 08:00) (103/86 - 127/87)  BP(mean): 79 (19 Dec 2024 08:00) (75 - 96)  RR: 27 (19 Dec 2024 08:00) (20 - 35)  SpO2: 100% (19 Dec 2024 08:00) (94% - 100%)    Parameters below as of 19 Dec 2024 08:00  Patient On (Oxygen Delivery Method): room air                              12.0   28.70 )-----------( 275      ( 18 Dec 2024 23:23 )             37.5    12-19    131[L]  |  103  |  11  ----------------------------<  208[H]  3.2[L]   |  <10[LL]  |  0.39[L]    Ca    7.5[L]      19 Dec 2024 04:01  Phos  0.6     12-19  Mg     2.1     12-19    TPro  6.0  /  Alb  2.9[L]  /  TBili  0.2  /  DBili  x   /  AST  18  /  ALT  9[L]  /  AlkPhos  102  12-19   PT/INR - ( 18 Dec 2024 23:23 )   PT: 11.7 sec;   INR: 1.03 ratio         PTT - ( 18 Dec 2024 23:23 )  PTT:24.7 sec    PHYSICAL EXAM:  Gen: NAD  Skin: No rashes, bruises, or lesions  Head: Normocephalic, Atraumatic  Face: no edema, erythema, or fluctuance. Parotid glands soft without mass  Eyes: no scleral injection  Ears: Right - ear canal clear, TM intact without effusion or erythema. No evidence of any fluid drainage. No mastoid tenderness, erythema, or ear bulging            Left - ear canal mild erythema and +TTP of tragus, TM intact with purulent effusion, no erythema. No evidence of any fluid drainage. No mastoid tenderness, erythema, or ear bulging  Nose: Nares bilaterally patent, no discharge  Mouth: No Stridor / Drooling / Trismus.  Mucosa moist, tongue/uvula midline, oropharynx clear  Neck: Flat, supple, no lymphadenopathy, trachea midline, no masses  Lymphatic: No lymphadenopathy  Resp: breathing easily, no stridor  CV: no peripheral edema/cyanosis  GI: nondistended   Peripheral vascular: no JVD or edema  Neuro: facial nerve intact, no facial droop          
HPI:  Pt is a 35 year old woman with DM1 who presents for 4 days of cough, headache, and lethargy. On Saturday, she developed a cough and HA. Sunday, she presented to urgent care and was diagnosed with bronchitis and given albuterol. Since then she developed worsening lethargy until today when her mother brought her to the ED. Of note, she lives with her  who has the flu. In 2010, she had DKA brought on by stress which presented similarly. She takes her insulin regularly (70/30 30u am, 20u pm) but does not follow with any physicians. Denies HA. Endorses SOB, excessive thirst, +n, -v, -d. Endorses feeling feverish over this period.    ED Course: , /86, RR 23, 100%RA, pH <6.9, bicarb 10-->4, UA+ for glucose, ketones.  Flu A positive. Given 2L fluids, started on insulin gtt. Admitted to MICU for DKA management. 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 C-peptide 1.2 in 2016 (no glucose), KARLI and islet cell antibodies negative  - Microvascular complications:   [  ] nephropathy, [  ] retinopathy, [  ] neuropathy  - Macrovascular complications: [  ] CAD,  [  ] CVA  - Follows with: does not have regular care, previously saw William Turpin  - Last A1c 14.8% in 2020   - Home regimen: novolog 70/30 insulin 40 units in the AM, 30 units in the PM. Ran out 1 month ago  - Previous meds: used to take trulicity and metformin  - Blood sugar: typically in the 300s at home  - Hypoglycemia episodes: none    Initial labs c/w severe DKA  Remains on insulin gtt around 3 units/hr      PAST MEDICAL & SURGICAL HISTORY:  DKA (diabetic ketoacidoses)      Diabetes  unclear type 1 vs type at time of diagnosis      History of appendectomy          FAMILY HISTORY:      HOME MEDS:  NovoLOG Mix 70/30 FlexPen subcutaneous suspension: 30 unit(s) subcutaneous once a day (in the morning)  NovoLOG Mix 70/30 FlexPen subcutaneous suspension: 20 unit(s) subcutaneous once a day (in the evening)      MEDICATIONS  (STANDING):  azithromycin  IVPB 500 milliGRAM(s) IV Intermittent every 24 hours  benzocaine/menthol Lozenge 1 Lozenge Oral once  chlorhexidine 2% Cloths 1 Application(s) Topical <User Schedule>  ciprofloxacin/fluocinolone Otic Solution 0.25 milliLiter(s) Left Ear two times a day  dextrose 5% + lactated ringers 1000 milliLiter(s) (50 mL/Hr) IV Continuous <Continuous>  heparin   Injectable 5000 Unit(s) SubCutaneous every 8 hours  insulin regular Infusion 3 Unit(s)/Hr (3 mL/Hr) IV Continuous <Continuous>  oseltamivir Suspension 75 milliGRAM(s) Oral two times a day  piperacillin/tazobactam IVPB.. 3.375 Gram(s) IV Intermittent every 8 hours    MEDICATIONS  (PRN):  acetaminophen   Oral Liquid .. 650 milliGRAM(s) Oral every 6 hours PRN Temp greater or equal to 38C (100.4F)      Allergies    No Known Allergies    Intolerances      Review of Systems:  Constitutional: No fever  Eyes: No blurry vision  Neuro: No tremors  HEENT: No pain  Cardiovascular: No chest pain, palpitations  Respiratory: No SOB, no cough  GI: No nausea, vomiting, abdominal pain  : No dysuria  Skin: no rash  Psych: no depression  Endocrine: no polyuria, polydipsia  Hem/lymph: no swelling  Osteoporosis: no fractures    ALL OTHER SYSTEMS REVIEWED AND NEGATIVE    PHYSICAL EXAM:  VITALS: T(C): 37.8 (12-19-24 @ 15:00)  T(F): 100.1 (12-19-24 @ 15:00), Max: 101.5 (12-19-24 @ 12:00)  HR: 119 (12-19-24 @ 15:00) (97 - 135)  BP: 99/57 (12-19-24 @ 15:00) (99/57 - 127/87)  RR:  (20 - 35)  SpO2:  (94% - 100%)  Wt(kg): --  GENERAL: NAD, well-groomed, well-developed  EYES: No proptosis, no lid lag, anicteric  HEENT:  Atraumatic, Normocephalic, moist mucous membranes  RESPIRATORY: Normal respiratory effort; no audible wheezing  SKIN: Dry, intact, No rashes or lesions  MUSCULOSKELETAL: Full range of motion, normal strength  NEURO: sensation intact, extraocular movements intact, no tremor  PSYCH: Alert and oriented x 3, normal affect, normal mood  CUSHING'S SIGNS: no striae                          12.0   28.70 )-----------( 275      ( 18 Dec 2024 23:23 )             37.5       12-19    131[L]  |  101  |  6[L]  ----------------------------<  230[H]  3.9   |  <10[LL]  |  0.38[L]    eGFR: 135    Ca    6.9[L]      12-19  Mg     2.1     12-19  Phos  1.7     12-19    TPro  6.0  /  Alb  2.8[L]  /  TBili  0.2  /  DBili  x   /  AST  17  /  ALT  7[L]  /  AlkPhos  98  12-19      POC blood glucose:  217 mg/dL (12-19-24 @ 15:01)  237 mg/dL (12-19-24 @ 14:05)  253 mg/dL (12-19-24 @ 13:09)  210 mg/dL (12-19-24 @ 11:05)  209 mg/dL (12-19-24 @ 10:13)  225 mg/dL (12-19-24 @ 09:04)  204 mg/dL (12-19-24 @ 08:15)  226 mg/dL (12-19-24 @ 06:53)  249 mg/dL (12-19-24 @ 06:11)  201 mg/dL (12-19-24 @ 05:12)  203 mg/dL (12-19-24 @ 03:50)  225 mg/dL (12-19-24 @ 03:05)  207 mg/dL (12-19-24 @ 02:03)  213 mg/dL (12-19-24 @ 01:01)  270 mg/dL (12-19-24 @ 00:07)  261 mg/dL (12-18-24 @ 23:16)  289 mg/dL (12-18-24 @ 22:03)  363 mg/dL (12-18-24 @ 21:10)  355 mg/dL (12-18-24 @ 19:50)  392 mg/dL (12-18-24 @ 18:46)  379 mg/dL (12-18-24 @ 16:47)  poct    eMAR:    insulin regular Infusion   5 mL/Hr IV Continuous (12-18-24 @ 18:49)   5 mL/Hr IV Continuous (12-18-24 @ 18:10)    insulin regular Infusion   2 mL/Hr IV Continuous (12-19-24 @ 12:18)

## 2024-12-20 LAB
A1C WITH ESTIMATED AVERAGE GLUCOSE RESULT: >15.5 % — HIGH (ref 4–5.6)
ALBUMIN SERPL ELPH-MCNC: 2.3 G/DL — LOW (ref 3.3–5)
ALBUMIN SERPL ELPH-MCNC: 2.4 G/DL — LOW (ref 3.3–5)
ALBUMIN SERPL ELPH-MCNC: 2.5 G/DL — LOW (ref 3.3–5)
ALBUMIN SERPL ELPH-MCNC: 2.6 G/DL — LOW (ref 3.3–5)
ALP SERPL-CCNC: 79 U/L — SIGNIFICANT CHANGE UP (ref 40–120)
ALP SERPL-CCNC: 79 U/L — SIGNIFICANT CHANGE UP (ref 40–120)
ALP SERPL-CCNC: 80 U/L — SIGNIFICANT CHANGE UP (ref 40–120)
ALP SERPL-CCNC: 86 U/L — SIGNIFICANT CHANGE UP (ref 40–120)
ALT FLD-CCNC: 5 U/L — LOW (ref 10–45)
ALT FLD-CCNC: 6 U/L — LOW (ref 10–45)
ALT FLD-CCNC: 7 U/L — LOW (ref 10–45)
ALT FLD-CCNC: 8 U/L — LOW (ref 10–45)
ANION GAP SERPL CALC-SCNC: 10 MMOL/L — SIGNIFICANT CHANGE UP (ref 5–17)
ANION GAP SERPL CALC-SCNC: 10 MMOL/L — SIGNIFICANT CHANGE UP (ref 5–17)
ANION GAP SERPL CALC-SCNC: 11 MMOL/L — SIGNIFICANT CHANGE UP (ref 5–17)
ANION GAP SERPL CALC-SCNC: 12 MMOL/L — SIGNIFICANT CHANGE UP (ref 5–17)
APTT BLD: 25.8 SEC — SIGNIFICANT CHANGE UP (ref 24.5–35.6)
AST SERPL-CCNC: 11 U/L — SIGNIFICANT CHANGE UP (ref 10–40)
AST SERPL-CCNC: 12 U/L — SIGNIFICANT CHANGE UP (ref 10–40)
AST SERPL-CCNC: 12 U/L — SIGNIFICANT CHANGE UP (ref 10–40)
AST SERPL-CCNC: 14 U/L — SIGNIFICANT CHANGE UP (ref 10–40)
B-OH-BUTYR SERPL-SCNC: 1.7 MMOL/L — HIGH
BASE EXCESS BLDV CALC-SCNC: -3.6 MMOL/L — LOW (ref -2–3)
BASE EXCESS BLDV CALC-SCNC: -4.8 MMOL/L — LOW (ref -2–3)
BASE EXCESS BLDV CALC-SCNC: -6.8 MMOL/L — LOW (ref -2–3)
BASOPHILS # BLD AUTO: 0.01 K/UL — SIGNIFICANT CHANGE UP (ref 0–0.2)
BASOPHILS NFR BLD AUTO: 0.1 % — SIGNIFICANT CHANGE UP (ref 0–2)
BILIRUB SERPL-MCNC: 0.2 MG/DL — SIGNIFICANT CHANGE UP (ref 0.2–1.2)
BILIRUB SERPL-MCNC: 0.3 MG/DL — SIGNIFICANT CHANGE UP (ref 0.2–1.2)
BUN SERPL-MCNC: <4 MG/DL — LOW (ref 7–23)
C PEPTIDE SERPL-MCNC: 0.3 NG/ML — LOW (ref 1.1–4.4)
CALCIUM SERPL-MCNC: 6.5 MG/DL — CRITICAL LOW (ref 8.4–10.5)
CALCIUM SERPL-MCNC: 6.5 MG/DL — CRITICAL LOW (ref 8.4–10.5)
CALCIUM SERPL-MCNC: 7 MG/DL — LOW (ref 8.4–10.5)
CALCIUM SERPL-MCNC: 7.1 MG/DL — LOW (ref 8.4–10.5)
CHLORIDE SERPL-SCNC: 103 MMOL/L — SIGNIFICANT CHANGE UP (ref 96–108)
CHLORIDE SERPL-SCNC: 106 MMOL/L — SIGNIFICANT CHANGE UP (ref 96–108)
CHLORIDE SERPL-SCNC: 106 MMOL/L — SIGNIFICANT CHANGE UP (ref 96–108)
CHLORIDE SERPL-SCNC: 107 MMOL/L — SIGNIFICANT CHANGE UP (ref 96–108)
CO2 BLDV-SCNC: 19 MMOL/L — LOW (ref 22–26)
CO2 BLDV-SCNC: 21 MMOL/L — LOW (ref 22–26)
CO2 BLDV-SCNC: 22 MMOL/L — SIGNIFICANT CHANGE UP (ref 22–26)
CO2 SERPL-SCNC: 16 MMOL/L — LOW (ref 22–31)
CO2 SERPL-SCNC: 17 MMOL/L — LOW (ref 22–31)
CO2 SERPL-SCNC: 18 MMOL/L — LOW (ref 22–31)
CO2 SERPL-SCNC: 19 MMOL/L — LOW (ref 22–31)
CREAT SERPL-MCNC: <0.3 MG/DL — LOW (ref 0.5–1.3)
EGFR: 143 ML/MIN/1.73M2 — SIGNIFICANT CHANGE UP
EOSINOPHIL # BLD AUTO: 0 K/UL — SIGNIFICANT CHANGE UP (ref 0–0.5)
EOSINOPHIL NFR BLD AUTO: 0 % — SIGNIFICANT CHANGE UP (ref 0–6)
ESTIMATED AVERAGE GLUCOSE: >398 MG/DL — HIGH (ref 68–114)
FERRITIN SERPL-MCNC: 89 NG/ML — SIGNIFICANT CHANGE UP (ref 15–150)
GAS PNL BLDV: SIGNIFICANT CHANGE UP
GAS PNL BLDV: SIGNIFICANT CHANGE UP
GLUCOSE BLDC GLUCOMTR-MCNC: 114 MG/DL — HIGH (ref 70–99)
GLUCOSE BLDC GLUCOMTR-MCNC: 127 MG/DL — HIGH (ref 70–99)
GLUCOSE BLDC GLUCOMTR-MCNC: 131 MG/DL — HIGH (ref 70–99)
GLUCOSE BLDC GLUCOMTR-MCNC: 135 MG/DL — HIGH (ref 70–99)
GLUCOSE BLDC GLUCOMTR-MCNC: 155 MG/DL — HIGH (ref 70–99)
GLUCOSE BLDC GLUCOMTR-MCNC: 156 MG/DL — HIGH (ref 70–99)
GLUCOSE BLDC GLUCOMTR-MCNC: 158 MG/DL — HIGH (ref 70–99)
GLUCOSE BLDC GLUCOMTR-MCNC: 160 MG/DL — HIGH (ref 70–99)
GLUCOSE BLDC GLUCOMTR-MCNC: 164 MG/DL — HIGH (ref 70–99)
GLUCOSE BLDC GLUCOMTR-MCNC: 170 MG/DL — HIGH (ref 70–99)
GLUCOSE BLDC GLUCOMTR-MCNC: 188 MG/DL — HIGH (ref 70–99)
GLUCOSE BLDC GLUCOMTR-MCNC: 189 MG/DL — HIGH (ref 70–99)
GLUCOSE BLDC GLUCOMTR-MCNC: 192 MG/DL — HIGH (ref 70–99)
GLUCOSE BLDC GLUCOMTR-MCNC: 199 MG/DL — HIGH (ref 70–99)
GLUCOSE BLDC GLUCOMTR-MCNC: 200 MG/DL — HIGH (ref 70–99)
GLUCOSE BLDC GLUCOMTR-MCNC: 200 MG/DL — HIGH (ref 70–99)
GLUCOSE BLDC GLUCOMTR-MCNC: 202 MG/DL — HIGH (ref 70–99)
GLUCOSE BLDC GLUCOMTR-MCNC: 203 MG/DL — HIGH (ref 70–99)
GLUCOSE SERPL-MCNC: 161 MG/DL — HIGH (ref 70–99)
GLUCOSE SERPL-MCNC: 164 MG/DL — HIGH (ref 70–99)
GLUCOSE SERPL-MCNC: 202 MG/DL — HIGH (ref 70–99)
GLUCOSE SERPL-MCNC: 209 MG/DL — HIGH (ref 70–99)
HCO3 BLDV-SCNC: 18 MMOL/L — LOW (ref 22–29)
HCO3 BLDV-SCNC: 20 MMOL/L — LOW (ref 22–29)
HCO3 BLDV-SCNC: 21 MMOL/L — LOW (ref 22–29)
HCT VFR BLD CALC: 24.1 % — LOW (ref 34.5–45)
HGB BLD-MCNC: 8.4 G/DL — LOW (ref 11.5–15.5)
IMM GRANULOCYTES NFR BLD AUTO: 0.5 % — SIGNIFICANT CHANGE UP (ref 0–0.9)
INR BLD: 1.11 RATIO — SIGNIFICANT CHANGE UP (ref 0.85–1.16)
IRON SATN MFR SERPL: 13 % — LOW (ref 14–50)
IRON SATN MFR SERPL: 31 UG/DL — SIGNIFICANT CHANGE UP (ref 30–160)
LYMPHOCYTES # BLD AUTO: 1.21 K/UL — SIGNIFICANT CHANGE UP (ref 1–3.3)
LYMPHOCYTES # BLD AUTO: 11.9 % — LOW (ref 13–44)
MAGNESIUM SERPL-MCNC: 1.9 MG/DL — SIGNIFICANT CHANGE UP (ref 1.6–2.6)
MAGNESIUM SERPL-MCNC: 2 MG/DL — SIGNIFICANT CHANGE UP (ref 1.6–2.6)
MAGNESIUM SERPL-MCNC: 2.3 MG/DL — SIGNIFICANT CHANGE UP (ref 1.6–2.6)
MAGNESIUM SERPL-MCNC: 2.6 MG/DL — SIGNIFICANT CHANGE UP (ref 1.6–2.6)
MCHC RBC-ENTMCNC: 28.4 PG — SIGNIFICANT CHANGE UP (ref 27–34)
MCHC RBC-ENTMCNC: 34.9 G/DL — SIGNIFICANT CHANGE UP (ref 32–36)
MCV RBC AUTO: 81.4 FL — SIGNIFICANT CHANGE UP (ref 80–100)
MONOCYTES # BLD AUTO: 0.84 K/UL — SIGNIFICANT CHANGE UP (ref 0–0.9)
MONOCYTES NFR BLD AUTO: 8.3 % — SIGNIFICANT CHANGE UP (ref 2–14)
NEUTROPHILS # BLD AUTO: 8.02 K/UL — HIGH (ref 1.8–7.4)
NEUTROPHILS NFR BLD AUTO: 79.2 % — HIGH (ref 43–77)
NRBC # BLD: 0 /100 WBCS — SIGNIFICANT CHANGE UP (ref 0–0)
PCO2 BLDV: 32 MMHG — LOW (ref 39–42)
PCO2 BLDV: 34 MMHG — LOW (ref 39–42)
PCO2 BLDV: 34 MMHG — LOW (ref 39–42)
PH BLDV: 7.35 — SIGNIFICANT CHANGE UP (ref 7.32–7.43)
PH BLDV: 7.37 — SIGNIFICANT CHANGE UP (ref 7.32–7.43)
PH BLDV: 7.39 — SIGNIFICANT CHANGE UP (ref 7.32–7.43)
PHOSPHATE SERPL-MCNC: 1.9 MG/DL — LOW (ref 2.5–4.5)
PHOSPHATE SERPL-MCNC: 2.2 MG/DL — LOW (ref 2.5–4.5)
PHOSPHATE SERPL-MCNC: 2.6 MG/DL — SIGNIFICANT CHANGE UP (ref 2.5–4.5)
PHOSPHATE SERPL-MCNC: 2.7 MG/DL — SIGNIFICANT CHANGE UP (ref 2.5–4.5)
PLATELET # BLD AUTO: 223 K/UL — SIGNIFICANT CHANGE UP (ref 150–400)
PO2 BLDV: 52 MMHG — HIGH (ref 25–45)
PO2 BLDV: 56 MMHG — HIGH (ref 25–45)
PO2 BLDV: 79 MMHG — HIGH (ref 25–45)
POTASSIUM SERPL-MCNC: 3.7 MMOL/L — SIGNIFICANT CHANGE UP (ref 3.5–5.3)
POTASSIUM SERPL-MCNC: 3.9 MMOL/L — SIGNIFICANT CHANGE UP (ref 3.5–5.3)
POTASSIUM SERPL-MCNC: 4.2 MMOL/L — SIGNIFICANT CHANGE UP (ref 3.5–5.3)
POTASSIUM SERPL-MCNC: 4.2 MMOL/L — SIGNIFICANT CHANGE UP (ref 3.5–5.3)
POTASSIUM SERPL-SCNC: 3.7 MMOL/L — SIGNIFICANT CHANGE UP (ref 3.5–5.3)
POTASSIUM SERPL-SCNC: 3.9 MMOL/L — SIGNIFICANT CHANGE UP (ref 3.5–5.3)
POTASSIUM SERPL-SCNC: 4.2 MMOL/L — SIGNIFICANT CHANGE UP (ref 3.5–5.3)
POTASSIUM SERPL-SCNC: 4.2 MMOL/L — SIGNIFICANT CHANGE UP (ref 3.5–5.3)
PROT SERPL-MCNC: 5.2 G/DL — LOW (ref 6–8.3)
PROT SERPL-MCNC: 5.2 G/DL — LOW (ref 6–8.3)
PROT SERPL-MCNC: 5.3 G/DL — LOW (ref 6–8.3)
PROT SERPL-MCNC: 5.4 G/DL — LOW (ref 6–8.3)
PROTHROM AB SERPL-ACNC: 12.8 SEC — SIGNIFICANT CHANGE UP (ref 9.9–13.4)
RBC # BLD: 2.89 M/UL — LOW (ref 3.8–5.2)
RBC # BLD: 2.96 M/UL — LOW (ref 3.8–5.2)
RBC # FLD: 13.4 % — SIGNIFICANT CHANGE UP (ref 10.3–14.5)
RETICS #: 41 K/UL — SIGNIFICANT CHANGE UP (ref 25–125)
RETICS/RBC NFR: 1.4 % — SIGNIFICANT CHANGE UP (ref 0.5–2.5)
SAO2 % BLDV: 91.5 % — HIGH (ref 67–88)
SAO2 % BLDV: 92.4 % — HIGH (ref 67–88)
SAO2 % BLDV: 98 % — HIGH (ref 67–88)
SODIUM SERPL-SCNC: 132 MMOL/L — LOW (ref 135–145)
SODIUM SERPL-SCNC: 134 MMOL/L — LOW (ref 135–145)
SODIUM SERPL-SCNC: 134 MMOL/L — LOW (ref 135–145)
SODIUM SERPL-SCNC: 135 MMOL/L — SIGNIFICANT CHANGE UP (ref 135–145)
TIBC SERPL-MCNC: 234 UG/DL — SIGNIFICANT CHANGE UP (ref 220–430)
TSH SERPL-MCNC: 0.76 UIU/ML — SIGNIFICANT CHANGE UP (ref 0.27–4.2)
UIBC SERPL-MCNC: 203 UG/DL — SIGNIFICANT CHANGE UP (ref 110–370)
WBC # BLD: 10.13 K/UL — SIGNIFICANT CHANGE UP (ref 3.8–10.5)
WBC # FLD AUTO: 10.13 K/UL — SIGNIFICANT CHANGE UP (ref 3.8–10.5)

## 2024-12-20 PROCEDURE — 74177 CT ABD & PELVIS W/CONTRAST: CPT | Mod: 26

## 2024-12-20 PROCEDURE — 70481 CT ORBIT/EAR/FOSSA W/DYE: CPT | Mod: 26

## 2024-12-20 PROCEDURE — 99233 SBSQ HOSP IP/OBS HIGH 50: CPT | Mod: GC

## 2024-12-20 PROCEDURE — 99232 SBSQ HOSP IP/OBS MODERATE 35: CPT | Mod: GC

## 2024-12-20 RX ORDER — SOD PHOS DI, MONO/K PHOS MONO 250 MG
1 TABLET ORAL EVERY 6 HOURS
Refills: 0 | Status: DISCONTINUED | OUTPATIENT
Start: 2024-12-20 | End: 2024-12-22

## 2024-12-20 RX ORDER — POTASSIUM CHLORIDE 600 MG/1
40 TABLET, FILM COATED, EXTENDED RELEASE ORAL
Refills: 0 | Status: DISCONTINUED | OUTPATIENT
Start: 2024-12-20 | End: 2024-12-20

## 2024-12-20 RX ORDER — MAGNESIUM SULFATE 500 MG/ML
2 INJECTION, SOLUTION INTRAMUSCULAR; INTRAVENOUS ONCE
Refills: 0 | Status: COMPLETED | OUTPATIENT
Start: 2024-12-20 | End: 2024-12-20

## 2024-12-20 RX ORDER — INSULIN LISPRO 100/ML
6 VIAL (ML) SUBCUTANEOUS
Refills: 0 | Status: DISCONTINUED | OUTPATIENT
Start: 2024-12-20 | End: 2024-12-22

## 2024-12-20 RX ORDER — INSULIN LISPRO 100/ML
VIAL (ML) SUBCUTANEOUS
Refills: 0 | Status: DISCONTINUED | OUTPATIENT
Start: 2024-12-20 | End: 2024-12-22

## 2024-12-20 RX ORDER — INSULIN GLARGINE-YFGN 100 [IU]/ML
28 INJECTION, SOLUTION SUBCUTANEOUS EVERY MORNING
Refills: 0 | Status: DISCONTINUED | OUTPATIENT
Start: 2024-12-20 | End: 2024-12-21

## 2024-12-20 RX ORDER — POTASSIUM CHLORIDE 600 MG/1
40 TABLET, FILM COATED, EXTENDED RELEASE ORAL EVERY 4 HOURS
Refills: 0 | Status: COMPLETED | OUTPATIENT
Start: 2024-12-20 | End: 2024-12-20

## 2024-12-20 RX ORDER — INSULIN LISPRO 100/ML
VIAL (ML) SUBCUTANEOUS AT BEDTIME
Refills: 0 | Status: DISCONTINUED | OUTPATIENT
Start: 2024-12-20 | End: 2024-12-22

## 2024-12-20 RX ADMIN — Medication 1 TABLET(S): at 17:21

## 2024-12-20 RX ADMIN — POTASSIUM CHLORIDE 40 MILLIEQUIVALENT(S): 600 TABLET, FILM COATED, EXTENDED RELEASE ORAL at 02:14

## 2024-12-20 RX ADMIN — MAGNESIUM SULFATE 25 GRAM(S): 500 INJECTION, SOLUTION INTRAMUSCULAR; INTRAVENOUS at 07:02

## 2024-12-20 RX ADMIN — PIPERACILLIN AND TAZOBACTAM 25 GRAM(S): 3; .375 INJECTION, POWDER, LYOPHILIZED, FOR SOLUTION INTRAVENOUS at 05:29

## 2024-12-20 RX ADMIN — Medication 1 TABLET(S): at 13:26

## 2024-12-20 RX ADMIN — HEPARIN SODIUM 5000 UNIT(S): 1000 INJECTION, SOLUTION INTRAVENOUS; SUBCUTANEOUS at 05:27

## 2024-12-20 RX ADMIN — CIPROFLOXACIN 5 DROP(S): 3 SOLUTION OPHTHALMIC at 17:23

## 2024-12-20 RX ADMIN — SODIUM CHLORIDE 100 MILLILITER(S): 9 INJECTION, SOLUTION INTRAVENOUS at 00:29

## 2024-12-20 RX ADMIN — CIPROFLOXACIN 5 DROP(S): 3 SOLUTION OPHTHALMIC at 05:28

## 2024-12-20 RX ADMIN — OSELTAMIVIR 75 MILLIGRAM(S): 75 CAPSULE ORAL at 21:30

## 2024-12-20 RX ADMIN — CHLORHEXIDINE GLUCONATE 1 APPLICATION(S): 1.2 RINSE ORAL at 05:29

## 2024-12-20 RX ADMIN — INSULIN GLARGINE-YFGN 28 UNIT(S): 100 INJECTION, SOLUTION SUBCUTANEOUS at 10:49

## 2024-12-20 RX ADMIN — Medication 6 UNIT(S): at 17:21

## 2024-12-20 RX ADMIN — HEPARIN SODIUM 5000 UNIT(S): 1000 INJECTION, SOLUTION INTRAVENOUS; SUBCUTANEOUS at 13:26

## 2024-12-20 RX ADMIN — FLUTICASONE PROPIONATE 1 SPRAY(S): 50 SPRAY, METERED NASAL at 17:23

## 2024-12-20 RX ADMIN — Medication 1: at 17:22

## 2024-12-20 RX ADMIN — PIPERACILLIN AND TAZOBACTAM 25 GRAM(S): 3; .375 INJECTION, POWDER, LYOPHILIZED, FOR SOLUTION INTRAVENOUS at 13:29

## 2024-12-20 RX ADMIN — POTASSIUM PHOSPHATE, MONOBASIC AND POTASSIUM PHOSPHATE, DIBASIC 83.33 MILLIMOLE(S): 224; 236 INJECTION, SOLUTION INTRAVENOUS at 04:38

## 2024-12-20 RX ADMIN — HEPARIN SODIUM 5000 UNIT(S): 1000 INJECTION, SOLUTION INTRAVENOUS; SUBCUTANEOUS at 21:27

## 2024-12-20 RX ADMIN — PIPERACILLIN AND TAZOBACTAM 25 GRAM(S): 3; .375 INJECTION, POWDER, LYOPHILIZED, FOR SOLUTION INTRAVENOUS at 21:27

## 2024-12-20 RX ADMIN — FLUTICASONE PROPIONATE 1 SPRAY(S): 50 SPRAY, METERED NASAL at 05:28

## 2024-12-20 RX ADMIN — POTASSIUM CHLORIDE 40 MILLIEQUIVALENT(S): 600 TABLET, FILM COATED, EXTENDED RELEASE ORAL at 10:49

## 2024-12-20 RX ADMIN — POTASSIUM CHLORIDE 40 MILLIEQUIVALENT(S): 600 TABLET, FILM COATED, EXTENDED RELEASE ORAL at 05:27

## 2024-12-20 RX ADMIN — OSELTAMIVIR 75 MILLIGRAM(S): 75 CAPSULE ORAL at 10:49

## 2024-12-20 RX ADMIN — Medication 6 UNIT(S): at 13:34

## 2024-12-20 NOTE — DIETITIAN INITIAL EVALUATION ADULT - REASON FOR ADMISSION
per chart: "hx of type 1 DM on 70/30 who presents  with 5 days of cough/fatigue/nausea and found to have flu A positive and labs c/w DKA,  pH <6.90, HCO3 = 3.0, BHB >8, glucosuria, ketonuria, initial serum glucose 379, s/p 2L bolus in ED and started on insulin drip. Pt also with leukocytosis, fever, cough- treating empirically for superimposed bacterial PNA.  Patient admitted to MICU for further management of DKA."

## 2024-12-20 NOTE — DIETITIAN INITIAL EVALUATION ADULT - PHYSCIAL ASSESSMENT
no weight history within past 3 years per [facility] HIE.   Noted weight 63.5kg 12/18 per HIE  Dosing weight 58.1kg 12/18 - ? accuracy  per pt profile, pt unsure of any weight loss PTA.   RD will continue to monitor trends.

## 2024-12-20 NOTE — PROGRESS NOTE ADULT - ATTENDING COMMENTS
The patient is a 34 Y.O. F with type 1 DM who presents with severe DKA, severe acidosis, also found to have FLU A as well as ear infection.     # DKA in Type 1 DM  # Severe acidosis  # FLU A  # Ear infection  - Severe DKA, profound acidosis in setting of Otiis as well as FLU  - C/W insulin gtt, temp hold for low K, in the setting of bicarb gtt and insulin. Endocrine consulted.   - FSG q1 hours, Q4 hours labs, pending a1c  - Ear infection, fevers, fu with culture, C/W zosyn. ENT consulted  - Given worsening fevers, and high risk patient will start tamiflu  - DVT ppx- SQH  - Dispo- full code.
1. DKA secondary to H influenza.  Pt with normal AG and serum bicarb > 18. Transitioned this am to Lantus with pre meal insulin.  2. ID. Influenza B. Finish course of Tamiflu.          Bradshaw infection externa on Cipro drops. For CT ear. Continue Zosyn until CT results back.  Ear clinically improving.  3.DVT prophylaxis; SQ heparin  4. GOC; Full code
Agree with assessment and plan as above by Dr. Rey. Reviewed all pertinent labs, glucose values, and imaging studies. Modifications made as indicated above. Pt. with severely uncontrolled diabetes Type 1 vs. ketosis prone Type 2 still with metabolic acidosis. Now transitioned off insulin gtt. Continue to monitor on basal bolus with dose adjustment as needed. Will need endocrine follow-up.     Suleiman Knight D.O  [phone] .

## 2024-12-20 NOTE — DIETITIAN INITIAL EVALUATION ADULT - ADD RECOMMEND
1) Will continue to monitor weight, labs, skin, GI status and diet advancement 2) nutrition risk placed in chart 3) when able to advance diet, recommend consistent carbohydrate if PO intake adequate.  1) Will continue to monitor weight, labs, skin, GI status and diet advancement 2) nutrition risk placed in chart 3) when able to advance diet, recommend consistent carbohydrate if PO intake adequate with glucerna oral supplement daily

## 2024-12-20 NOTE — DIETITIAN INITIAL EVALUATION ADULT - ORAL INTAKE PTA/DIET HISTORY
visited pt at bedside this morning. sleeping, per RN in and out sleep. inappropriate to waken at this time. per pt profile, has been eating poorly because of a decreased appetite PTA. no known allergies noted in electronic medical record

## 2024-12-20 NOTE — CHART NOTE - NSCHARTNOTEFT_GEN_A_CORE
MAR MICU TRANSFER NOTE    Please refer to MICU transfer note for full details.    Briefly, this is a Patient with hx of type 1 DM on 70/30 who presents  with 5 days of cough/fatigue/nausea and found to have flu A positive and labs c/w DKA,  pH <6.90, HCO3 = 3.0, BHB >8, glucosuria, ketonuria, initial serum glucose 379, s/p 2L bolus in ED and started on insulin drip. Pt also with leukocytosis, fever, cough- treating empirically for superimposed bacterial PNA.  Patient admitted to MICU for further management of DKA.      FOR FOLLOW UP:  [ ] endocrine recs  [ ] c/w tamiflu     Vital Signs Last 24 Hrs  T(C): 36.8 (20 Dec 2024 20:00), Max: 37.2 (20 Dec 2024 00:00)  T(F): 98.2 (20 Dec 2024 20:00), Max: 99 (20 Dec 2024 00:00)  HR: 104 (20 Dec 2024 20:00) (97 - 107)  BP: 108/63 (20 Dec 2024 20:00) (91/55 - 110/58)  BP(mean): 80 (20 Dec 2024 20:00) (65 - 80)  RR: 26 (20 Dec 2024 20:00) (12 - 30)  SpO2: 97% (20 Dec 2024 20:00) (97% - 99%)    Parameters below as of 20 Dec 2024 20:00  Patient On (Oxygen Delivery Method): room air      I&O's Summary    19 Dec 2024 07:01  -  20 Dec 2024 07:00  --------------------------------------------------------  IN: 5524.4 mL / OUT: 5590 mL / NET: -65.6 mL    20 Dec 2024 07:01  -  20 Dec 2024 21:46  --------------------------------------------------------  IN: 1868.2 mL / OUT: 2800 mL / NET: -931.8 mL      Allergies    No Known Allergies    Intolerances      MEDICATIONS  (STANDING):  ciprofloxacin  0.3% Ophthalmic Solution for Otic Use 5 Drop(s) Left Ear two times a day  fluticasone propionate 50 MICROgram(s)/spray Nasal Spray 1 Spray(s) Both Nostrils two times a day  heparin   Injectable 5000 Unit(s) SubCutaneous every 8 hours  insulin glargine Injectable (LANTUS) 28 Unit(s) SubCutaneous every morning  insulin lispro (ADMELOG) corrective regimen sliding scale   SubCutaneous three times a day before meals  insulin lispro (ADMELOG) corrective regimen sliding scale   SubCutaneous at bedtime  insulin lispro Injectable (ADMELOG) 6 Unit(s) SubCutaneous three times a day before meals  oseltamivir Suspension 75 milliGRAM(s) Oral two times a day  piperacillin/tazobactam IVPB.. 3.375 Gram(s) IV Intermittent every 8 hours  potassium phosphate / sodium phosphate Tablet (K-PHOS No. 2) 1 Tablet(s) Oral every 6 hours    MEDICATIONS  (PRN):  acetaminophen   Oral Liquid .. 650 milliGRAM(s) Oral every 6 hours PRN Temp greater or equal to 38C (100.4F)                                  8.4    10.13 )-----------( 223      ( 20 Dec 2024 00:32 )             24.1     12-20    132[L]  |  103  |  <4[L]  ----------------------------<  161[H]  4.2   |  19[L]  |  <0.30[L]    Ca    7.1[L]      20 Dec 2024 14:21  Phos  1.9     12-20  Mg     2.3     12-20    TPro  5.4[L]  /  Alb  2.6[L]  /  TBili  0.3  /  DBili  x   /  AST  14  /  ALT  6[L]  /  AlkPhos  86  12-20    PT/INR - ( 20 Dec 2024 00:32 )   PT: 12.8 sec;   INR: 1.11 ratio         PTT - ( 20 Dec 2024 00:32 )  PTT:25.8 sec        ASSESSMENT & PLAN:     NEURO  #No active issues  -A&O x 3, more awake today  -c/o some pain in the left ear, pt with Otitis Externa  -Activity as tolerated    CV  #no active issues  -Hemodynamically Stable  -Lipid profile pending    RESPIRATORY  -No active issues  -on RA 02sat>95%    RENAL  #HAGMA  - ph <6.90 x2; bicarb 3 in setting of DKA  - improvement with fluid resuscitation and insulin gtt  - s/p bicarb gtt @ 100cc/hr  - Electrolyte replacement    GI  #Diet  - Carb consistent diet    ENDOCRINE  #DKA  #Type 1 Diabetes mellitus  - home regimen 70/30: 30u am 20u pm  - seemingly iso influenza infxn, unclear if adherent to insulin last few days while sick  - in ED pH <6.90, BHB >8, glucosuria, ketonuria, initial serum glucose 379  - s/p insulin gtt  -Transitioned today with Lantus 28units, admelog 6units premeal, insulin sliding scale premeal and bedtime  -A1c pending  -TSH pending  -Endocrine on board    ID  #influenza  - flu A positive    - c/w tamiflu  - trend WBC, fu cultures, trend fever curve  - will cover empirically with zosyn  -d/c Zithromax, legionella negative  -Blood cx and urine cx pending  -Pt with Otitis Externa, c/w Cipro, ENT consulted    HEME  #Leukocytosis  - likely reactive iso DKA +/- flu infection  - empiric abx as above,      PPX  #Heparin SQ    For Followup:  - Endocrinology recs  - ENT             Lyla Gomez MD  [id number]

## 2024-12-20 NOTE — DIETITIAN INITIAL EVALUATION ADULT - PERTINENT MEDS FT
MEDICATIONS  (STANDING):  chlorhexidine 2% Cloths 1 Application(s) Topical <User Schedule>  ciprofloxacin  0.3% Ophthalmic Solution for Otic Use 5 Drop(s) Left Ear two times a day  dextrose 5% + lactated ringers 1000 milliLiter(s) (100 mL/Hr) IV Continuous <Continuous>  fluticasone propionate 50 MICROgram(s)/spray Nasal Spray 1 Spray(s) Both Nostrils two times a day  heparin   Injectable 5000 Unit(s) SubCutaneous every 8 hours  insulin regular Infusion 3 Unit(s)/Hr (3 mL/Hr) IV Continuous <Continuous>  oseltamivir Suspension 75 milliGRAM(s) Oral two times a day  piperacillin/tazobactam IVPB.. 3.375 Gram(s) IV Intermittent every 8 hours  potassium chloride   Solution 40 milliEquivalent(s) Oral every 4 hours    MEDICATIONS  (PRN):  acetaminophen   Oral Liquid .. 650 milliGRAM(s) Oral every 6 hours PRN Temp greater or equal to 38C (100.4F)

## 2024-12-20 NOTE — DIETITIAN INITIAL EVALUATION ADULT - OTHER INFO
-NPO iso DKA/insulin gtt. unclear if adherent to insulin last few days while sick per team.   -blood glucose being monitored and decreasing on insulin   -hyponatremic   -dextrose 5% with LR with potassium chloride additive noted

## 2024-12-20 NOTE — DIETITIAN INITIAL EVALUATION ADULT - PERTINENT LABORATORY DATA
12-20    134[L]  |  106  |  <4[L]  ----------------------------<  202[H]  3.9   |  17[L]  |  <0.30[L]    Ca    6.5[LL]      20 Dec 2024 04:17  Phos  2.7     12-20  Mg     1.9     12-20    TPro  5.2[L]  /  Alb  2.4[L]  /  TBili  0.2  /  DBili  x   /  AST  11  /  ALT  7[L]  /  AlkPhos  79  12-20  POCT Blood Glucose.: 164 mg/dL (12-20-24 @ 08:15)

## 2024-12-20 NOTE — CHART NOTE - NSCHARTNOTEFT_GEN_A_CORE
MICU Transfer Note    Transfer from: MICU    Transfer to: ( x ) Medicine    (  ) Telemetry     (   ) RCU        (    ) Palliative         (   ) Stroke Unit          (   ) __________________    Accepting physican:      Patient with hx of type 1 DM on 70/30 who presents  with 5 days of cough/fatigue/nausea and found to have flu A positive and labs c/w DKA,  pH <6.90, HCO3 = 3.0, BHB >8, glucosuria, ketonuria, initial serum glucose 379, s/p 2L bolus in ED and started on insulin drip. Pt also with leukocytosis, fever, cough- treating empirically for superimposed bacterial PNA.  Patient admitted to MICU for further management of DKA.  PLAN:    NEURO  #No active issues  -A&O x 3, more awake today  -c/o some pain in the left ear, pt with Otitis Externa  -Activity as tolerated    CV  #no active issues  -Hemodynamically Stable  -Lipid profile pending    RESPIRATORY  -No active issues  -on RA 02sat>95%    RENAL  #HAGMA  - ph <6.90 x2; bicarb 3 in setting of DKA  - improvement with fluid resuscitation and insulin gtt  - s/p bicarb gtt @ 100cc/hr  - Electrolyte replacement    GI  #Diet  - Carb consistent diet    ENDOCRINE  #DKA  #Type 1 Diabetes mellitus  - home regimen 70/30: 30u am 20u pm  - seemingly iso influenza infxn, unclear if adherent to insulin last few days while sick  - in ED pH <6.90, BHB >8, glucosuria, ketonuria, initial serum glucose 379  - s/p insulin gtt  -Transitioned today with Lantus 28units, admelog 6units premeal, insulin sliding scale premeal and bedtime  -A1c pending  -TSH pending  -Endocrine on board    ID  #influenza  - flu A positive    - c/w tamiflu  - trend WBC, fu cultures, trend fever curve  - will cover empirically with zosyn  -d/c Zithromax, legionella negative  -Blood cx and urine cx pending  -Pt with Otitis Externa, c/w Cipro, ENT consulted    HEME  #Leukocytosis  - likely reactive iso DKA +/- flu infection  - empiric abx as above,      PPX  #Heparin SQ    For Followup:  - Endocrinology recs  - ENT       Vital Signs Last 24 Hrs  T(C): 36.7 (20 Dec 2024 08:00), Max: 38.6 (19 Dec 2024 12:00)  T(F): 98 (20 Dec 2024 08:00), Max: 101.5 (19 Dec 2024 12:00)  HR: 103 (20 Dec 2024 11:00) (97 - 122)  BP: 99/58 (20 Dec 2024 10:00) (91/55 - 111/60)  BP(mean): 73 (20 Dec 2024 10:00) (65 - 79)  RR: 22 (20 Dec 2024 11:00) (12 - 35)  SpO2: 98% (20 Dec 2024 11:00) (96% - 100%)    Parameters below as of 20 Dec 2024 08:00  Patient On (Oxygen Delivery Method): room air      I&O's Summary    19 Dec 2024 07:01  -  20 Dec 2024 07:00  --------------------------------------------------------  IN: 5524.4 mL / OUT: 5590 mL / NET: -65.6 mL    20 Dec 2024 07:01  -  20 Dec 2024 11:47  --------------------------------------------------------  IN: 744.2 mL / OUT: 875 mL / NET: -130.8 mL        MEDICATIONS  (STANDING):  chlorhexidine 2% Cloths 1 Application(s) Topical <User Schedule>  ciprofloxacin  0.3% Ophthalmic Solution for Otic Use 5 Drop(s) Left Ear two times a day  dextrose 5% + lactated ringers 1000 milliLiter(s) (100 mL/Hr) IV Continuous <Continuous>  fluticasone propionate 50 MICROgram(s)/spray Nasal Spray 1 Spray(s) Both Nostrils two times a day  heparin   Injectable 5000 Unit(s) SubCutaneous every 8 hours  insulin glargine Injectable (LANTUS) 28 Unit(s) SubCutaneous every morning  insulin lispro (ADMELOG) corrective regimen sliding scale   SubCutaneous three times a day before meals  insulin lispro (ADMELOG) corrective regimen sliding scale   SubCutaneous at bedtime  insulin lispro Injectable (ADMELOG) 6 Unit(s) SubCutaneous three times a day before meals  insulin regular Infusion 3 Unit(s)/Hr (3 mL/Hr) IV Continuous <Continuous>  oseltamivir Suspension 75 milliGRAM(s) Oral two times a day  piperacillin/tazobactam IVPB.. 3.375 Gram(s) IV Intermittent every 8 hours  potassium phosphate / sodium phosphate Tablet (K-PHOS No. 2) 1 Tablet(s) Oral every 6 hours    MEDICATIONS  (PRN):  acetaminophen   Oral Liquid .. 650 milliGRAM(s) Oral every 6 hours PRN Temp greater or equal to 38C (100.4F)        LABS                                            8.4                   Neurophils% (auto):   79.2   (12-20 @ 00:32):    10.13)-----------(223          Lymphocytes% (auto):  11.9                                          24.1                   Eosinphils% (auto):   0.0      Manual%: Neutrophils x    ; Lymphocytes x    ; Eosinophils x    ; Bands%: x    ; Blasts x                                    135    |  107    |  <4                  Calcium: 7.0   / iCa: x      (12-20 @ 08:32)    ----------------------------<  164       Magnesium: 2.6                              4.2     |  18     |  <0.30            Phosphorous: 2.2      TPro  5.3    /  Alb  2.5    /  TBili  0.2    /  DBili  x      /  AST  12     /  ALT  8      /  AlkPhos  79     20 Dec 2024 08:32    ( 12-20 @ 00:32 )   PT: 12.8 sec;   INR: 1.11 ratio  aPTT: 25.8 sec MICU Transfer Note    Transfer from: MICU    Transfer to: ( x ) Medicine    (  ) Telemetry     (   ) RCU        (    ) Palliative         (   ) Stroke Unit          (   ) __________________    Accepting physican:       Patient with hx of type 1 DM on 70/30 who presents  with 5 days of cough/fatigue/nausea and found to have flu A positive and labs c/w DKA,  pH <6.90, HCO3 = 3.0, BHB >8, glucosuria, ketonuria, initial serum glucose 379, s/p 2L bolus in ED and started on insulin drip. Pt also with leukocytosis, fever, cough- treating empirically for superimposed bacterial PNA.  Patient admitted to MICU for further management of DKA.  PLAN:    NEURO  #No active issues  -A&O x 3, more awake today  -c/o some pain in the left ear, pt with Otitis Externa  -Activity as tolerated    CV  #no active issues  -Hemodynamically Stable  -Lipid profile pending    RESPIRATORY  -No active issues  -on RA 02sat>95%    RENAL  #HAGMA  - ph <6.90 x2; bicarb 3 in setting of DKA  - improvement with fluid resuscitation and insulin gtt  - s/p bicarb gtt @ 100cc/hr  - Electrolyte replacement    GI  #Diet  - Carb consistent diet    ENDOCRINE  #DKA  #Type 1 Diabetes mellitus  - home regimen 70/30: 30u am 20u pm  - seemingly iso influenza infxn, unclear if adherent to insulin last few days while sick  - in ED pH <6.90, BHB >8, glucosuria, ketonuria, initial serum glucose 379  - s/p insulin gtt  -Transitioned today with Lantus 28units, admelog 6units premeal, insulin sliding scale premeal and bedtime  -A1c pending  -TSH pending  -Endocrine on board    ID  #influenza  - flu A positive    - c/w tamiflu  - trend WBC, fu cultures, trend fever curve  - will cover empirically with zosyn  -d/c Zithromax, legionella negative  -Blood cx and urine cx pending  -Pt with Otitis Externa, c/w Cipro, ENT consulted    HEME  #Leukocytosis  - likely reactive iso DKA +/- flu infection  - empiric abx as above,      PPX  #Heparin SQ    For Followup:  - Endocrinology recs  - ENT       Vital Signs Last 24 Hrs  T(C): 36.7 (20 Dec 2024 08:00), Max: 38.6 (19 Dec 2024 12:00)  T(F): 98 (20 Dec 2024 08:00), Max: 101.5 (19 Dec 2024 12:00)  HR: 103 (20 Dec 2024 11:00) (97 - 122)  BP: 99/58 (20 Dec 2024 10:00) (91/55 - 111/60)  BP(mean): 73 (20 Dec 2024 10:00) (65 - 79)  RR: 22 (20 Dec 2024 11:00) (12 - 35)  SpO2: 98% (20 Dec 2024 11:00) (96% - 100%)    Parameters below as of 20 Dec 2024 08:00  Patient On (Oxygen Delivery Method): room air      I&O's Summary    19 Dec 2024 07:01  -  20 Dec 2024 07:00  --------------------------------------------------------  IN: 5524.4 mL / OUT: 5590 mL / NET: -65.6 mL    20 Dec 2024 07:01  -  20 Dec 2024 11:47  --------------------------------------------------------  IN: 744.2 mL / OUT: 875 mL / NET: -130.8 mL        MEDICATIONS  (STANDING):  chlorhexidine 2% Cloths 1 Application(s) Topical <User Schedule>  ciprofloxacin  0.3% Ophthalmic Solution for Otic Use 5 Drop(s) Left Ear two times a day  dextrose 5% + lactated ringers 1000 milliLiter(s) (100 mL/Hr) IV Continuous <Continuous>  fluticasone propionate 50 MICROgram(s)/spray Nasal Spray 1 Spray(s) Both Nostrils two times a day  heparin   Injectable 5000 Unit(s) SubCutaneous every 8 hours  insulin glargine Injectable (LANTUS) 28 Unit(s) SubCutaneous every morning  insulin lispro (ADMELOG) corrective regimen sliding scale   SubCutaneous three times a day before meals  insulin lispro (ADMELOG) corrective regimen sliding scale   SubCutaneous at bedtime  insulin lispro Injectable (ADMELOG) 6 Unit(s) SubCutaneous three times a day before meals  insulin regular Infusion 3 Unit(s)/Hr (3 mL/Hr) IV Continuous <Continuous>  oseltamivir Suspension 75 milliGRAM(s) Oral two times a day  piperacillin/tazobactam IVPB.. 3.375 Gram(s) IV Intermittent every 8 hours  potassium phosphate / sodium phosphate Tablet (K-PHOS No. 2) 1 Tablet(s) Oral every 6 hours    MEDICATIONS  (PRN):  acetaminophen   Oral Liquid .. 650 milliGRAM(s) Oral every 6 hours PRN Temp greater or equal to 38C (100.4F)        LABS                                            8.4                   Neurophils% (auto):   79.2   (12-20 @ 00:32):    10.13)-----------(223          Lymphocytes% (auto):  11.9                                          24.1                   Eosinphils% (auto):   0.0      Manual%: Neutrophils x    ; Lymphocytes x    ; Eosinophils x    ; Bands%: x    ; Blasts x                                    135    |  107    |  <4                  Calcium: 7.0   / iCa: x      (12-20 @ 08:32)    ----------------------------<  164       Magnesium: 2.6                              4.2     |  18     |  <0.30            Phosphorous: 2.2      TPro  5.3    /  Alb  2.5    /  TBili  0.2    /  DBili  x      /  AST  12     /  ALT  8      /  AlkPhos  79     20 Dec 2024 08:32    ( 12-20 @ 00:32 )   PT: 12.8 sec;   INR: 1.11 ratio  aPTT: 25.8 sec

## 2024-12-20 NOTE — DIETITIAN INITIAL EVALUATION ADULT - WEIGHT USED FOR CALCULATIONS
Discharged patient; I had mistakenly though that stress test negative. Discussed with Cardiologist shortly after discharge and it appears that it is positive. Would plan on [facility] tomorrow. I called patient and communicated this error to her and with house supervisor. She will return to the hospital.    I will initiate on ASA, statin. Ideally would give plavix load but patient reports severe allergy to plavix in the past with fever and red rash across chest causing her to stop medication. Discussing safety of ticagrelor with pharmacy. Use heparin gtt if symptomatic.    Harsha Puckett MD  Hospital Medicine  Pager: [phone]  
dosing weight

## 2024-12-21 ENCOUNTER — TRANSCRIPTION ENCOUNTER (OUTPATIENT)
Age: 34
End: 2024-12-21

## 2024-12-21 DIAGNOSIS — J10.00 INFLUENZA DUE TO OTHER IDENTIFIED INFLUENZA VIRUS WITH UNSPECIFIED TYPE OF PNEUMONIA: ICD-10-CM

## 2024-12-21 DIAGNOSIS — E11.10 TYPE 2 DIABETES MELLITUS WITH KETOACIDOSIS WITHOUT COMA: ICD-10-CM

## 2024-12-21 DIAGNOSIS — H66.90 OTITIS MEDIA, UNSPECIFIED, UNSPECIFIED EAR: ICD-10-CM

## 2024-12-21 LAB
ALBUMIN SERPL ELPH-MCNC: 3.1 G/DL — LOW (ref 3.3–5)
ALP SERPL-CCNC: 107 U/L — SIGNIFICANT CHANGE UP (ref 40–120)
ALT FLD-CCNC: 7 U/L — LOW (ref 10–45)
ANION GAP SERPL CALC-SCNC: 12 MMOL/L — SIGNIFICANT CHANGE UP (ref 5–17)
AST SERPL-CCNC: 11 U/L — SIGNIFICANT CHANGE UP (ref 10–40)
BASOPHILS # BLD AUTO: 0.02 K/UL — SIGNIFICANT CHANGE UP (ref 0–0.2)
BASOPHILS NFR BLD AUTO: 0.4 % — SIGNIFICANT CHANGE UP (ref 0–2)
BILIRUB SERPL-MCNC: 0.4 MG/DL — SIGNIFICANT CHANGE UP (ref 0.2–1.2)
BUN SERPL-MCNC: 6 MG/DL — LOW (ref 7–23)
CALCIUM SERPL-MCNC: 9 MG/DL — SIGNIFICANT CHANGE UP (ref 8.4–10.5)
CHLORIDE SERPL-SCNC: 99 MMOL/L — SIGNIFICANT CHANGE UP (ref 96–108)
CO2 SERPL-SCNC: 22 MMOL/L — SIGNIFICANT CHANGE UP (ref 22–31)
CREAT SERPL-MCNC: <0.3 MG/DL — LOW (ref 0.5–1.3)
EGFR: 143 ML/MIN/1.73M2 — SIGNIFICANT CHANGE UP
EOSINOPHIL # BLD AUTO: 0.02 K/UL — SIGNIFICANT CHANGE UP (ref 0–0.5)
EOSINOPHIL NFR BLD AUTO: 0.4 % — SIGNIFICANT CHANGE UP (ref 0–6)
GLUCOSE BLDC GLUCOMTR-MCNC: 153 MG/DL — HIGH (ref 70–99)
GLUCOSE BLDC GLUCOMTR-MCNC: 157 MG/DL — HIGH (ref 70–99)
GLUCOSE BLDC GLUCOMTR-MCNC: 176 MG/DL — HIGH (ref 70–99)
GLUCOSE BLDC GLUCOMTR-MCNC: 208 MG/DL — HIGH (ref 70–99)
GLUCOSE BLDC GLUCOMTR-MCNC: 240 MG/DL — HIGH (ref 70–99)
GLUCOSE SERPL-MCNC: 164 MG/DL — HIGH (ref 70–99)
HCT VFR BLD CALC: 29.4 % — LOW (ref 34.5–45)
HGB BLD-MCNC: 9.9 G/DL — LOW (ref 11.5–15.5)
IMM GRANULOCYTES NFR BLD AUTO: 0.4 % — SIGNIFICANT CHANGE UP (ref 0–0.9)
LYMPHOCYTES # BLD AUTO: 1.73 K/UL — SIGNIFICANT CHANGE UP (ref 1–3.3)
LYMPHOCYTES # BLD AUTO: 37.9 % — SIGNIFICANT CHANGE UP (ref 13–44)
MAGNESIUM SERPL-MCNC: 2.3 MG/DL — SIGNIFICANT CHANGE UP (ref 1.6–2.6)
MCHC RBC-ENTMCNC: 28.7 PG — SIGNIFICANT CHANGE UP (ref 27–34)
MCHC RBC-ENTMCNC: 33.7 G/DL — SIGNIFICANT CHANGE UP (ref 32–36)
MCV RBC AUTO: 85.2 FL — SIGNIFICANT CHANGE UP (ref 80–100)
MONOCYTES # BLD AUTO: 0.57 K/UL — SIGNIFICANT CHANGE UP (ref 0–0.9)
MONOCYTES NFR BLD AUTO: 12.5 % — SIGNIFICANT CHANGE UP (ref 2–14)
NEUTROPHILS # BLD AUTO: 2.2 K/UL — SIGNIFICANT CHANGE UP (ref 1.8–7.4)
NEUTROPHILS NFR BLD AUTO: 48.4 % — SIGNIFICANT CHANGE UP (ref 43–77)
NRBC # BLD: 0 /100 WBCS — SIGNIFICANT CHANGE UP (ref 0–0)
PHOSPHATE SERPL-MCNC: 3.4 MG/DL — SIGNIFICANT CHANGE UP (ref 2.5–4.5)
PLATELET # BLD AUTO: 300 K/UL — SIGNIFICANT CHANGE UP (ref 150–400)
POTASSIUM SERPL-MCNC: 3.8 MMOL/L — SIGNIFICANT CHANGE UP (ref 3.5–5.3)
POTASSIUM SERPL-SCNC: 3.8 MMOL/L — SIGNIFICANT CHANGE UP (ref 3.5–5.3)
PROT SERPL-MCNC: 6.7 G/DL — SIGNIFICANT CHANGE UP (ref 6–8.3)
RBC # BLD: 3.45 M/UL — LOW (ref 3.8–5.2)
RBC # FLD: 14.2 % — SIGNIFICANT CHANGE UP (ref 10.3–14.5)
SODIUM SERPL-SCNC: 133 MMOL/L — LOW (ref 135–145)
WBC # BLD: 4.56 K/UL — SIGNIFICANT CHANGE UP (ref 3.8–10.5)
WBC # FLD AUTO: 4.56 K/UL — SIGNIFICANT CHANGE UP (ref 3.8–10.5)

## 2024-12-21 PROCEDURE — 99232 SBSQ HOSP IP/OBS MODERATE 35: CPT

## 2024-12-21 RX ORDER — ACETAMINOPHEN 80 MG/.8ML
650 SOLUTION/ DROPS ORAL EVERY 6 HOURS
Refills: 0 | Status: DISCONTINUED | OUTPATIENT
Start: 2024-12-21 | End: 2024-12-22

## 2024-12-21 RX ORDER — GUAIFENESIN 100 MG/5ML
1200 SYRUP ORAL EVERY 12 HOURS
Refills: 0 | Status: DISCONTINUED | OUTPATIENT
Start: 2024-12-21 | End: 2024-12-21

## 2024-12-21 RX ORDER — GUAIFENESIN 100 MG/5ML
200 SYRUP ORAL EVERY 6 HOURS
Refills: 0 | Status: DISCONTINUED | OUTPATIENT
Start: 2024-12-21 | End: 2024-12-22

## 2024-12-21 RX ORDER — ONDANSETRON 4 MG/1
4 TABLET ORAL ONCE
Refills: 0 | Status: DISCONTINUED | OUTPATIENT
Start: 2024-12-21 | End: 2024-12-22

## 2024-12-21 RX ORDER — INSULIN GLARGINE-YFGN 100 [IU]/ML
30 INJECTION, SOLUTION SUBCUTANEOUS EVERY MORNING
Refills: 0 | Status: DISCONTINUED | OUTPATIENT
Start: 2024-12-22 | End: 2024-12-22

## 2024-12-21 RX ADMIN — ACETAMINOPHEN 650 MILLIGRAM(S): 80 SOLUTION/ DROPS ORAL at 22:44

## 2024-12-21 RX ADMIN — Medication 1 TABLET(S): at 06:18

## 2024-12-21 RX ADMIN — Medication 6 UNIT(S): at 08:42

## 2024-12-21 RX ADMIN — OSELTAMIVIR 75 MILLIGRAM(S): 75 CAPSULE ORAL at 22:17

## 2024-12-21 RX ADMIN — FLUTICASONE PROPIONATE 1 SPRAY(S): 50 SPRAY, METERED NASAL at 17:32

## 2024-12-21 RX ADMIN — Medication 1 TABLET(S): at 17:29

## 2024-12-21 RX ADMIN — Medication 1: at 13:03

## 2024-12-21 RX ADMIN — PIPERACILLIN AND TAZOBACTAM 25 GRAM(S): 3; .375 INJECTION, POWDER, LYOPHILIZED, FOR SOLUTION INTRAVENOUS at 15:04

## 2024-12-21 RX ADMIN — ACETAMINOPHEN 650 MILLIGRAM(S): 80 SOLUTION/ DROPS ORAL at 23:44

## 2024-12-21 RX ADMIN — CIPROFLOXACIN 5 DROP(S): 3 SOLUTION OPHTHALMIC at 06:29

## 2024-12-21 RX ADMIN — Medication 1 TABLET(S): at 11:33

## 2024-12-21 RX ADMIN — HEPARIN SODIUM 5000 UNIT(S): 1000 INJECTION, SOLUTION INTRAVENOUS; SUBCUTANEOUS at 06:29

## 2024-12-21 RX ADMIN — PIPERACILLIN AND TAZOBACTAM 25 GRAM(S): 3; .375 INJECTION, POWDER, LYOPHILIZED, FOR SOLUTION INTRAVENOUS at 22:21

## 2024-12-21 RX ADMIN — CIPROFLOXACIN 5 DROP(S): 3 SOLUTION OPHTHALMIC at 17:32

## 2024-12-21 RX ADMIN — Medication 2: at 08:43

## 2024-12-21 RX ADMIN — Medication 6 UNIT(S): at 13:03

## 2024-12-21 RX ADMIN — FLUTICASONE PROPIONATE 1 SPRAY(S): 50 SPRAY, METERED NASAL at 06:30

## 2024-12-21 RX ADMIN — PIPERACILLIN AND TAZOBACTAM 25 GRAM(S): 3; .375 INJECTION, POWDER, LYOPHILIZED, FOR SOLUTION INTRAVENOUS at 06:36

## 2024-12-21 RX ADMIN — Medication 6 UNIT(S): at 17:30

## 2024-12-21 RX ADMIN — HEPARIN SODIUM 5000 UNIT(S): 1000 INJECTION, SOLUTION INTRAVENOUS; SUBCUTANEOUS at 22:17

## 2024-12-21 RX ADMIN — HEPARIN SODIUM 5000 UNIT(S): 1000 INJECTION, SOLUTION INTRAVENOUS; SUBCUTANEOUS at 13:03

## 2024-12-21 RX ADMIN — Medication 1200 MILLIGRAM(S): at 17:30

## 2024-12-21 RX ADMIN — Medication 1: at 17:30

## 2024-12-21 RX ADMIN — Medication 1 TABLET(S): at 23:35

## 2024-12-21 RX ADMIN — Medication 1 TABLET(S): at 06:29

## 2024-12-21 RX ADMIN — OSELTAMIVIR 75 MILLIGRAM(S): 75 CAPSULE ORAL at 11:30

## 2024-12-21 RX ADMIN — INSULIN GLARGINE-YFGN 28 UNIT(S): 100 INJECTION, SOLUTION SUBCUTANEOUS at 09:16

## 2024-12-21 NOTE — DISCHARGE NOTE PROVIDER - NSDCMRMEDTOKEN_GEN_ALL_CORE_FT
NovoLOG Mix 70/30 FlexPen subcutaneous suspension: 30 unit(s) subcutaneous once a day (in the morning)  NovoLOG Mix 70/30 FlexPen subcutaneous suspension: 20 unit(s) subcutaneous once a day (in the evening)   ciprofloxacin 0.2% otic solution: 5 drop(s) in the left ear 2 times a day  fluticasone 50 mcg/inh nasal spray: 1 spray(s) nasal 2 times a day  guaiFENesin 100 mg/5 mL oral liquid: 10 milliliter(s) orally every 6 hours As needed Cough  NovoLOG Mix 70/30 FlexPen subcutaneous suspension: 30 unit(s) subcutaneous once a day (in the morning)  NovoLOG Mix 70/30 FlexPen subcutaneous suspension: 20 unit(s) subcutaneous once a day (in the evening)   Admelog SoloStar 100 units/mL injectable solution: 7 unit(s) injectable 3 times a day (before meals)  alcohol swabs: Apply topically to affected area 4 times a day  ciprofloxacin 0.2% otic solution: 5 drop(s) in the left ear 2 times a day  Dexcom G7 : Use as directed  Dexcom G7 Sensors: Change every 10 days  fluticasone 50 mcg/inh nasal spray: 1 spray(s) nasal 2 times a day  glucometer (per patient&#x27;s insurance): Test blood sugars four times a day. Dispense #1 glucometer.  guaiFENesin 100 mg/5 mL oral liquid: 10 milliliter(s) orally every 6 hours As needed Cough  Insulin Pen Needles, 4mm: 1 application subcutaneously 4 times a day. ** Use with insulin pen **  lancets: 1 application subcutaneously 4 times a day  Lantus Solostar Pen 100 units/mL subcutaneous solution: 32 unit(s) subcutaneous once a day (in the morning)   ciprofloxacin 0.2% otic solution: 5 drop(s) in the left ear 2 times a day  Dexcom G7 : Use as directed  Dexcom G7 Sensors: Change every 10 days  fluticasone 50 mcg/inh nasal spray: 1 spray(s) nasal 2 times a day  glucometer (per patient&#x27;s insurance): Test blood sugars four times a day. Dispense #1 glucometer.  guaiFENesin 100 mg/5 mL oral liquid: 10 milliliter(s) orally every 6 hours As needed Cough  HumaLOG KwikPen 100 units/mL injectable solution: 7 unit(s) injectable 3 times a day (with meals)  Insulin Pen Needles, 4mm: 1 application subcutaneously 4 times a day. ** Use with insulin pen **  lancets: 1 application subcutaneously 4 times a day  Lantus Solostar Pen 100 units/mL subcutaneous solution: 32 unit(s) subcutaneous once a day (in the morning)  test strips (per patient&#x27;s insurance): 1 application subcutaneously 4 times a day. ** Compatible with patient&#x27;s glucometer **

## 2024-12-21 NOTE — DISCHARGE NOTE PROVIDER - DID THE PATIENT PRESENT WITH OR WAS TREATED FOR MALNUTRITION DURING THIS ADMISSION
[facility]    ED Boarding Nurse Handoff Addendum Report:    Date/time: 5/21/2024, 2:00 PM    Activity Level: in bed and lift    Fall Risk: Yes:  patient and family education, activity supervised, and room door open    Active Infusions: IVF @ 75cc/hr    Current Meds Due: See MAR    Current care needs: Incontinent checks, repositioning    Oxygen requirements (liters/min and/or FiO2): None    Respiratory status: Room air    Vital signs (within last 30 minutes):    Vitals:    05/21/24 0726 05/21/24 0729 05/21/24 0918 05/21/24 1135   BP: (!) 159/100   112/82   BP Location: Left arm   Left arm   Pulse:  102  91   Resp: 20   14   Temp: 99.8  F (37.7  C)   98.7  F (37.1  C)   TempSrc: Axillary   Axillary   SpO2: 97%  97% 94%   Weight:           Focused assessment:    Able to make eye contact, answers some questions. Family reports baseline is very talkative. LS clear. Loose cough noted. Incontinent of urine. Repositioning needed. Total feed full liquids - so far tolerated apple sauce and sips of water. Mech lift and WC bound at baseline per family report.     ED Boarding Nurse name: Janeth Fonseca RN     Yes...

## 2024-12-21 NOTE — DISCHARGE NOTE PROVIDER - CARE PROVIDERS DIRECT ADDRESSES
[email or web address]

## 2024-12-21 NOTE — DISCHARGE NOTE PROVIDER - NSDCCPTREATMENT_GEN_ALL_CORE_FT
PRINCIPAL PROCEDURE  Procedure: CT chest w con  Findings and Treatment: FINDINGS:  LOWER CHEST: Patchy airspace opacities in bilateral lung bases with   predominantly peripheral subpleural distribution raise concern for   multifocal pneumonia.  LIVER: Within normal limits.  BILE DUCTS: Normal caliber.  GALLBLADDER: Within normal limits.  SPLEEN: Within normal limits.  PANCREAS: Homogeneous parenchymal enhancement of the pancreas. No   pancreatic ductal dilatation. No pancreatic or peripancreatic edema or   fluid collections.  ADRENALS: Within normal limits.  KIDNEYS/URETERS: Heterogeneous enhancement of the right kidney with   wedge-shaped areas of focal hypoattenuation and hypoenhancement in the   upper pole and interpolar region, some with capsular retraction. Grossly   homogeneous enhancement of the left kidney. Fullness of the renal pelvis   and ureters.  BLADDER: Market distention of the urinary bladder reaching the level of   the umbilicus with a focus of intraluminal air.  REPRODUCTIVE ORGANS: Uterus and adnexa within normal limits.  BOWEL: No bowel obstruction. Surgical material at the cecal pole suggest   appendectomy. Large colon stool burden as can be seen with constipation.   Redundant sigmoid colon.  PERITONEUM/RETROPERITONEUM: No ascites, pneumoperitoneum or loculated   fluid collections.  VESSELS: Within normal limits.  LYMPH NODES: No lymphadenopathy.  ABDOMINAL WALL: Within normal limits.  BONES: Mild discogenic degenerative changes of the spine.  IMPRESSION:  No acute pancreatitis.  Severe distention of the urinary bladder raising concern for urinary   retention, possibly due to outlet obstruction or neurogenic bladder. Tiny   focus of air in the bladder may relate to infection or instrumentation.   Correlate with patient history.  Wedge-shaped areas of hypoattenuation/hypoenhancement of the right kidney   with capsular retraction may be pyelonephritis or sequela thereof, less   likely infarcts. Correlate with urinalysis.  Bilateral lung bases patchy airspace opacities with peripheral   distribution raise concern for multifocal pneumonia. The pattern

## 2024-12-21 NOTE — DISCHARGE NOTE PROVIDER - DATE OF DISCHARGE SERVICE:
22-Dec-2024 Pt with cardiac arrest noted and otherwise will admit for further care with ICU. maintained on epi drip and noted CT head changes consistent of post arrest white matter changes related to hypoxia.

## 2024-12-21 NOTE — PROGRESS NOTE ADULT - NUTRITIONAL ASSESSMENT
This patient has been assessed with a concern for Malnutrition and has been determined to have a diagnosis/diagnoses of Moderate protein-calorie malnutrition.    This patient is being managed with:   Diet Consistent Carbohydrate w/Evening Snack-  Entered: Dec 20 2024  9:34AM

## 2024-12-21 NOTE — DISCHARGE NOTE PROVIDER - NSDCFUADDAPPT_GEN_ALL_CORE_FT
APPTS ARE READY TO BE MADE: [X ] YES    Best Family or Patient Contact (if needed):    Additional Information about above appointments (if needed):    1: Endocrinology  2:   3:     Other comments or requests:    APPTS ARE READY TO BE MADE: [X ] YES    Best Family or Patient Contact (if needed):    Additional Information about above appointments (if needed):    1: Endocrinology  2:   3:     Other comments or requests:       Patient informed us they already have secured a follow up appointment which was not scheduled by our team.  no details provided however request our IPR number in case needs help booking other appointments.

## 2024-12-21 NOTE — DISCHARGE NOTE PROVIDER - NSDCCPCAREPLAN_GEN_ALL_CORE_FT
PRINCIPAL DISCHARGE DIAGNOSIS  Diagnosis: DKA (diabetic ketoacidosis)  Assessment and Plan of Treatment: HgA1C this admission was 15.5.  Make sure you get your HgA1c checked every three months.  If you take insulin, check your blood glucose before meals and at bedtime.  It's important not to skip any meals.  Keep a log of your blood glucose results and always take it with you to your doctor appointments.  Keep a list of your current medications including injectables and over the counter medications and bring this medication list with you to all your doctor appointments.  If you have not seen your ophthalmologist this year call for appointment.  Check your feet daily for redness, sores, or openings. Do not self treat. If no improvement in two days call your primary care physician for an appointment.  Low blood sugar (hypoglycemia) is a blood sugar below 70mg/dl. Check your blood sugar if you feel signs/symptoms of hypoglycemia. If your blood sugar is below 70 take 15 grams of carbohydrates (ex 4 oz of apple juice, 3-4 glucose tablets, or 4-6 oz of regular soda) wait 15 minutes and repeat blood sugar to make sure it comes up above 70.  If your blood sugar is above 70 and you are due for a meal, have a meal.  If you are not due for a meal have a snack.  This snack helps keeps your blood sugar at a safe range.     PRINCIPAL DISCHARGE DIAGNOSIS  Diagnosis: DKA (diabetic ketoacidosis)  Assessment and Plan of Treatment: HgA1C this admission was 15.5. You were seen by endocrinology, please take your insulin as prescribed. Please take 32 units in the am and premeal Admelog 7 units  plus low correction scale before each meals.    Make sure you get your HgA1c checked every three months.  If you take insulin, check your blood glucose before meals and at bedtime.  It's important not to skip any meals.  Keep a log of your blood glucose results and always take it with you to your doctor appointments.  Keep a list of your current medications including injectables and over the counter medications and bring this medication list with you to all your doctor appointments.  If you have not seen your ophthalmologist this year call for appointment.  Check your feet daily for redness, sores, or openings. Do not self treat. If no improvement in two days call your primary care physician for an appointment.  Low blood sugar (hypoglycemia) is a blood sugar below 70mg/dl. Check your blood sugar if you feel signs/symptoms of hypoglycemia. If your blood sugar is below 70 take 15 grams of carbohydrates (ex 4 oz of apple juice, 3-4 glucose tablets, or 4-6 oz of regular soda) wait 15 minutes and repeat blood sugar to make sure it comes up above 70.  If your blood sugar is above 70 and you are due for a meal, have a meal.  If you are not due for a meal have a snack.  This snack helps keeps your blood sugar at a safe range.      SECONDARY DISCHARGE DIAGNOSES  Diagnosis: Pneumonia due to influenza A virus  Assessment and Plan of Treatment: You completed medication for influenza and superimposed pneumonia. Please continue supportive care with over the counter medications for cough

## 2024-12-21 NOTE — DISCHARGE NOTE PROVIDER - PROVIDER TOKENS
PROVIDER:[TOKEN:[[id number]]],FREE:[LAST:[ENT],PHONE:[phone]],FAX:[(   )    -],ADDRESS:[[address] [place], [place] [address]    Patient should follow up in ENT office as an outpatient. May see Dr. Metcalf, Dr. Rosas, Dr. Clancy, Dr. Banks. Call [phone].]]

## 2024-12-21 NOTE — PROGRESS NOTE ADULT - PROBLEM SELECTOR PLAN 3
- CT auditory canals concerning for otitis media  - continue ciprofloxacin ear drops  - ENT recs appreciated

## 2024-12-21 NOTE — DISCHARGE NOTE PROVIDER - HOSPITAL COURSE
Pt is a 35 year old woman with DM1 who presents for 4 days of cough, headache, and lethargy. On Saturday, she developed a cough and HA. Sunday, she presented to urgent care and was diagnosed with bronchitis and given albuterol. Since then she developed worsening lethargy until today when her mother brought her to the ED. Of note, she lives with her  who has the flu. In 2010, she had DKA brought on by stress which presented similarly. She takes her insulin regularly (70/30 30u am, 20u pm) but does not follow with any physicians. Denies HA. Endorses SOB, excessive thirst, +n, -v, -d. Endorses feeling feverish over this period.    ED Course: , /86, RR 23, 100%RA, pH <6.9, bicarb 10-->4, UA+ for glucose, ketones.  Flu A positive. Given 2L fluids, started on insulin gtt. Admitted to MICU for DKA management. (18 Dec 2024 19:12)    Hospital Course:  #DKA  #Type 1 Diabetes mellitus  - home regimen 70/30: 30u am 20u pm  - seemingly iso influenza infxn, unclear if adherent to insulin last few days while sick  - in ED pH <6.90, BHB >8, glucosuria, ketonuria, initial serum glucose 379  - s/p insulin gtt  -Transitioned today with Lantus 28units, admelog 6units premeal, insulin sliding scale premeal and bedtime  -A1c pending  -TSH pending  -Endocrine on board    ID  #influenza  - flu A positive    - c/w tamiflu  - trend WBC, fu cultures, trend fever curve  - will cover empirically with zosyn  -d/c Zithromax, legionella negative  -Blood cx and urine cx pending  -Pt with Otitis Externa, c/w Cipro, ENT consulted    HEME  #Leukocytosis  - likely reactive iso DKA +/- flu infection  - empiric abx as above,      PPX  #Heparin SQ    For Followup:  - Endocrinology recs  - ENT       Important Medication Changes and Reason:    Active or Pending Issues Requiring Follow-up:    Advanced Directives:   [X ] Full code  [ ] DNR  [ ] Hospice    Discharge Diagnoses:  DKA  FLU A       Pt is a 35 year old woman with DM1 who presents for 4 days of cough, headache, and lethargy. On Saturday, she developed a cough and HA. Sunday, she presented to urgent care and was diagnosed with bronchitis and given albuterol. Since then she developed worsening lethargy until today when her mother brought her to the ED. Of note, she lives with her  who has the flu. In 2010, she had DKA brought on by stress which presented similarly. She takes her insulin regularly (70/30 30u am, 20u pm) but does not follow with any physicians. Denies HA. Endorses SOB, excessive thirst, +n, -v, -d. Endorses feeling feverish over this period.    ED Course: , /86, RR 23, 100%RA, pH <6.9, bicarb 10-->4, UA+ for glucose, ketones.  Flu A positive. Given 2L fluids, started on insulin gtt. Admitted to MICU for DKA management. (18 Dec 2024 19:12)    Hospital Course:  #DKA  #Type 1 Diabetes mellitus  - home regimen 70/30: 30u am 20u pm  - seemingly iso influenza infxn, unclear if adherent to insulin last few days while sick  - in ED pH <6.90, BHB >8, glucosuria, ketonuria, initial serum glucose 379  - s/p insulin gtt  -Transitioned today with Lantus 28units, admelog 6units premeal, insulin sliding scale premeal and bedtime  -A1c pending  -TSH pending  -Endocrine on board    ID  #influenza  - flu A positive    - c/w tamiflu  - trend WBC, fu cultures, trend fever curve  - will cover empirically with zosyn  -d/c Zithromax, legionella negative  -Blood cx and urine cx pending  -Pt with Otitis Externa, c/w Cipro, ENT consulted    HEME  #Leukocytosis  - likely reactive iso DKA +/- flu infection  - empiric abx as above,      PPX  #Heparin SQ    For Followup:  - Endocrinology recs  - ENT       Important Medication Changes and Reason:  insulin adjusted  Active or Pending Issues Requiring Follow-up:  f/u with endo  Advanced Directives:   [X ] Full code  [ ] DNR  [ ] Hospice    Discharge Diagnoses:  DKA  FLU A

## 2024-12-21 NOTE — DISCHARGE NOTE PROVIDER - DETAILS OF MALNUTRITION DIAGNOSIS/DIAGNOSES
This patient has been assessed with a concern for Malnutrition and was treated during this hospitalization for the following Nutrition diagnosis/diagnoses:     -  12/20/2024: Moderate protein-calorie malnutrition

## 2024-12-21 NOTE — DISCHARGE NOTE PROVIDER - ATTENDING DISCHARGE PHYSICAL EXAMINATION:
PHYSICAL EXAM:    Vital Signs Last 24 Hrs  T(C): 36.6 (22 Dec 2024 10:33), Max: 36.8 (21 Dec 2024 15:30)  T(F): 97.9 (22 Dec 2024 10:33), Max: 98.3 (21 Dec 2024 19:30)  HR: 96 (22 Dec 2024 10:33) (89 - 96)  BP: 113/76 (22 Dec 2024 10:33) (107/74 - 118/71)  BP(mean): --  RR: 18 (22 Dec 2024 10:33) (18 - 20)  SpO2: 97% (22 Dec 2024 10:33) (96% - 100%)    General: No acute distress.  HEENT: No scleral icterus or injection  Neck: Supple.  Heart: RRR.  Normal S1 and S2.  No murmurs, rubs, or gallops.   Lungs: CTAB. No wheezes, crackles, or rhonchi.    Abdomen: BS+, soft, NT/ND  Skin: Warm and dry.  No rashes.  Extremities: No edema, clubbing, or cyanosis  Musculoskeletal: No deformities  Neuro: A&Ox3, moving all extremities

## 2024-12-21 NOTE — DISCHARGE NOTE PROVIDER - NSDCFUSCHEDAPPT_GEN_ALL_CORE_FT
Ileana Banks  [facility]  [facility] [address] [facility]  Scheduled Appointment: 12/26/2024

## 2024-12-21 NOTE — PROGRESS NOTE ADULT - PROBLEM SELECTOR PLAN 2
Influenza A positive with CT showing multifocal PNA  - continue tamiflu  - continue zosyn, plan for 5 days  - monitor O2 sats

## 2024-12-21 NOTE — DISCHARGE NOTE PROVIDER - CARE PROVIDER_API CALL
Suleiman Knight  Endocrinology/Metab/Diabetes  [address] [place] [address]  Phone: [phone]  Fax: [phone]  Follow Up Time:     ENT,   [address] [place] [address]    Patient should follow up in ENT office as an outpatient. May see Dr. Metcalf, Dr. Rosas, Dr. Clancy, Dr. Banks. Call [phone].  Phone: [phone]  Fax: (   )    -  Follow Up Time:

## 2024-12-21 NOTE — PROGRESS NOTE ADULT - PROBLEM SELECTOR PLAN 1
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 C-peptide 1.2 in 2016 (no glucose), KARLI and islet cell antibodies negative  - HgbA1c >15%  - Home regimen: novolog 70/30 insulin 40 units in the AM, 30 units in the PM. Ran out 1 month ago  - Increase Lantus to 30U in am, continue admelog 6U TID premeal  - monitor blood glucose closely  - endo recs greatly appreciated

## 2024-12-22 VITALS
RESPIRATION RATE: 18 BRPM | DIASTOLIC BLOOD PRESSURE: 82 MMHG | TEMPERATURE: 98 F | SYSTOLIC BLOOD PRESSURE: 116 MMHG | HEART RATE: 91 BPM | OXYGEN SATURATION: 99 %

## 2024-12-22 DIAGNOSIS — E11.65 TYPE 2 DIABETES MELLITUS WITH HYPERGLYCEMIA: ICD-10-CM

## 2024-12-22 LAB
GLUCOSE BLDC GLUCOMTR-MCNC: 217 MG/DL — HIGH (ref 70–99)
GLUCOSE BLDC GLUCOMTR-MCNC: 235 MG/DL — HIGH (ref 70–99)

## 2024-12-22 PROCEDURE — 74177 CT ABD & PELVIS W/CONTRAST: CPT | Mod: MC

## 2024-12-22 PROCEDURE — 84132 ASSAY OF SERUM POTASSIUM: CPT

## 2024-12-22 PROCEDURE — 99285 EMERGENCY DEPT VISIT HI MDM: CPT | Mod: 25

## 2024-12-22 PROCEDURE — 84681 ASSAY OF C-PEPTIDE: CPT

## 2024-12-22 PROCEDURE — 71045 X-RAY EXAM CHEST 1 VIEW: CPT

## 2024-12-22 PROCEDURE — 80053 COMPREHEN METABOLIC PANEL: CPT

## 2024-12-22 PROCEDURE — 87449 NOS EACH ORGANISM AG IA: CPT

## 2024-12-22 PROCEDURE — 85014 HEMATOCRIT: CPT

## 2024-12-22 PROCEDURE — 83540 ASSAY OF IRON: CPT

## 2024-12-22 PROCEDURE — 82803 BLOOD GASES ANY COMBINATION: CPT

## 2024-12-22 PROCEDURE — 84100 ASSAY OF PHOSPHORUS: CPT

## 2024-12-22 PROCEDURE — 87040 BLOOD CULTURE FOR BACTERIA: CPT

## 2024-12-22 PROCEDURE — 70481 CT ORBIT/EAR/FOSSA W/DYE: CPT | Mod: MC

## 2024-12-22 PROCEDURE — 80061 LIPID PANEL: CPT

## 2024-12-22 PROCEDURE — 83605 ASSAY OF LACTIC ACID: CPT

## 2024-12-22 PROCEDURE — 82962 GLUCOSE BLOOD TEST: CPT

## 2024-12-22 PROCEDURE — 83036 HEMOGLOBIN GLYCOSYLATED A1C: CPT

## 2024-12-22 PROCEDURE — 85045 AUTOMATED RETICULOCYTE COUNT: CPT

## 2024-12-22 PROCEDURE — 85610 PROTHROMBIN TIME: CPT

## 2024-12-22 PROCEDURE — 82728 ASSAY OF FERRITIN: CPT

## 2024-12-22 PROCEDURE — 99239 HOSP IP/OBS DSCHRG MGMT >30: CPT

## 2024-12-22 PROCEDURE — 99233 SBSQ HOSP IP/OBS HIGH 50: CPT

## 2024-12-22 PROCEDURE — 83690 ASSAY OF LIPASE: CPT

## 2024-12-22 PROCEDURE — 84145 PROCALCITONIN (PCT): CPT

## 2024-12-22 PROCEDURE — 0225U NFCT DS DNA&RNA 21 SARSCOV2: CPT

## 2024-12-22 PROCEDURE — 96374 THER/PROPH/DIAG INJ IV PUSH: CPT

## 2024-12-22 PROCEDURE — 87637 SARSCOV2&INF A&B&RSV AMP PRB: CPT

## 2024-12-22 PROCEDURE — 85730 THROMBOPLASTIN TIME PARTIAL: CPT

## 2024-12-22 PROCEDURE — 87086 URINE CULTURE/COLONY COUNT: CPT

## 2024-12-22 PROCEDURE — 85025 COMPLETE CBC W/AUTO DIFF WBC: CPT

## 2024-12-22 PROCEDURE — 83550 IRON BINDING TEST: CPT

## 2024-12-22 PROCEDURE — 86341 ISLET CELL ANTIBODY: CPT

## 2024-12-22 PROCEDURE — 81001 URINALYSIS AUTO W/SCOPE: CPT

## 2024-12-22 PROCEDURE — 85018 HEMOGLOBIN: CPT

## 2024-12-22 PROCEDURE — 84295 ASSAY OF SERUM SODIUM: CPT

## 2024-12-22 PROCEDURE — 84702 CHORIONIC GONADOTROPIN TEST: CPT

## 2024-12-22 PROCEDURE — 82010 KETONE BODYS QUAN: CPT

## 2024-12-22 PROCEDURE — 82330 ASSAY OF CALCIUM: CPT

## 2024-12-22 PROCEDURE — 82947 ASSAY GLUCOSE BLOOD QUANT: CPT

## 2024-12-22 PROCEDURE — 83735 ASSAY OF MAGNESIUM: CPT

## 2024-12-22 PROCEDURE — 94640 AIRWAY INHALATION TREATMENT: CPT

## 2024-12-22 PROCEDURE — 82435 ASSAY OF BLOOD CHLORIDE: CPT

## 2024-12-22 PROCEDURE — 84443 ASSAY THYROID STIM HORMONE: CPT

## 2024-12-22 RX ORDER — INSULIN ASPART 100 [IU]/ML
30 INJECTION, SUSPENSION SUBCUTANEOUS
Refills: 0 | DISCHARGE

## 2024-12-22 RX ORDER — INSULIN GLARGINE-YFGN 100 [IU]/ML
32 INJECTION, SOLUTION SUBCUTANEOUS
Qty: 1 | Refills: 0
Start: 2024-12-22 | End: 2025-01-20

## 2024-12-22 RX ORDER — INSULIN ASPART 100 [IU]/ML
20 INJECTION, SUSPENSION SUBCUTANEOUS
Refills: 0 | DISCHARGE

## 2024-12-22 RX ORDER — ISOPROPYL ALCOHOL, BENZOCAINE .7; .06 ML/ML; ML/ML
0 SWAB TOPICAL
Qty: 100 | Refills: 1
Start: 2024-12-22

## 2024-12-22 RX ORDER — FLUTICASONE PROPIONATE 50 UG/1
1 SPRAY, METERED NASAL
Qty: 1 | Refills: 0
Start: 2024-12-22 | End: 2024-12-28

## 2024-12-22 RX ORDER — INSULIN LISPRO 100/ML
7 VIAL (ML) SUBCUTANEOUS
Qty: 1 | Refills: 0
Start: 2024-12-22 | End: 2025-01-20

## 2024-12-22 RX ORDER — CIPROFLOXACIN 6 MG/ML
5 SUSPENSION INTRATYMPANIC
Qty: 1 | Refills: 0
Start: 2024-12-22 | End: 2024-12-28

## 2024-12-22 RX ORDER — GUAIFENESIN 100 MG/5ML
10 SYRUP ORAL
Qty: 0 | Refills: 0 | DISCHARGE
Start: 2024-12-22

## 2024-12-22 RX ADMIN — Medication 1 TABLET(S): at 12:02

## 2024-12-22 RX ADMIN — HEPARIN SODIUM 5000 UNIT(S): 1000 INJECTION, SOLUTION INTRAVENOUS; SUBCUTANEOUS at 06:11

## 2024-12-22 RX ADMIN — OSELTAMIVIR 75 MILLIGRAM(S): 75 CAPSULE ORAL at 12:03

## 2024-12-22 RX ADMIN — FLUTICASONE PROPIONATE 1 SPRAY(S): 50 SPRAY, METERED NASAL at 06:12

## 2024-12-22 RX ADMIN — Medication 2: at 12:18

## 2024-12-22 RX ADMIN — Medication 2: at 08:33

## 2024-12-22 RX ADMIN — PIPERACILLIN AND TAZOBACTAM 25 GRAM(S): 3; .375 INJECTION, POWDER, LYOPHILIZED, FOR SOLUTION INTRAVENOUS at 06:10

## 2024-12-22 RX ADMIN — Medication 200 MILLIGRAM(S): at 12:19

## 2024-12-22 RX ADMIN — CIPROFLOXACIN 5 DROP(S): 3 SOLUTION OPHTHALMIC at 06:10

## 2024-12-22 RX ADMIN — HEPARIN SODIUM 5000 UNIT(S): 1000 INJECTION, SOLUTION INTRAVENOUS; SUBCUTANEOUS at 14:07

## 2024-12-22 RX ADMIN — Medication 1 TABLET(S): at 06:11

## 2024-12-22 RX ADMIN — Medication 6 UNIT(S): at 08:33

## 2024-12-22 RX ADMIN — INSULIN GLARGINE-YFGN 30 UNIT(S): 100 INJECTION, SOLUTION SUBCUTANEOUS at 08:34

## 2024-12-22 RX ADMIN — Medication 200 MILLIGRAM(S): at 06:14

## 2024-12-22 RX ADMIN — Medication 6 UNIT(S): at 12:19

## 2024-12-22 NOTE — PROGRESS NOTE ADULT - SUBJECTIVE AND OBJECTIVE BOX
Patient is a 34y old  Female who presents with a chief complaint of DKA (18 Dec 2024 19:12)    ON Events: Still on insulin drip @ 6units/hr, Bicarb drip at 100cc, D5LR decreased to 100cc/hr    REVIEW OF SYSTEMS:  CONSTITUTIONAL: +weakness, fevers, chills, lethargy  EYES/ENT: No visual changes;  No vertigo or throat pain, + left ear pain  NECK: No pain or stiffness  RESPIRATORY: +cough,  +shortness of breath  CARDIOVASCULAR: No chest pain or palpitations  GASTROINTESTINAL: No abdominal or epigastric pain. +nausea/wretching no vomiting.  No diarrhea or constipation. No melena or hematochezia.  GENITOURINARY: No dysuria, frequency or hematuria  NEUROLOGICAL: No numbness or weakness  SKIN: No itching, rashes    OBJECTIVE:  ICU Vital Signs Last 24 Hrs  T(C): 37.4 (19 Dec 2024 04:00), Max: 37.4 (19 Dec 2024 04:00)  T(F): 99.3 (19 Dec 2024 04:00), Max: 99.3 (19 Dec 2024 04:00)  HR: 125 (19 Dec 2024 06:00) (97 - 135)  BP: 110/57 (19 Dec 2024 06:00) (103/86 - 127/87)  BP(mean): 78 (19 Dec 2024 06:00) (75 - 96)  ABP: --  ABP(mean): --  RR: 31 (19 Dec 2024 06:00) (20 - 35)  SpO2: 99% (19 Dec 2024 06:00) (94% - 100%)    O2 Parameters below as of 18 Dec 2024 20:23  Patient On (Oxygen Delivery Method): nasal cannula  O2 Flow (L/min): 3            12-18 @ 07:01  -  12-19 @ 06:19  --------------------------------------------------------  IN: 4796.8 mL / OUT: 2950 mL / NET: 1846.8 mL      CAPILLARY BLOOD GLUCOSE      POCT Blood Glucose.: 249 mg/dL (19 Dec 2024 06:11)    PHYSICAL EXAM:  GENERAL: ill appearing, lethargic  HEAD:  Atraumatic, normocephalic  EYES: EOMI, PERRLA, conjunctiva and sclera clear  ENT: dry mucous membranes  NECK: Supple, no JVD  HEART: Regular rate and rhythm, no murmurs, rubs, or gallops  LUNGS: kussmaul respirations.  Clear to auscultation bilaterally, no crackles, wheezing, or rhonchi  ABDOMEN: Soft, nontender, nondistended, +BS  EXTREMITIES: 2+ peripheral pulses bilaterally. No clubbing, cyanosis, or edema  NERVOUS SYSTEM:  A&Ox3, no focal deficits but lethargic  SKIN: No rashes or lesions    HOSPITAL MEDICATIONS:  MEDICATIONS  (STANDING):  azithromycin  IVPB 500 milliGRAM(s) IV Intermittent every 24 hours  chlorhexidine 2% Cloths 1 Application(s) Topical <User Schedule>  dextrose 5% + lactated ringers 1000 milliLiter(s) (100 mL/Hr) IV Continuous <Continuous>  heparin   Injectable 5000 Unit(s) SubCutaneous every 8 hours  insulin regular Infusion 6 Unit(s)/Hr (6 mL/Hr) IV Continuous <Continuous>  piperacillin/tazobactam IVPB.. 3.375 Gram(s) IV Intermittent every 8 hours  potassium chloride    Tablet ER 40 milliEquivalent(s) Oral once  potassium chloride  10 mEq/100 mL IVPB 10 milliEquivalent(s) IV Intermittent every 1 hour  potassium phosphate IVPB 30 milliMole(s) IV Intermittent once  sodium bicarbonate  Infusion 0.258 mEq/kG/Hr (100 mL/Hr) IV Continuous <Continuous>    MEDICATIONS  (PRN):      LABS:                        12.0   28.70 )-----------( 275      ( 18 Dec 2024 23:23 )             37.5     Bands 3.5    12-19    131[L]  |  103  |  11  ----------------------------<  208[H]  3.2[L]   |  <10[LL]  |  0.39[L]    Ca    7.5[L]      19 Dec 2024 04:01  Phos  0.6     12-19  Mg     2.1     12-19    TPro  6.0  /  Alb  2.9[L]  /  TBili  0.2  /  DBili  x   /  AST  18  /  ALT  9[L]  /  AlkPhos  102  12-19          PT/INR - ( 18 Dec 2024 23:23 )   PT: 11.7 sec;   INR: 1.03 ratio         PTT - ( 18 Dec 2024 23:23 )  PTT:24.7 sec  Urinalysis Basic - ( 19 Dec 2024 04:01 )    Color: x / Appearance: x / SG: x / pH: x  Gluc: 208 mg/dL / Ketone: x  / Bili: x / Urobili: x   Blood: x / Protein: x / Nitrite: x   Leuk Esterase: x / RBC: x / WBC x   Sq Epi: x / Non Sq Epi: x / Bacteria: x         MICROBIOLOGY:     Radiology: ***    Bedside lung ultrasound: ***    Bedside ECHO: ***    EKG:    CENTRAL LINE: Y/N          DATE INSERTED:              REMOVE: Y/N    BA: Y/N                        DATE INSERTED:              REMOVE: Y/N    A-LINE: Y/N                       DATE INSERTED:              REMOVE: Y/N    GLOBAL ISSUE/BEST PRACTICE:  Analgesia:  Sedation:  HOB elevation: yes  Stress ulcer prophylaxis:  VTE prophylaxis:  Glycemic control:  Nutrition:    CODE STATUS: ***  GOC discussion: Y      
Chief Complaint: Diabetes Mellitus follow up    INTERVAL HX:  Patient seen at bedside with her mother present.   Reports eating well, finishes about half of the food on each tray.   BG over the last 24 hrs have been within goal range 100-180mg/dl. No hypoglycemia.     Review of Systems:  General: As above  GI: No nausea, vomiting  Endocrine: no  S&Sx of hypoglycemia    Allergies    No Known Allergies    Intolerances      MEDICATIONS  (STANDING):  ciprofloxacin  0.3% Ophthalmic Solution for Otic Use 5 Drop(s) Left Ear two times a day  fluticasone propionate 50 MICROgram(s)/spray Nasal Spray 1 Spray(s) Both Nostrils two times a day  heparin   Injectable 5000 Unit(s) SubCutaneous every 8 hours  insulin glargine Injectable (LANTUS) 28 Unit(s) SubCutaneous every morning  insulin lispro (ADMELOG) corrective regimen sliding scale   SubCutaneous three times a day before meals  insulin lispro (ADMELOG) corrective regimen sliding scale   SubCutaneous at bedtime  insulin lispro Injectable (ADMELOG) 6 Unit(s) SubCutaneous three times a day before meals  oseltamivir Suspension 75 milliGRAM(s) Oral two times a day  piperacillin/tazobactam IVPB.. 3.375 Gram(s) IV Intermittent every 8 hours  potassium phosphate / sodium phosphate Tablet (K-PHOS No. 2) 1 Tablet(s) Oral every 6 hours      insulin glargine Injectable (LANTUS)   28 Unit(s) SubCutaneous (12-21-24 @ 09:16)    insulin lispro (ADMELOG) corrective regimen sliding scale   2 Unit(s) SubCutaneous (12-21-24 @ 08:43)   1 Unit(s) SubCutaneous (12-20-24 @ 17:22)    insulin lispro Injectable (ADMELOG)   6 Unit(s) SubCutaneous (12-21-24 @ 08:42)   6 Unit(s) SubCutaneous (12-20-24 @ 17:21)   6 Unit(s) SubCutaneous (12-20-24 @ 13:34)        PHYSICAL EXAM:  VITALS: T(C): 36.8 (12-21-24 @ 10:28)  T(F): 98.2 (12-21-24 @ 10:28), Max: 98.4 (12-20-24 @ 16:00)  HR: 93 (12-21-24 @ 10:28) (90 - 107)  BP: 96/65 (12-21-24 @ 10:28) (96/65 - 113/78)  RR:  (20 - 30)  SpO2:  (95% - 98%)  Wt(kg): --  GENERAL: NAD  Respiratory: Respirations unlabored   Extremities: Warm, no edema  NEURO: Alert , appropriate     LABS:  POCT Blood Glucose.: 240 mg/dL (12-21-24 @ 08:12)  POCT Blood Glucose.: 157 mg/dL (12-20-24 @ 21:56)  POCT Blood Glucose.: 156 mg/dL (12-20-24 @ 17:14)  POCT Blood Glucose.: 127 mg/dL (12-20-24 @ 13:33)  POCT Blood Glucose.: 155 mg/dL (12-20-24 @ 12:13)  POCT Blood Glucose.: 114 mg/dL (12-20-24 @ 10:48)  POCT Blood Glucose.: 131 mg/dL (12-20-24 @ 09:51)  POCT Blood Glucose.: 135 mg/dL (12-20-24 @ 09:03)  POCT Blood Glucose.: 164 mg/dL (12-20-24 @ 08:15)  POCT Blood Glucose.: 200 mg/dL (12-20-24 @ 06:54)  POCT Blood Glucose.: 189 mg/dL (12-20-24 @ 06:05)  POCT Blood Glucose.: 188 mg/dL (12-20-24 @ 05:13)  POCT Blood Glucose.: 192 mg/dL (12-20-24 @ 04:08)  POCT Blood Glucose.: 200 mg/dL (12-20-24 @ 03:03)  POCT Blood Glucose.: 202 mg/dL (12-20-24 @ 02:04)  POCT Blood Glucose.: 199 mg/dL (12-20-24 @ 01:02)  POCT Blood Glucose.: 203 mg/dL (12-20-24 @ 00:12)  POCT Blood Glucose.: 170 mg/dL (12-19-24 @ 23:07)  POCT Blood Glucose.: 160 mg/dL (12-19-24 @ 22:02)  POCT Blood Glucose.: 158 mg/dL (12-19-24 @ 20:57)  POCT Blood Glucose.: 166 mg/dL (12-19-24 @ 19:59)  POCT Blood Glucose.: 172 mg/dL (12-19-24 @ 18:51)  POCT Blood Glucose.: 169 mg/dL (12-19-24 @ 18:13)  POCT Blood Glucose.: 195 mg/dL (12-19-24 @ 17:02)  POCT Blood Glucose.: 217 mg/dL (12-19-24 @ 15:01)  POCT Blood Glucose.: 237 mg/dL (12-19-24 @ 14:05)  POCT Blood Glucose.: 253 mg/dL (12-19-24 @ 13:09)  POCT Blood Glucose.: 210 mg/dL (12-19-24 @ 11:05)  POCT Blood Glucose.: 209 mg/dL (12-19-24 @ 10:13)  POCT Blood Glucose.: 225 mg/dL (12-19-24 @ 09:04)  POCT Blood Glucose.: 204 mg/dL (12-19-24 @ 08:15)  POCT Blood Glucose.: 226 mg/dL (12-19-24 @ 06:53)  POCT Blood Glucose.: 249 mg/dL (12-19-24 @ 06:11)  POCT Blood Glucose.: 201 mg/dL (12-19-24 @ 05:12)  POCT Blood Glucose.: 203 mg/dL (12-19-24 @ 03:50)  POCT Blood Glucose.: 225 mg/dL (12-19-24 @ 03:05)  POCT Blood Glucose.: 207 mg/dL (12-19-24 @ 02:03)  POCT Blood Glucose.: 213 mg/dL (12-19-24 @ 01:01)  POCT Blood Glucose.: 270 mg/dL (12-19-24 @ 00:07)  POCT Blood Glucose.: 261 mg/dL (12-18-24 @ 23:16)  POCT Blood Glucose.: 289 mg/dL (12-18-24 @ 22:03)  POCT Blood Glucose.: 363 mg/dL (12-18-24 @ 21:10)  POCT Blood Glucose.: 355 mg/dL (12-18-24 @ 19:50)  POCT Blood Glucose.: 392 mg/dL (12-18-24 @ 18:46)  POCT Blood Glucose.: 379 mg/dL (12-18-24 @ 16:47)                          8.4    10.13 )-----------( 223      ( 20 Dec 2024 00:32 )             24.1     12-20    132[L]  |  103  |  <4[L]  ----------------------------<  161[H]  4.2   |  19[L]  |  <0.30[L]    Ca    7.1[L]      20 Dec 2024 14:21  Phos  1.9     12-20  Mg     2.3     12-20    TPro  5.4[L]  /  Alb  2.6[L]  /  TBili  0.3  /  DBili  x   /  AST  14  /  ALT  6[L]  /  AlkPhos  86  12-20    eGFR: 143 mL/min/1.73m2 (20 Dec 2024 14:21)    12-18 Chol 186 Direct LDL -- LDL calculated 119[H] HDL 35[L] Trig 183[H]  Thyroid Function Tests:  12-18 @ 23:23 TSH 0.76 FreeT4 -- T3 -- Anti TPO -- Anti Thyroglobulin Ab -- TSI --      A1C with Estimated Average Glucose Result: >15.5 % (12-18-24 @ 23:23)    Estimated Average Glucose: >398 mg/dL (12-18-24 @ 23:23)        Diet, Consistent Carbohydrate w/Evening Snack (12-20-24 @ 09:34) [Active]            
  Patient is a 34y old  Female who presents with a chief complaint of DKA (18 Dec 2024 19:12)    ON Events: Still on insulin drip @ 2units/hr, D5LR with 20meq potassium @ 100cc/hr    REVIEW OF SYSTEMS:  CONSTITUTIONAL: +weakness, fevers, chills, lethargy  EYES/ENT: No visual changes;  No vertigo or throat pain, + left ear pain  NECK: No pain or stiffness  RESPIRATORY: +cough,  +shortness of breath  CARDIOVASCULAR: No chest pain or palpitations  GASTROINTESTINAL: No abdominal or epigastric pain. +nausea/wretching no vomiting.  No diarrhea or constipation. No melena or hematochezia.  GENITOURINARY: No dysuria, frequency or hematuria  NEUROLOGICAL: No numbness or weakness  SKIN: No itching, rashes    OBJECTIVE:  ICU Vital Signs Last 24 Hrs  T(C): 37.4 (19 Dec 2024 04:00), Max: 37.4 (19 Dec 2024 04:00)  T(F): 99.3 (19 Dec 2024 04:00), Max: 99.3 (19 Dec 2024 04:00)  HR: 125 (19 Dec 2024 06:00) (97 - 135)  BP: 110/57 (19 Dec 2024 06:00) (103/86 - 127/87)  BP(mean): 78 (19 Dec 2024 06:00) (75 - 96)  ABP: --  ABP(mean): --  RR: 31 (19 Dec 2024 06:00) (20 - 35)  SpO2: 99% (19 Dec 2024 06:00) (94% - 100%)    O2 Parameters below as of 18 Dec 2024 20:23  Patient On (Oxygen Delivery Method): nasal cannula  O2 Flow (L/min): 3            12-18 @ 07:01  -  12-19 @ 06:19  --------------------------------------------------------  IN: 4796.8 mL / OUT: 2950 mL / NET: 1846.8 mL      CAPILLARY BLOOD GLUCOSE      POCT Blood Glucose.: 249 mg/dL (19 Dec 2024 06:11)    PHYSICAL EXAM:  GENERAL: ill appearing, lethargic  HEAD:  Atraumatic, normocephalic  EYES: EOMI, PERRLA, conjunctiva and sclera clear  ENT: dry mucous membranes  NECK: Supple, no JVD  HEART: Regular rate and rhythm, no murmurs, rubs, or gallops  LUNGS: kussmaul respirations.  Clear to auscultation bilaterally, no crackles, wheezing, or rhonchi  ABDOMEN: Soft, nontender, nondistended, +BS  EXTREMITIES: 2+ peripheral pulses bilaterally. No clubbing, cyanosis, or edema  NERVOUS SYSTEM:  A&Ox3, no focal deficits but lethargic  SKIN: No rashes or lesions    HOSPITAL MEDICATIONS:  MEDICATIONS  (STANDING):  azithromycin  IVPB 500 milliGRAM(s) IV Intermittent every 24 hours  chlorhexidine 2% Cloths 1 Application(s) Topical <User Schedule>  dextrose 5% + lactated ringers 1000 milliLiter(s) (100 mL/Hr) IV Continuous <Continuous>  heparin   Injectable 5000 Unit(s) SubCutaneous every 8 hours  insulin regular Infusion 6 Unit(s)/Hr (6 mL/Hr) IV Continuous <Continuous>  piperacillin/tazobactam IVPB.. 3.375 Gram(s) IV Intermittent every 8 hours  potassium chloride    Tablet ER 40 milliEquivalent(s) Oral once  potassium chloride  10 mEq/100 mL IVPB 10 milliEquivalent(s) IV Intermittent every 1 hour  potassium phosphate IVPB 30 milliMole(s) IV Intermittent once  sodium bicarbonate  Infusion 0.258 mEq/kG/Hr (100 mL/Hr) IV Continuous <Continuous>    MEDICATIONS  (PRN):      LABS:                        12.0   28.70 )-----------( 275      ( 18 Dec 2024 23:23 )             37.5     Bands 3.5    12-19    131[L]  |  103  |  11  ----------------------------<  208[H]  3.2[L]   |  <10[LL]  |  0.39[L]    Ca    7.5[L]      19 Dec 2024 04:01  Phos  0.6     12-19  Mg     2.1     12-19    TPro  6.0  /  Alb  2.9[L]  /  TBili  0.2  /  DBili  x   /  AST  18  /  ALT  9[L]  /  AlkPhos  102  12-19          PT/INR - ( 18 Dec 2024 23:23 )   PT: 11.7 sec;   INR: 1.03 ratio         PTT - ( 18 Dec 2024 23:23 )  PTT:24.7 sec  Urinalysis Basic - ( 19 Dec 2024 04:01 )    Color: x / Appearance: x / SG: x / pH: x  Gluc: 208 mg/dL / Ketone: x  / Bili: x / Urobili: x   Blood: x / Protein: x / Nitrite: x   Leuk Esterase: x / RBC: x / WBC x   Sq Epi: x / Non Sq Epi: x / Bacteria: x         MICROBIOLOGY:     Radiology: ***    Bedside lung ultrasound: ***    Bedside ECHO: ***    EKG:    CENTRAL LINE: Y/N          DATE INSERTED:              REMOVE: Y/N    BA: Y/N                        DATE INSERTED:              REMOVE: Y/N    A-LINE: Y/N                       DATE INSERTED:              REMOVE: Y/N    GLOBAL ISSUE/BEST PRACTICE:  Analgesia:  Sedation:  HOB elevation: yes  Stress ulcer prophylaxis:  VTE prophylaxis:  Glycemic control:  Nutrition:    CODE STATUS: ***  GO discussion: Y      
PROGRESS NOTE:   Authored by Dr. Elma Mendez MD, Available on MS Teams    Patient is a 34y old  Female who presents with a chief complaint of DKA (21 Dec 2024 12:06)      SUBJECTIVE / OVERNIGHT EVENTS: Patient has nausea but no vomiting. Continues to have L ear pain.     ADDITIONAL REVIEW OF SYSTEMS:    MEDICATIONS  (STANDING):  ciprofloxacin  0.3% Ophthalmic Solution for Otic Use 5 Drop(s) Left Ear two times a day  fluticasone propionate 50 MICROgram(s)/spray Nasal Spray 1 Spray(s) Both Nostrils two times a day  heparin   Injectable 5000 Unit(s) SubCutaneous every 8 hours  insulin glargine Injectable (LANTUS) 28 Unit(s) SubCutaneous every morning  insulin lispro (ADMELOG) corrective regimen sliding scale   SubCutaneous three times a day before meals  insulin lispro (ADMELOG) corrective regimen sliding scale   SubCutaneous at bedtime  insulin lispro Injectable (ADMELOG) 6 Unit(s) SubCutaneous three times a day before meals  oseltamivir Suspension 75 milliGRAM(s) Oral two times a day  piperacillin/tazobactam IVPB.. 3.375 Gram(s) IV Intermittent every 8 hours  potassium phosphate / sodium phosphate Tablet (K-PHOS No. 2) 1 Tablet(s) Oral every 6 hours    MEDICATIONS  (PRN):  acetaminophen   Oral Liquid .. 650 milliGRAM(s) Oral every 6 hours PRN Temp greater or equal to 38C (100.4F)  ondansetron Injectable 4 milliGRAM(s) IV Push once PRN Nausea and/or Vomiting      CAPILLARY BLOOD GLUCOSE      POCT Blood Glucose.: 176 mg/dL (21 Dec 2024 12:50)  POCT Blood Glucose.: 240 mg/dL (21 Dec 2024 08:12)  POCT Blood Glucose.: 157 mg/dL (20 Dec 2024 21:56)  POCT Blood Glucose.: 156 mg/dL (20 Dec 2024 17:14)    I&O's Summary    20 Dec 2024 07:01  -  21 Dec 2024 07:00  --------------------------------------------------------  IN: 2733.2 mL / OUT: 4000 mL / NET: -1266.8 mL        PHYSICAL EXAM:  Vital Signs Last 24 Hrs  T(C): 36.6 (21 Dec 2024 13:21), Max: 36.9 (20 Dec 2024 16:00)  T(F): 97.8 (21 Dec 2024 13:21), Max: 98.4 (20 Dec 2024 16:00)  HR: 95 (21 Dec 2024 13:21) (90 - 107)  BP: 107/74 (21 Dec 2024 13:21) (96/65 - 113/78)  BP(mean): 75 (20 Dec 2024 21:00) (74 - 80)  RR: 20 (21 Dec 2024 13:21) (20 - 30)  SpO2: 100% (21 Dec 2024 13:21) (95% - 100%)    Parameters below as of 21 Dec 2024 13:21  Patient On (Oxygen Delivery Method): room air        CONSTITUTIONAL: NAD  RESPIRATORY: Normal respiratory effort; no wheezing  CARDIOVASCULAR: Regular rate and rhythm, normal S1 and S2, no murmur/rub/gallop; No lower extremity edema  ABDOMEN: Nontender to palpation, normoactive bowel sounds, no rebound/guarding  MUSCLOSKELETAL: no clubbing or cyanosis of digits; no joint swelling or tenderness to palpation  PSYCH: A+O to person, place, and time; affect appropriate    LABS:                        9.9    4.56  )-----------( 300      ( 21 Dec 2024 13:23 )             29.4     12-20    132[L]  |  103  |  <4[L]  ----------------------------<  161[H]  4.2   |  19[L]  |  <0.30[L]    Ca    7.1[L]      20 Dec 2024 14:21  Phos  1.9     12-20  Mg     2.3     12-20    TPro  5.4[L]  /  Alb  2.6[L]  /  TBili  0.3  /  DBili  x   /  AST  14  /  ALT  6[L]  /  AlkPhos  86  12-20    PT/INR - ( 20 Dec 2024 00:32 )   PT: 12.8 sec;   INR: 1.11 ratio         PTT - ( 20 Dec 2024 00:32 )  PTT:25.8 sec      Urinalysis Basic - ( 20 Dec 2024 14:21 )    Color: x / Appearance: x / SG: x / pH: x  Gluc: 161 mg/dL / Ketone: x  / Bili: x / Urobili: x   Blood: x / Protein: x / Nitrite: x   Leuk Esterase: x / RBC: x / WBC x   Sq Epi: x / Non Sq Epi: x / Bacteria: x        Culture - Urine (collected 18 Dec 2024 17:40)  Source: Clean Catch Clean Catch (Midstream)  Final Report (19 Dec 2024 22:13):    No growth    Culture - Blood (collected 18 Dec 2024 17:07)  Source: .Blood BLOOD  Preliminary Report (20 Dec 2024 23:01):    No growth at 48 Hours    Culture - Blood (collected 18 Dec 2024 17:00)  Source: .Blood BLOOD  Preliminary Report (20 Dec 2024 23:01):    No growth at 48 Hours
 Seen earlier today     Chief Complaint: Diabetes Mellitus follow up    INTERVAL HX: Tolerating POs, eats well. Last 24 hour BGs 153-200s with fasting  and variable prandial BG ( 100s-200s )Discharge planning today . pt is interested in CGM use. Downloaded Dexcom G7 and applied sensor before discharge. the use of CGM reviewed       Review of Systems:  General: As above  GI: No nausea, vomiting  Endocrine: no  S&Sx of hypoglycemia    Allergies    No Known Allergies    Intolerances      MEDICATIONS  (STANDING):  ciprofloxacin  0.3% Ophthalmic Solution for Otic Use 5 Drop(s) Left Ear two times a day  fluticasone propionate 50 MICROgram(s)/spray Nasal Spray 1 Spray(s) Both Nostrils two times a day  heparin   Injectable 5000 Unit(s) SubCutaneous every 8 hours  insulin glargine Injectable (LANTUS) 30 Unit(s) SubCutaneous every morning  insulin lispro (ADMELOG) corrective regimen sliding scale   SubCutaneous three times a day before meals  insulin lispro (ADMELOG) corrective regimen sliding scale   SubCutaneous at bedtime  insulin lispro Injectable (ADMELOG) 6 Unit(s) SubCutaneous three times a day before meals  oseltamivir Suspension 75 milliGRAM(s) Oral two times a day  piperacillin/tazobactam IVPB.. 3.375 Gram(s) IV Intermittent every 8 hours  potassium phosphate / sodium phosphate Tablet (K-PHOS No. 2) 1 Tablet(s) Oral every 6 hours      insulin glargine Injectable (LANTUS)   30 Unit(s) SubCutaneous (12-22-24 @ 08:34)    insulin lispro (ADMELOG) corrective regimen sliding scale   2 Unit(s) SubCutaneous (12-22-24 @ 12:18)   2 Unit(s) SubCutaneous (12-22-24 @ 08:33)    insulin lispro Injectable (ADMELOG)   6 Unit(s) SubCutaneous (12-22-24 @ 12:19)   6 Unit(s) SubCutaneous (12-22-24 @ 08:33)        PHYSICAL EXAM:  VITALS: T(C): 36.6 (12-22-24 @ 13:24)  T(F): 97.8 (12-22-24 @ 13:24), Max: 97.9 (12-22-24 @ 10:33)  HR: 91 (12-22-24 @ 13:24) (89 - 96)  BP: 116/82 (12-22-24 @ 13:24) (108/72 - 118/71)  RR:  (18 - 18)  SpO2:  (97% - 99%)  Wt(kg): --  GENERAL: Female laying in bed, in NAD  Respiratory: Respirations unlabored   Extremities: Warm, no edema  NEURO: Alert , appropriate     LABS:  POCT Blood Glucose.: 217 mg/dL (12-22-24 @ 12:05)  POCT Blood Glucose.: 235 mg/dL (12-22-24 @ 08:13)  POCT Blood Glucose.: 208 mg/dL (12-21-24 @ 20:55)  POCT Blood Glucose.: 153 mg/dL (12-21-24 @ 17:01)  POCT Blood Glucose.: 176 mg/dL (12-21-24 @ 12:50)  POCT Blood Glucose.: 240 mg/dL (12-21-24 @ 08:12)  POCT Blood Glucose.: 157 mg/dL (12-20-24 @ 21:56)  POCT Blood Glucose.: 156 mg/dL (12-20-24 @ 17:14)  POCT Blood Glucose.: 127 mg/dL (12-20-24 @ 13:33)  POCT Blood Glucose.: 155 mg/dL (12-20-24 @ 12:13)  POCT Blood Glucose.: 114 mg/dL (12-20-24 @ 10:48)  POCT Blood Glucose.: 131 mg/dL (12-20-24 @ 09:51)  POCT Blood Glucose.: 135 mg/dL (12-20-24 @ 09:03)  POCT Blood Glucose.: 164 mg/dL (12-20-24 @ 08:15)  POCT Blood Glucose.: 200 mg/dL (12-20-24 @ 06:54)  POCT Blood Glucose.: 189 mg/dL (12-20-24 @ 06:05)  POCT Blood Glucose.: 188 mg/dL (12-20-24 @ 05:13)  POCT Blood Glucose.: 192 mg/dL (12-20-24 @ 04:08)  POCT Blood Glucose.: 200 mg/dL (12-20-24 @ 03:03)  POCT Blood Glucose.: 202 mg/dL (12-20-24 @ 02:04)  POCT Blood Glucose.: 199 mg/dL (12-20-24 @ 01:02)  POCT Blood Glucose.: 203 mg/dL (12-20-24 @ 00:12)  POCT Blood Glucose.: 170 mg/dL (12-19-24 @ 23:07)  POCT Blood Glucose.: 160 mg/dL (12-19-24 @ 22:02)  POCT Blood Glucose.: 158 mg/dL (12-19-24 @ 20:57)  POCT Blood Glucose.: 166 mg/dL (12-19-24 @ 19:59)                          9.9    4.56  )-----------( 300      ( 21 Dec 2024 13:23 )             29.4     12-21    133[L]  |  99  |  6[L]  ----------------------------<  164[H]  3.8   |  22  |  <0.30[L]    Ca    9.0      21 Dec 2024 13:23  Phos  3.4     12-21  Mg     2.3     12-21    TPro  6.7  /  Alb  3.1[L]  /  TBili  0.4  /  DBili  x   /  AST  11  /  ALT  7[L]  /  AlkPhos  107  12-21 12-18 Chol 186 Direct LDL -- LDL calculated 119[H] HDL 35[L] Trig 183[H]  Thyroid Function Tests:  12-18 @ 23:23 TSH 0.76 FreeT4 -- T3 -- Anti TPO -- Anti Thyroglobulin Ab -- TSI --      A1C with Estimated Average Glucose Result: >15.5 % (12-18-24 @ 23:23)    Estimated Average Glucose: >398 mg/dL (12-18-24 @ 23:23)        Diet, Consistent Carbohydrate w/Evening Snack (12-20-24 @ 09:34) [Active]            
ENDOCRINE FOLLOW UP     Chief Complaint: T1DM, DKA    History: pt seen at bedside, much better today. tolerating PO diet. transitioned off insulin gtt    MEDICATIONS  (STANDING):  chlorhexidine 2% Cloths 1 Application(s) Topical <User Schedule>  ciprofloxacin  0.3% Ophthalmic Solution for Otic Use 5 Drop(s) Left Ear two times a day  fluticasone propionate 50 MICROgram(s)/spray Nasal Spray 1 Spray(s) Both Nostrils two times a day  heparin   Injectable 5000 Unit(s) SubCutaneous every 8 hours  insulin glargine Injectable (LANTUS) 28 Unit(s) SubCutaneous every morning  insulin lispro (ADMELOG) corrective regimen sliding scale   SubCutaneous three times a day before meals  insulin lispro (ADMELOG) corrective regimen sliding scale   SubCutaneous at bedtime  insulin lispro Injectable (ADMELOG) 6 Unit(s) SubCutaneous three times a day before meals  oseltamivir Suspension 75 milliGRAM(s) Oral two times a day  piperacillin/tazobactam IVPB.. 3.375 Gram(s) IV Intermittent every 8 hours  potassium phosphate / sodium phosphate Tablet (K-PHOS No. 2) 1 Tablet(s) Oral every 6 hours    MEDICATIONS  (PRN):  acetaminophen   Oral Liquid .. 650 milliGRAM(s) Oral every 6 hours PRN Temp greater or equal to 38C (100.4F)      Allergies    No Known Allergies    Intolerances        ROS: All other systems reviewed and negative    PHYSICAL EXAM:  VITALS: T(C): 36.9 (12-20-24 @ 12:00)  T(F): 98.5 (12-20-24 @ 12:00), Max: 99 (12-20-24 @ 00:00)  HR: 101 (12-20-24 @ 14:00) (97 - 120)  BP: 110/56 (12-20-24 @ 14:00) (91/55 - 111/60)  RR:  (12 - 33)  SpO2:  (96% - 99%)  Wt(kg): --  GENERAL: NAD, resting comfortably   EYES: No proptosis,  anicteric  HEENT:  Atraumatic, Normocephalic, moist mucous membranes  RESPIRATORY: Nonlabored respirations on room air, normal rate/effort   NEURO: AOx3, moves all extremities spontaenuously   PSYCH:  reactive affect, euthymic mood    POCT Blood Glucose.: 127 mg/dL (12-20-24 @ 13:33)  POCT Blood Glucose.: 155 mg/dL (12-20-24 @ 12:13)  POCT Blood Glucose.: 114 mg/dL (12-20-24 @ 10:48)  POCT Blood Glucose.: 131 mg/dL (12-20-24 @ 09:51)  POCT Blood Glucose.: 135 mg/dL (12-20-24 @ 09:03)  POCT Blood Glucose.: 164 mg/dL (12-20-24 @ 08:15)  POCT Blood Glucose.: 200 mg/dL (12-20-24 @ 06:54)  POCT Blood Glucose.: 189 mg/dL (12-20-24 @ 06:05)  POCT Blood Glucose.: 188 mg/dL (12-20-24 @ 05:13)  POCT Blood Glucose.: 192 mg/dL (12-20-24 @ 04:08)  POCT Blood Glucose.: 200 mg/dL (12-20-24 @ 03:03)  POCT Blood Glucose.: 202 mg/dL (12-20-24 @ 02:04)  POCT Blood Glucose.: 199 mg/dL (12-20-24 @ 01:02)  POCT Blood Glucose.: 203 mg/dL (12-20-24 @ 00:12)  POCT Blood Glucose.: 170 mg/dL (12-19-24 @ 23:07)  POCT Blood Glucose.: 160 mg/dL (12-19-24 @ 22:02)  POCT Blood Glucose.: 158 mg/dL (12-19-24 @ 20:57)  POCT Blood Glucose.: 166 mg/dL (12-19-24 @ 19:59)  POCT Blood Glucose.: 172 mg/dL (12-19-24 @ 18:51)  POCT Blood Glucose.: 169 mg/dL (12-19-24 @ 18:13)  POCT Blood Glucose.: 195 mg/dL (12-19-24 @ 17:02)  POCT Blood Glucose.: 217 mg/dL (12-19-24 @ 15:01)  POCT Blood Glucose.: 237 mg/dL (12-19-24 @ 14:05)  POCT Blood Glucose.: 253 mg/dL (12-19-24 @ 13:09)  POCT Blood Glucose.: 210 mg/dL (12-19-24 @ 11:05)  POCT Blood Glucose.: 209 mg/dL (12-19-24 @ 10:13)  POCT Blood Glucose.: 225 mg/dL (12-19-24 @ 09:04)  POCT Blood Glucose.: 204 mg/dL (12-19-24 @ 08:15)  POCT Blood Glucose.: 226 mg/dL (12-19-24 @ 06:53)  POCT Blood Glucose.: 249 mg/dL (12-19-24 @ 06:11)  POCT Blood Glucose.: 201 mg/dL (12-19-24 @ 05:12)  POCT Blood Glucose.: 203 mg/dL (12-19-24 @ 03:50)  POCT Blood Glucose.: 225 mg/dL (12-19-24 @ 03:05)  POCT Blood Glucose.: 207 mg/dL (12-19-24 @ 02:03)  POCT Blood Glucose.: 213 mg/dL (12-19-24 @ 01:01)  POCT Blood Glucose.: 270 mg/dL (12-19-24 @ 00:07)  POCT Blood Glucose.: 261 mg/dL (12-18-24 @ 23:16)  POCT Blood Glucose.: 289 mg/dL (12-18-24 @ 22:03)  POCT Blood Glucose.: 363 mg/dL (12-18-24 @ 21:10)  POCT Blood Glucose.: 355 mg/dL (12-18-24 @ 19:50)  POCT Blood Glucose.: 392 mg/dL (12-18-24 @ 18:46)  POCT Blood Glucose.: 379 mg/dL (12-18-24 @ 16:47)      12-20    132[L]  |  103  |  <4[L]  ----------------------------<  161[H]  4.2   |  19[L]  |  <0.30[L]    eGFR: 143    Ca    7.1[L]      12-20  Mg     2.3     12-20  Phos  1.9     12-20    TPro  5.4[L]  /  Alb  2.6[L]  /  TBili  0.3  /  DBili  x   /  AST  14  /  ALT  6[L]  /  AlkPhos  86  12-20      A1C with Estimated Average Glucose Result: >15.5 % (12-18-24 @ 23:23)      Thyroid Stimulating Hormone, Serum: 0.76 uIU/mL (12-18-24 @ 23:23)

## 2024-12-22 NOTE — PROGRESS NOTE ADULT - PROBLEM SELECTOR PROBLEM 1
Uncontrolled diabetes mellitus with hyperglycemia
Uncontrolled diabetes mellitus with ketoacidosis
Uncontrolled diabetes mellitus with ketoacidosis
DKA (diabetic ketoacidosis)

## 2024-12-22 NOTE — CHART NOTE - NSCHARTNOTEFT_GEN_A_CORE
Patient seen and examined this am, ready for discharge  Discussed dc insulin regimen with endocrinology team  Discussed w/ ACP Valerie

## 2024-12-22 NOTE — PROGRESS NOTE ADULT - ASSESSMENT
Patient with hx of type 1 DM on 70/30 who presents  with 5 days of cough/fatigue/nausea and found to have flu A positive and labs c/w DKA,  pH <6.90, HCO3 = 3.0, BHB >8, glucosuria, ketonuria, initial serum glucose 379, s/p 2L bolus in ED and started on insulin drip. Pt also with leukocytosis, fever, cough- treating empirically for superimposed bacterial PNA.  Patient admitted to MICU for further management of DKA.  PLAN:    NEURO  #No active issues  -A&O x 3, more awake today  -c/o some pain in the left ear, pt with Otitis Externa  -Activity as tolerated    CV  #no active issues  -Hemodynamically Stable  -Lipid profile pending    RESPIRATORY  #irregular breathing pattern  - Kussmaul breathing iso severe acidosis, now resolving  -on RA 02sat>95%    RENAL  #HAGMA  - ph <6.90 x2; bicarb 3 in setting of DKA  - improvement with fluid resuscitation and insulin gtt  - VBG q4hr   - s/p bicarb gtt @ 100cc/hr  - c/w DSLR with 20meq potassium @ 100cc/hr  - Electrolyte replacement    GI  #Diet  - NPO iso DKA/insulin gtt    ENDOCRINE  #DKA  #Type 1 Diabetes mellitus  - home regimen 70/30: 30u am 20u pm  - seemingly iso influenza infxn, unclear if adherent to insulin last few days while sick  - in ED pH <6.90, BHB >8, glucosuria, ketonuria, initial serum glucose 379  - Currently Ph =7.37, HCO3=17, AG=11 and BHB=1.7  - insulin gtt at 2u/hr  - s/p 2L in ED, continue fluid resuscitation    - maintenance fluids with insulin gtt; BMP q4hr  -A1c pending  -TSH pending  -Endocrine on board    ID  #influenza  - flu A positive iso  flu A positive  - c/w tamiflu  - trend WBC, fu cultures, trend fever curve  - will cover empirically with zosyn  -d/c Zithromax, legionella negative  -Blood cx and urine cx pending  -Pt with Otitis Externa, c/w Cipro, ENT consulted    HEME  #Leukocytosis  - likely reactive iso DKA +/- flu infection  - abx as above; empiric abx as above, will stop if leukocytosis resolves and cx negative    PPX  #Heparin SQ  
Patient with hx of type 1 DM on 70/30 who presents  with 5 days of cough/fatigue/nausea and found to have flu A positive and labs c/w DKA,  pH <6.90, HCO3 = 3.0, BHB >8, glucosuria, ketonuria, initial serum glucose 379, s/p 2L bolus in ED and started on insulin drip. Pt also with leukocytosis, fever, cough- treating empirically for superimposed bacterial PNA.  Patient admitted to MICU for further management of DKA.  PLAN:    NEURO  #No active issues  AO3 though somewhat lethargic iso DKA    CV  #no active issues  -Lipid profile pending    RESPIRATORY  #irregular breathing pattern  - Kussmaul breathing iso severe acidosis  - able to protect airway and speak    RENAL  #HAGMA  - ph <6.90 x2; bicarb 3 in setting of DKA  - expect improvement with fluid resuscitation and insulin gtt  - VBG q4hr   - c/w bicarb gtt @ 100cc/hr    GI  #Diet  - NPO iso DKA/insulin gtt    ENDOCRINE  #DKA  #Type 1 Diabetes mellitus  - home regimen 70/30: 30u am 20u pm  - seemingly iso influenza infxn, unclear if adherent to insulin last few days while sick  - pH <6.90, BHB >8, glucosuria, ketonuria, initial serum glucose 379  - insulin gtt at 5u/hr  - s/p 2L in ED, continue fluid resuscitation    - maintenance fluids with insulin gtt; BMP q4hr  -A1c pending  -TSH pending    ID  #influenza  - flu A positive iso  flu A positive  - outside window for tamiflu  - trend WBC, fu cultures, trend fever curve  - will cover empirically with zosyn + azithro for now  -Blood cx and urine cx pending  - f/u urine legionella, strep    HEME  #Leukocytosis  - likely reactive iso DKA +/- flu infection  - abx as above; empiric abx as above, will stop if leukocytosis resolves and cx negative    PPX  #subQhep  
  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 Endocrinology consulted for DKA (high risk patient with severely uncontrolled diabetes with DKA and A1c of 14.8% at high risk of CAD and CVA with high medical complexity and high level decision-making).   patient reports she is feeling better and is eating about half of the food on her tray. She is willing to switch to basal bolus insulin when she is discharged.  May be a good candidate for an insulin pump.    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 C-peptide 1.2 in 2016 (no glucose), KARLI and islet cell antibodies negative  - HgbA1c >15%  - Home regimen: novolog 70/30 insulin 40 units in the AM, 30 units in the PM. Ran out 1 month ago  - eGFR: 135 mL/min/1.73m2 (12-19-24)  - Weight (kg): 58.1 (12-18-24)      INPATIENT PLAN:  - Increase Lantus  to 30 units daily in the morning  - Continue Admelog 6 units TID before meals (HOLD if NPO or not eating)  - Continue Low dose admelog correction scale TIDQAC and separate low dose scale QHS  - Please check FSG before meals and QHS, or q6h while NPO  - Inpatient glucose goals: 100-180  - consistent carb diet when eating  - followup Glutamic Acid Decarboxylase antibody, Zinc Transporter 8 Ab, Islet Antigen (IA-2) Antibody  - once improving, will plan to check c-peptide (send with BMP for glucose)      DISCHARGE PLANNING:  - Discharge recs pending clinical course: will need basal/bolus insulin (doses TBD)  - Patient would benefit from CGM- please place order for CGM (Dexcom G7 or Freestyle Lucita 3 whichever is covered by patient insurance)- please send for reader plus sensor x3. Will supply patient with sample/set up ramon prior to discharge.   - will need Endocrinology follow up. Please provide clinic info in DC paperwork for patient to make an appointment: Endocrinology [facility]: [address] [place] [address]. [address]. [place] [address]. Tel: [phone]    #Hypertension  - Goal BP <130/80  - Management as per primary team  - check urine microalbumin level as outpatient    #Hyperlipidemia  - LDL goal <70  - check lipid panel as outpatient    Contact via Microsoft Teams during business hours  To reach covering provider access AMION via sunrise tools  For Urgent matters/after-hours/weekends/holidays please page endocrine fellow on call   For nonurgent matters please email [email or web address]    Please note that this patient may be followed by different provider tomorrow.  Notify endocrine 24 hours prior to discharge for final recommendations   
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 Endocrinology consulted for DKA (high risk patient with severely uncontrolled diabetes with DKA and A1c of 14.8% at high risk of CAD and CVA with high medical complexity and high level decision-making). BG Goal 100-180mg/dl   Tolerating POs, eats well. Last 24 hour BGs 153-200s with fasting  and variable prandial BG ( 100s-200s )Discharge planning today . pt is interested in CGM use. Downloaded Dexcom G7 and applied sensor before discharge. the use of CGM reviewed   The importance of daily long acting insulin injection to prevent DKA discussed. she verbalized understanding     Met with patient and reviewed the following:    -Blood glucose goals: 100s to 150s as out pt  -Glucose monitoring frequency: ac and hs  -Hypoglycemia prevention,detection and treatment ( 15 : 15 rule)  -Healthy eating and portion control  -Insulin(s) action, time of administration and side effects ( insulin dose titration reviewed)   -Importance of follow up care  - CGM use reviewed   Pt able to give teach back with no assistance  Spent over 30 minutes providing face to face education.              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 C-peptide 1.2 in 2016 (no glucose), KARLI and islet cell antibodies negative  - HgbA1c >15%  - Home regimen: novolog 70/30 insulin 40 units in the AM, 30 units in the PM. Ran out 1 month ago  - eGFR: 135 mL/min/1.73m2 (12-19-24)  - Weight (kg): 58.1 (12-18-24)      INPATIENT PLAN:  - Increase Lantus  to 32 units daily in the morning  - Increase Admelog 7 units TID before meals (HOLD if NPO or not eating)  - Continue Low dose admelog correction scale TIDQAC and separate low dose scale QHS  - Please check FSG before meals and QHS, or q6h while NPO  - Inpatient glucose goals: 100-180  - consistent carb diet when eating  - followup Glutamic Acid Decarboxylase antibody, Zinc Transporter 8 Ab, Islet Antigen (IA-2) Antibody  - once improving, will plan to check c-peptide (send with BMP for glucose)   C-Peptide, Serum (12.19.24 @ 20:10)    C-Peptide, Serum: 0.3 ng/mL          DISCHARGE PLANNING:  - Recommend basal / bolus regimen on discharge   - Recommend Lantus/ Basaglar/ Tresiba 32 units in am   - Recommend Admelog/ Humalog/ Novolog 7 units with meals ( hold if not eating )   - Recommend low correction scale with meals ( correction 1:50 when BG > 150)   - Patient would benefit from CGM- please place order for CGM (Dexcom G7 or Freestyle Lucita 3 whichever is covered by patient insurance)- please send for reader plus sensor x3. Will supply patient with sample/set up ramon prior to discharge.   - Please send Rx for Glucometer, lancets, strips, alcohol pads  - pt to contact PCp or endocrinologist if BG < 70 X1, > 400 X1 or persistently > 180  - will need Endocrinology follow up. Please provide clinic info in DC paperwork for patient to make an appointment: End[facility]inology [facility]: [address] [place] [address]. [address]. [place] [address]. Tel: [phone]    #Hypertension  - Goal BP <130/80  - Management as per primary team  - check urine microalbumin level as outpatient    #Hyperlipidemia  - LDL goal <70  - check lipid panel as outpatient    Contact via Microsoft Teams during business hours  To reach covering provider access AMION via sunrise tools  For Urgent matters/after-hours/weekends/holidays please page endocrine fellow on call   For nonurgent matters please email [email or web address]    Please note that this patient may be followed by different provider tomorrow.  Notify endocrine 24 hours prior to discharge for final recommendations   
  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 Endocrinology consulted for DKA (high risk patient with severely uncontrolled diabetes with DKA and A1c of 14.8% at high risk of CAD and CVA with high medical complexity and high level decision-making).     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 C-peptide 1.2 in 2016 (no glucose), KARLI and islet cell antibodies negative  - HgbA1c >15%  - Home regimen: novolog 70/30 insulin 40 units in the AM, 30 units in the PM. Ran out 1 month ago  - eGFR: 135 mL/min/1.73m2 (12-19-24)  - Weight (kg): 58.1 (12-18-24)      INPATIENT PLAN:  - Recommend c/w Lantus 28 units daily in the morning  - Recommend c/w Admelog 6 units TID before meals (HOLD if NPO or not eating)  - Recommend c/w Low dose admelog correction scale TIDQAC and separate low dose scale QHS  - Please check FSG before meals and QHS, or q6h while NPO  - Inpatient glucose goals: 100-180  - consistent carb diet when eating  - followup Glutamic Acid Decarboxylase antibody, Zinc Transporter 8 Ab, Islet Antigen (IA-2) Antibody  - once improving, will plan to check c-peptide (send with BMP for glucose)      DISCHARGE PLANNING:  - Discharge recs pending clinical course: will need basal/bolus insulin (doses TBD)  - pt would benefit from CGM on discharge  - will need Endocrinology follow up. Please provide clinic info in DC paperwork for patient to make an appointment: [facility]: [address] [place] [address]. [address]. [place] [address]. Tel: [phone]    #Hypertension  - Goal BP <130/80  - Management as per primary team  - check urine microalbumin level as outpatient    #Hyperlipidemia  - LDL goal <70  - check lipid panel as outpatient    Gurjit Rey MD  Endocrine Fellow  For nonurgent matters (including consults or followup questions), please email [email or web address] or [email or web address]. For urgent matters, please call answering service at [phone] (weekdays), [phone] (nights/weekends). 
 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

## 2024-12-22 NOTE — DISCHARGE NOTE NURSING/CASE MANAGEMENT/SOCIAL WORK - PATIENT PORTAL LINK FT
You can access the FollowMyHealth Patient Portal offered by [facility] by registering at the following website: [email or web address]. By joining [facility]’s FollowMyHealth portal, you will also be able to view your health information using other applications (apps) compatible with our system.

## 2024-12-22 NOTE — DISCHARGE NOTE NURSING/CASE MANAGEMENT/SOCIAL WORK - NSDCFUADDAPPT_GEN_ALL_CORE_FT
APPTS ARE READY TO BE MADE: [X ] YES    Best Family or Patient Contact (if needed):    Additional Information about above appointments (if needed):    1: Endocrinology  2:   3:     Other comments or requests:

## 2024-12-22 NOTE — DISCHARGE NOTE NURSING/CASE MANAGEMENT/SOCIAL WORK - FINANCIAL ASSISTANCE
[facility] provides services at a reduced cost to those who are determined to be eligible through [facility]’s financial assistance program. Information regarding [facility]’s financial assistance program can be found by going to [email or web address] or by calling [phone].

## 2024-12-23 LAB
CULTURE RESULTS: SIGNIFICANT CHANGE UP
CULTURE RESULTS: SIGNIFICANT CHANGE UP
SPECIMEN SOURCE: SIGNIFICANT CHANGE UP
SPECIMEN SOURCE: SIGNIFICANT CHANGE UP

## 2024-12-23 RX ORDER — INSULIN LISPRO 100/ML
7 VIAL (ML) SUBCUTANEOUS
Qty: 1 | Refills: 0
Start: 2024-12-23 | End: 2025-03-22

## 2024-12-24 LAB
GAD65 AB SER-MCNC: 0 NMOL/L — SIGNIFICANT CHANGE UP
ISLET CELL512 AB SER-SCNC: 0 NMOL/L — SIGNIFICANT CHANGE UP

## 2024-12-25 ENCOUNTER — NON-APPOINTMENT (OUTPATIENT)
Age: 34
End: 2024-12-25

## 2024-12-26 ENCOUNTER — NON-APPOINTMENT (OUTPATIENT)
Age: 34
End: 2024-12-26

## 2024-12-26 ENCOUNTER — APPOINTMENT (OUTPATIENT)
Dept: OTOLARYNGOLOGY | Facility: CLINIC | Age: 34
End: 2024-12-26
Payer: MEDICAID

## 2024-12-26 DIAGNOSIS — H65.92 UNSPECIFIED NONSUPPURATIVE OTITIS MEDIA, LEFT EAR: ICD-10-CM

## 2024-12-26 DIAGNOSIS — H93.292 OTHER ABNORMAL AUDITORY PERCEPTIONS, LEFT EAR: ICD-10-CM

## 2024-12-26 DIAGNOSIS — H69.92 UNSPECIFIED EUSTACHIAN TUBE DISORDER, LEFT EAR: ICD-10-CM

## 2024-12-26 PROCEDURE — 92557 COMPREHENSIVE HEARING TEST: CPT

## 2024-12-26 PROCEDURE — 31231 NASAL ENDOSCOPY DX: CPT

## 2024-12-26 PROCEDURE — 99204 OFFICE O/P NEW MOD 45 MIN: CPT | Mod: 25

## 2024-12-26 PROCEDURE — 92567 TYMPANOMETRY: CPT

## 2024-12-27 PROBLEM — H93.292 OTHER ABNORMAL AUDITORY PERCEPTIONS, LEFT EAR: Status: ACTIVE | Noted: 2024-12-27

## 2024-12-27 PROBLEM — H65.92 FLUID LEVEL BEHIND TYMPANIC MEMBRANE OF LEFT EAR: Status: ACTIVE | Noted: 2024-12-26

## 2024-12-27 PROBLEM — H69.92 DYSFUNCTION OF LEFT EUSTACHIAN TUBE: Status: ACTIVE | Noted: 2024-12-27

## 2024-12-27 RX ORDER — FLUTICASONE PROPIONATE 50 UG/1
50 SPRAY, METERED NASAL TWICE DAILY
Qty: 1 | Refills: 6 | Status: ACTIVE | COMMUNITY
Start: 2024-12-27 | End: 1900-01-01

## 2024-12-27 RX ORDER — SOD CHLOR,BICARB/SQUEEZ BOTTLE
PACKET, WITH RINSE DEVICE NASAL
Qty: 100 | Refills: 1 | Status: ACTIVE | COMMUNITY
Start: 2024-12-27 | End: 1900-01-01

## 2025-01-07 LAB — ZINC TRANSPORTER 8 AB, RESULT: <15 U/ML — SIGNIFICANT CHANGE UP

## 2025-01-30 ENCOUNTER — APPOINTMENT (OUTPATIENT)
Dept: OBGYN | Facility: CLINIC | Age: 35
End: 2025-01-30
Payer: MEDICAID

## 2025-01-30 ENCOUNTER — NON-APPOINTMENT (OUTPATIENT)
Age: 35
End: 2025-01-30

## 2025-01-30 ENCOUNTER — LABORATORY RESULT (OUTPATIENT)
Age: 35
End: 2025-01-30

## 2025-01-30 ENCOUNTER — OUTPATIENT (OUTPATIENT)
Dept: OUTPATIENT SERVICES | Facility: HOSPITAL | Age: 35
LOS: 1 days | End: 2025-01-30
Payer: MEDICAID

## 2025-01-30 VITALS — WEIGHT: 145 LBS | SYSTOLIC BLOOD PRESSURE: 116 MMHG | BODY MASS INDEX: 25.69 KG/M2 | DIASTOLIC BLOOD PRESSURE: 78 MMHG

## 2025-01-30 DIAGNOSIS — Z01.419 ENCOUNTER FOR GYNECOLOGICAL EXAMINATION (GENERAL) (ROUTINE) W/OUT ABNORMAL FINDINGS: ICD-10-CM

## 2025-01-30 DIAGNOSIS — N76.0 ACUTE VAGINITIS: ICD-10-CM

## 2025-01-30 PROCEDURE — 99385 PREV VISIT NEW AGE 18-39: CPT | Mod: GC

## 2025-01-30 PROCEDURE — G0463: CPT

## 2025-01-30 PROCEDURE — 87624 HPV HI-RISK TYP POOLED RSLT: CPT

## 2025-01-31 ENCOUNTER — APPOINTMENT (OUTPATIENT)
Dept: ENDOCRINOLOGY | Facility: CLINIC | Age: 35
End: 2025-01-31

## 2025-01-31 VITALS
HEIGHT: 63 IN | DIASTOLIC BLOOD PRESSURE: 80 MMHG | OXYGEN SATURATION: 98 % | SYSTOLIC BLOOD PRESSURE: 144 MMHG | WEIGHT: 144 LBS | HEART RATE: 116 BPM | BODY MASS INDEX: 25.52 KG/M2

## 2025-01-31 DIAGNOSIS — E10.9 TYPE 1 DIABETES MELLITUS W/OUT COMPLICATIONS: ICD-10-CM

## 2025-01-31 LAB
GLUCOSE BLDC GLUCOMTR-MCNC: 119
HPV HIGH+LOW RISK DNA PNL CVX: SIGNIFICANT CHANGE UP

## 2025-01-31 PROCEDURE — 99215 OFFICE O/P EST HI 40 MIN: CPT | Mod: 25

## 2025-01-31 PROCEDURE — 82962 GLUCOSE BLOOD TEST: CPT

## 2025-01-31 PROCEDURE — 95251 CONT GLUC MNTR ANALYSIS I&R: CPT

## 2025-01-31 RX ORDER — INSULIN LISPRO 100 [IU]/ML
100 INJECTION, SOLUTION INTRAVENOUS; SUBCUTANEOUS
Qty: 2 | Refills: 3 | Status: ACTIVE | COMMUNITY
Start: 2025-01-31 | End: 1900-01-01

## 2025-02-01 ENCOUNTER — OFFICE (OUTPATIENT)
Facility: LOCATION | Age: 35
Setting detail: OPHTHALMOLOGY
End: 2025-02-01
Payer: COMMERCIAL

## 2025-02-01 DIAGNOSIS — H40.013: ICD-10-CM

## 2025-02-01 DIAGNOSIS — H25.13: ICD-10-CM

## 2025-02-01 DIAGNOSIS — E11.3313: ICD-10-CM

## 2025-02-01 LAB
ANION GAP SERPL CALC-SCNC: 10 MMOL/L
BUN SERPL-MCNC: 19 MG/DL
C PEPTIDE SERPL-MCNC: 1 NG/ML
CALCIUM SERPL-MCNC: 9.8 MG/DL
CHLORIDE SERPL-SCNC: 104 MMOL/L
CO2 SERPL-SCNC: 25 MMOL/L
CREAT SERPL-MCNC: 0.6 MG/DL
CREAT SPEC-SCNC: 137 MG/DL
EGFR: 121 ML/MIN/1.73M2
GLUCOSE SERPL-MCNC: 122 MG/DL
MICROALBUMIN 24H UR DL<=1MG/L-MCNC: 2.1 MG/DL
MICROALBUMIN/CREAT 24H UR-RTO: 16 MG/G
POTASSIUM SERPL-SCNC: 4.4 MMOL/L
SODIUM SERPL-SCNC: 139 MMOL/L

## 2025-02-01 PROCEDURE — 92014 COMPRE OPH EXAM EST PT 1/>: CPT | Performed by: OPHTHALMOLOGY

## 2025-02-01 PROCEDURE — 92134 CPTRZ OPH DX IMG PST SGM RTA: CPT | Performed by: OPHTHALMOLOGY

## 2025-02-01 ASSESSMENT — REFRACTION_AUTOREFRACTION
OS_AXIS: 005
OS_SPHERE: -7.00
OD_AXIS: 169
OD_SPHERE: -5.75
OD_CYLINDER: -1.75
OS_CYLINDER: -1.50

## 2025-02-01 ASSESSMENT — REFRACTION_CURRENTRX
OS_CYLINDER: -1.00
OD_AXIS: 170
OD_OVR_VA: 20/
OD_ADD: +0.50
OS_AXIS: 003
OS_ADD: +0.50
OD_SPHERE: -5.00
OD_CYLINDER: -1.50
OS_OVR_VA: 20/
OS_SPHERE: -5.25

## 2025-02-01 ASSESSMENT — REFRACTION_MANIFEST
OS_CYLINDER: -1.25
OS_AXIS: 180
OS_SPHERE: -6.25
OS_VA1: 20/50+1

## 2025-02-01 ASSESSMENT — KERATOMETRY
OD_AXISANGLE_DEGREES: 078
OS_K2POWER_DIOPTERS: 43.00
OS_AXISANGLE_DEGREES: 096
METHOD_AUTO_MANUAL: AUTO
OD_K1POWER_DIOPTERS: 40.75
OS_K1POWER_DIOPTERS: 40.75
OD_K2POWER_DIOPTERS: 42.75

## 2025-02-01 ASSESSMENT — VISUAL ACUITY
OD_BCVA: 20/60-1
OS_BCVA: 20/20-2

## 2025-02-01 ASSESSMENT — CONFRONTATIONAL VISUAL FIELD TEST (CVF)
OS_FINDINGS: FULL
OD_FINDINGS: FULL

## 2025-02-03 LAB — CYTOLOGY SPEC DOC CYTO: SIGNIFICANT CHANGE UP

## 2025-02-03 RX ORDER — BLOOD-GLUCOSE SENSOR
EACH MISCELLANEOUS
Qty: 9 | Refills: 3 | Status: ACTIVE | COMMUNITY
Start: 2025-01-31 | End: 1900-01-01

## 2025-02-06 DIAGNOSIS — Z01.419 ENCOUNTER FOR GYNECOLOGICAL EXAMINATION (GENERAL) (ROUTINE) WITHOUT ABNORMAL FINDINGS: ICD-10-CM

## 2025-02-11 ENCOUNTER — APPOINTMENT (OUTPATIENT)
Dept: INTERNAL MEDICINE | Facility: CLINIC | Age: 35
End: 2025-02-11
Payer: MEDICAID

## 2025-02-11 ENCOUNTER — OUTPATIENT (OUTPATIENT)
Dept: OUTPATIENT SERVICES | Facility: HOSPITAL | Age: 35
LOS: 1 days | End: 2025-02-11
Payer: MEDICAID

## 2025-02-11 VITALS
BODY MASS INDEX: 25.69 KG/M2 | HEART RATE: 117 BPM | SYSTOLIC BLOOD PRESSURE: 130 MMHG | OXYGEN SATURATION: 100 % | WEIGHT: 145 LBS | HEIGHT: 63 IN | DIASTOLIC BLOOD PRESSURE: 72 MMHG

## 2025-02-11 DIAGNOSIS — E10.9 TYPE 1 DIABETES MELLITUS W/OUT COMPLICATIONS: ICD-10-CM

## 2025-02-11 DIAGNOSIS — D64.9 ANEMIA, UNSPECIFIED: ICD-10-CM

## 2025-02-11 DIAGNOSIS — I10 ESSENTIAL (PRIMARY) HYPERTENSION: ICD-10-CM

## 2025-02-11 PROCEDURE — 84443 ASSAY THYROID STIM HORMONE: CPT

## 2025-02-11 PROCEDURE — 80053 COMPREHEN METABOLIC PANEL: CPT

## 2025-02-11 PROCEDURE — 83036 HEMOGLOBIN GLYCOSYLATED A1C: CPT

## 2025-02-11 PROCEDURE — 83540 ASSAY OF IRON: CPT

## 2025-02-11 PROCEDURE — 82728 ASSAY OF FERRITIN: CPT

## 2025-02-11 PROCEDURE — G2211 COMPLEX E/M VISIT ADD ON: CPT | Mod: NC

## 2025-02-11 PROCEDURE — G0463: CPT

## 2025-02-11 PROCEDURE — 99204 OFFICE O/P NEW MOD 45 MIN: CPT | Mod: GC

## 2025-02-11 PROCEDURE — 83550 IRON BINDING TEST: CPT

## 2025-02-11 PROCEDURE — 85027 COMPLETE CBC AUTOMATED: CPT

## 2025-02-12 LAB
ALBUMIN SERPL ELPH-MCNC: 4.5 G/DL
ALP BLD-CCNC: 56 U/L
ALT SERPL-CCNC: 11 U/L
ANION GAP SERPL CALC-SCNC: 12 MMOL/L
AST SERPL-CCNC: 14 U/L
BILIRUB SERPL-MCNC: 0.3 MG/DL
BUN SERPL-MCNC: 17 MG/DL
CALCIUM SERPL-MCNC: 9.1 MG/DL
CHLORIDE SERPL-SCNC: 107 MMOL/L
CO2 SERPL-SCNC: 20 MMOL/L
CREAT SERPL-MCNC: 0.48 MG/DL
EGFR: 127 ML/MIN/1.73M2
ESTIMATED AVERAGE GLUCOSE: 192 MG/DL
FERRITIN SERPL-MCNC: 6 NG/ML
GLUCOSE SERPL-MCNC: 79 MG/DL
HBA1C MFR BLD HPLC: 8.3 %
HCT VFR BLD CALC: 32.3 %
HGB BLD-MCNC: 9.8 G/DL
IRON SATN MFR SERPL: 7 %
IRON SERPL-MCNC: 33 UG/DL
MCHC RBC-ENTMCNC: 24.8 PG
MCHC RBC-ENTMCNC: 30.3 G/DL
MCV RBC AUTO: 81.8 FL
PLATELET # BLD AUTO: 500 K/UL
POTASSIUM SERPL-SCNC: 4 MMOL/L
PROT SERPL-MCNC: 7.7 G/DL
RBC # BLD: 3.95 M/UL
RBC # FLD: 14.6 %
SODIUM SERPL-SCNC: 139 MMOL/L
TIBC SERPL-MCNC: 472 UG/DL
TSH SERPL-ACNC: 1.38 UIU/ML
UIBC SERPL-MCNC: 439 UG/DL
WBC # FLD AUTO: 5.26 K/UL

## 2025-02-14 DIAGNOSIS — M62.838 OTHER MUSCLE SPASM: ICD-10-CM

## 2025-02-14 RX ORDER — BACLOFEN 5 MG/1
5 TABLET ORAL EVERY 8 HOURS
Qty: 15 | Refills: 0 | Status: ACTIVE | COMMUNITY
Start: 2025-02-14 | End: 1900-01-01

## 2025-02-24 DIAGNOSIS — E10.9 TYPE 1 DIABETES MELLITUS WITHOUT COMPLICATIONS: ICD-10-CM

## 2025-03-04 ENCOUNTER — OFFICE (OUTPATIENT)
Facility: LOCATION | Age: 35
Setting detail: OPHTHALMOLOGY
End: 2025-03-04
Payer: COMMERCIAL

## 2025-03-04 DIAGNOSIS — E11.3391: ICD-10-CM

## 2025-03-04 DIAGNOSIS — D31.01: ICD-10-CM

## 2025-03-04 DIAGNOSIS — E11.3212: ICD-10-CM

## 2025-03-04 DIAGNOSIS — H25.13: ICD-10-CM

## 2025-03-04 PROCEDURE — 67210 TREATMENT OF RETINAL LESION: CPT | Mod: LT | Performed by: OPHTHALMOLOGY

## 2025-03-04 PROCEDURE — 92235 FLUORESCEIN ANGRPH MLTIFRAME: CPT | Performed by: OPHTHALMOLOGY

## 2025-03-04 PROCEDURE — 92012 INTRM OPH EXAM EST PATIENT: CPT | Mod: 57 | Performed by: OPHTHALMOLOGY

## 2025-03-04 PROCEDURE — 92134 CPTRZ OPH DX IMG PST SGM RTA: CPT | Performed by: OPHTHALMOLOGY

## 2025-03-04 ASSESSMENT — REFRACTION_AUTOREFRACTION
OS_SPHERE: -7.00
OS_AXIS: 005
OS_CYLINDER: -1.50
OD_AXIS: 169
OD_CYLINDER: -1.75
OD_SPHERE: -5.75

## 2025-03-04 ASSESSMENT — REFRACTION_CURRENTRX
OS_CYLINDER: -1.00
OS_OVR_VA: 20/
OD_CYLINDER: -1.50
OS_AXIS: 003
OD_SPHERE: -5.00
OS_SPHERE: -5.25
OD_AXIS: 170
OS_ADD: +0.50
OD_OVR_VA: 20/
OD_ADD: +0.50

## 2025-03-04 ASSESSMENT — KERATOMETRY
OD_K2POWER_DIOPTERS: 42.75
OS_AXISANGLE_DEGREES: 096
OD_AXISANGLE_DEGREES: 078
OS_K2POWER_DIOPTERS: 43.00
OD_K1POWER_DIOPTERS: 40.75
METHOD_AUTO_MANUAL: AUTO
OS_K1POWER_DIOPTERS: 40.75

## 2025-03-04 ASSESSMENT — REFRACTION_MANIFEST
OS_AXIS: 180
OS_CYLINDER: -1.25
OS_SPHERE: -6.25
OS_VA1: 20/50+1

## 2025-03-04 ASSESSMENT — VISUAL ACUITY
OS_BCVA: 20/25
OD_BCVA: 20/100

## 2025-03-05 ENCOUNTER — APPOINTMENT (OUTPATIENT)
Dept: OTOLARYNGOLOGY | Facility: CLINIC | Age: 35
End: 2025-03-05
Payer: MEDICAID

## 2025-03-05 VITALS
HEART RATE: 108 BPM | WEIGHT: 145 LBS | DIASTOLIC BLOOD PRESSURE: 91 MMHG | SYSTOLIC BLOOD PRESSURE: 136 MMHG | HEIGHT: 63 IN | BODY MASS INDEX: 25.69 KG/M2

## 2025-03-05 DIAGNOSIS — H65.92 UNSPECIFIED NONSUPPURATIVE OTITIS MEDIA, LEFT EAR: ICD-10-CM

## 2025-03-05 DIAGNOSIS — M26.609 UNSPECIFIED TEMPOROMANDIBULAR JOINT DISORDER: ICD-10-CM

## 2025-03-05 DIAGNOSIS — H92.03 OTALGIA, BILATERAL: ICD-10-CM

## 2025-03-05 PROCEDURE — 99214 OFFICE O/P EST MOD 30 MIN: CPT

## 2025-03-12 PROBLEM — G47.00 INSOMNIA: Status: ACTIVE | Noted: 2025-03-12

## 2025-03-18 ENCOUNTER — APPOINTMENT (OUTPATIENT)
Dept: INTERNAL MEDICINE | Facility: CLINIC | Age: 35
End: 2025-03-18
Payer: MEDICAID

## 2025-03-18 DIAGNOSIS — E11.9 TYPE 2 DIABETES MELLITUS W/OUT COMPLICATIONS: ICD-10-CM

## 2025-03-18 DIAGNOSIS — D64.9 ANEMIA, UNSPECIFIED: ICD-10-CM

## 2025-03-18 DIAGNOSIS — G47.00 INSOMNIA, UNSPECIFIED: ICD-10-CM

## 2025-03-18 DIAGNOSIS — M54.9 DORSALGIA, UNSPECIFIED: ICD-10-CM

## 2025-03-18 PROCEDURE — 99214 OFFICE O/P EST MOD 30 MIN: CPT | Mod: 95

## 2025-03-18 RX ORDER — TRAZODONE HYDROCHLORIDE 100 MG/1
100 TABLET ORAL
Qty: 28 | Refills: 0 | Status: ACTIVE | COMMUNITY
Start: 2025-03-12 | End: 1900-01-01

## 2025-03-21 ENCOUNTER — APPOINTMENT (OUTPATIENT)
Dept: PHYSICAL MEDICINE AND REHAB | Facility: CLINIC | Age: 35
End: 2025-03-21

## 2025-03-24 ENCOUNTER — LABORATORY RESULT (OUTPATIENT)
Age: 35
End: 2025-03-24

## 2025-03-28 LAB
BASOPHILS # BLD AUTO: 0.04 K/UL
BASOPHILS NFR BLD AUTO: 0.9 %
EOSINOPHIL # BLD AUTO: 0.04 K/UL
EOSINOPHIL NFR BLD AUTO: 0.9 %
FERRITIN SERPL-MCNC: 14 NG/ML
FOLATE SERPL-MCNC: >20 NG/ML
HCT VFR BLD CALC: 34.1 %
HGB BLD-MCNC: 10.7 G/DL
IMM GRANULOCYTES NFR BLD AUTO: 0 %
IRON SATN MFR SERPL: 9 %
IRON SERPL-MCNC: 34 UG/DL
LYMPHOCYTES # BLD AUTO: 1.86 K/UL
LYMPHOCYTES NFR BLD AUTO: 41.8 %
MAN DIFF?: NORMAL
MCHC RBC-ENTMCNC: 25.6 PG
MCHC RBC-ENTMCNC: 31.4 G/DL
MCV RBC AUTO: 81.6 FL
MONOCYTES # BLD AUTO: 0.39 K/UL
MONOCYTES NFR BLD AUTO: 8.8 %
NEUTROPHILS # BLD AUTO: 2.12 K/UL
NEUTROPHILS NFR BLD AUTO: 47.6 %
PLATELET # BLD AUTO: 346 K/UL
RBC # BLD: 4.18 M/UL
RBC # FLD: 20.3 %
TIBC SERPL-MCNC: 394 UG/DL
UIBC SERPL-MCNC: 360 UG/DL
VIT B12 SERPL-MCNC: 415 PG/ML
WBC # FLD AUTO: 4.45 K/UL

## 2025-03-29 ENCOUNTER — APPOINTMENT (OUTPATIENT)
Dept: ORTHOPEDIC SURGERY | Facility: CLINIC | Age: 35
End: 2025-03-29

## 2025-03-29 ENCOUNTER — APPOINTMENT (OUTPATIENT)
Dept: ORTHOPEDIC SURGERY | Facility: CLINIC | Age: 35
End: 2025-03-29
Payer: MEDICAID

## 2025-03-29 VITALS
SYSTOLIC BLOOD PRESSURE: 117 MMHG | HEIGHT: 63 IN | DIASTOLIC BLOOD PRESSURE: 89 MMHG | HEART RATE: 91 BPM | BODY MASS INDEX: 25.69 KG/M2 | WEIGHT: 145 LBS

## 2025-03-29 DIAGNOSIS — M47.814 SPONDYLOSIS W/OUT MYELOPATHY OR RADICULOPATHY, THORACIC REGION: ICD-10-CM

## 2025-03-29 PROCEDURE — 72070 X-RAY EXAM THORAC SPINE 2VWS: CPT

## 2025-03-29 PROCEDURE — 99204 OFFICE O/P NEW MOD 45 MIN: CPT

## 2025-03-29 RX ORDER — DICLOFENAC SODIUM 50 MG/1
50 TABLET, DELAYED RELEASE ORAL TWICE DAILY
Qty: 60 | Refills: 0 | Status: ACTIVE | COMMUNITY
Start: 2025-03-29 | End: 1900-01-01

## 2025-04-14 ENCOUNTER — OUTPATIENT (OUTPATIENT)
Dept: OUTPATIENT SERVICES | Facility: HOSPITAL | Age: 35
LOS: 1 days | End: 2025-04-14
Payer: MEDICAID

## 2025-04-14 ENCOUNTER — APPOINTMENT (OUTPATIENT)
Dept: INTERNAL MEDICINE | Facility: CLINIC | Age: 35
End: 2025-04-14
Payer: MEDICAID

## 2025-04-14 ENCOUNTER — NON-APPOINTMENT (OUTPATIENT)
Age: 35
End: 2025-04-14

## 2025-04-14 VITALS
SYSTOLIC BLOOD PRESSURE: 110 MMHG | HEART RATE: 106 BPM | BODY MASS INDEX: 25.16 KG/M2 | OXYGEN SATURATION: 99 % | WEIGHT: 142 LBS | DIASTOLIC BLOOD PRESSURE: 76 MMHG | HEIGHT: 63 IN

## 2025-04-14 DIAGNOSIS — R00.2 PALPITATIONS: ICD-10-CM

## 2025-04-14 DIAGNOSIS — R00.0 TACHYCARDIA, UNSPECIFIED: ICD-10-CM

## 2025-04-14 DIAGNOSIS — I10 ESSENTIAL (PRIMARY) HYPERTENSION: ICD-10-CM

## 2025-04-14 PROCEDURE — G0463: CPT

## 2025-04-14 PROCEDURE — 99213 OFFICE O/P EST LOW 20 MIN: CPT | Mod: GE

## 2025-04-22 DIAGNOSIS — R00.0 TACHYCARDIA, UNSPECIFIED: ICD-10-CM

## 2025-04-22 DIAGNOSIS — R00.2 PALPITATIONS: ICD-10-CM

## 2025-04-30 ENCOUNTER — APPOINTMENT (OUTPATIENT)
Dept: CARDIOLOGY | Facility: HOSPITAL | Age: 35
End: 2025-04-30

## 2025-05-02 ENCOUNTER — APPOINTMENT (OUTPATIENT)
Dept: ENDOCRINOLOGY | Facility: CLINIC | Age: 35
End: 2025-05-02

## 2025-05-02 VITALS
SYSTOLIC BLOOD PRESSURE: 120 MMHG | OXYGEN SATURATION: 98 % | DIASTOLIC BLOOD PRESSURE: 82 MMHG | HEIGHT: 63 IN | HEART RATE: 112 BPM | WEIGHT: 146 LBS | BODY MASS INDEX: 25.87 KG/M2

## 2025-05-02 DIAGNOSIS — E10.9 TYPE 1 DIABETES MELLITUS W/OUT COMPLICATIONS: ICD-10-CM

## 2025-05-02 LAB — HBA1C MFR BLD HPLC: 5.7

## 2025-05-02 PROCEDURE — 99214 OFFICE O/P EST MOD 30 MIN: CPT | Mod: 25

## 2025-05-02 PROCEDURE — 83036 HEMOGLOBIN GLYCOSYLATED A1C: CPT | Mod: QW

## 2025-05-02 PROCEDURE — 95251 CONT GLUC MNTR ANALYSIS I&R: CPT

## 2025-05-02 RX ORDER — GLUCAGON INJECTION, SOLUTION 1 MG/.2ML
1 INJECTION, SOLUTION SUBCUTANEOUS
Qty: 1 | Refills: 1 | Status: ACTIVE | COMMUNITY
Start: 2025-05-02 | End: 1900-01-01

## 2025-05-08 ENCOUNTER — OUTPATIENT (OUTPATIENT)
Dept: OUTPATIENT SERVICES | Facility: HOSPITAL | Age: 35
LOS: 1 days | End: 2025-05-08
Payer: MEDICAID

## 2025-05-08 ENCOUNTER — NON-APPOINTMENT (OUTPATIENT)
Age: 35
End: 2025-05-08

## 2025-05-08 ENCOUNTER — APPOINTMENT (OUTPATIENT)
Dept: CARDIOLOGY | Facility: HOSPITAL | Age: 35
End: 2025-05-08

## 2025-05-08 ENCOUNTER — APPOINTMENT (OUTPATIENT)
Dept: ELECTROPHYSIOLOGY | Facility: CLINIC | Age: 35
End: 2025-05-08

## 2025-05-08 VITALS
BODY MASS INDEX: 25.69 KG/M2 | DIASTOLIC BLOOD PRESSURE: 82 MMHG | HEART RATE: 95 BPM | SYSTOLIC BLOOD PRESSURE: 127 MMHG | OXYGEN SATURATION: 98 % | WEIGHT: 145 LBS

## 2025-05-08 DIAGNOSIS — D64.9 ANEMIA, UNSPECIFIED: ICD-10-CM

## 2025-05-08 DIAGNOSIS — I25.10 ATHEROSCLEROTIC HEART DISEASE OF NATIVE CORONARY ARTERY WITHOUT ANGINA PECTORIS: ICD-10-CM

## 2025-05-08 DIAGNOSIS — R00.0 TACHYCARDIA, UNSPECIFIED: ICD-10-CM

## 2025-05-08 PROCEDURE — 93005 ELECTROCARDIOGRAM TRACING: CPT

## 2025-05-08 PROCEDURE — G0463: CPT

## 2025-05-09 DIAGNOSIS — D64.9 ANEMIA, UNSPECIFIED: ICD-10-CM

## 2025-05-09 DIAGNOSIS — R00.0 TACHYCARDIA, UNSPECIFIED: ICD-10-CM

## 2025-05-17 ENCOUNTER — APPOINTMENT (OUTPATIENT)
Dept: CARDIOLOGY | Facility: CLINIC | Age: 35
End: 2025-05-17
Payer: MEDICAID

## 2025-05-17 PROCEDURE — 93306 TTE W/DOPPLER COMPLETE: CPT

## 2025-05-27 ENCOUNTER — APPOINTMENT (OUTPATIENT)
Dept: INTERNAL MEDICINE | Facility: CLINIC | Age: 35
End: 2025-05-27
Payer: MEDICAID

## 2025-05-27 ENCOUNTER — OUTPATIENT (OUTPATIENT)
Dept: OUTPATIENT SERVICES | Facility: HOSPITAL | Age: 35
LOS: 1 days | End: 2025-05-27
Payer: MEDICAID

## 2025-05-27 VITALS
HEIGHT: 63 IN | DIASTOLIC BLOOD PRESSURE: 78 MMHG | HEART RATE: 107 BPM | WEIGHT: 143.5 LBS | SYSTOLIC BLOOD PRESSURE: 98 MMHG | OXYGEN SATURATION: 98 % | BODY MASS INDEX: 25.43 KG/M2

## 2025-05-27 DIAGNOSIS — E10.9 TYPE 1 DIABETES MELLITUS W/OUT COMPLICATIONS: ICD-10-CM

## 2025-05-27 DIAGNOSIS — R00.0 TACHYCARDIA, UNSPECIFIED: ICD-10-CM

## 2025-05-27 DIAGNOSIS — Z23 ENCOUNTER FOR IMMUNIZATION: ICD-10-CM

## 2025-05-27 PROCEDURE — G0463: CPT | Mod: 25

## 2025-05-27 PROCEDURE — 99395 PREV VISIT EST AGE 18-39: CPT | Mod: 25,GC

## 2025-05-27 PROCEDURE — 90471 IMMUNIZATION ADMIN: CPT

## 2025-05-27 PROCEDURE — 90715 TDAP VACCINE 7 YRS/> IM: CPT

## 2025-05-27 RX ORDER — DICLOFENAC SODIUM 10 MG/G
1 GEL TOPICAL DAILY
Qty: 1 | Refills: 1 | Status: ACTIVE | COMMUNITY
Start: 2025-05-27 | End: 1900-01-01

## 2025-05-29 PROCEDURE — 93248 EXT ECG>7D<15D REV&INTERPJ: CPT

## 2025-06-03 ENCOUNTER — APPOINTMENT (OUTPATIENT)
Dept: RHEUMATOLOGY | Facility: CLINIC | Age: 35
End: 2025-06-03
Payer: MEDICAID

## 2025-06-03 VITALS
TEMPERATURE: 97.9 F | SYSTOLIC BLOOD PRESSURE: 106 MMHG | HEIGHT: 63 IN | WEIGHT: 145 LBS | HEART RATE: 89 BPM | DIASTOLIC BLOOD PRESSURE: 68 MMHG | OXYGEN SATURATION: 99 % | BODY MASS INDEX: 25.69 KG/M2

## 2025-06-03 DIAGNOSIS — M62.838 OTHER MUSCLE SPASM: ICD-10-CM

## 2025-06-03 DIAGNOSIS — M47.814 SPONDYLOSIS W/OUT MYELOPATHY OR RADICULOPATHY, THORACIC REGION: ICD-10-CM

## 2025-06-03 LAB
ALBUMIN SERPL ELPH-MCNC: 4.1 G/DL
ALP BLD-CCNC: 51 U/L
ALT SERPL-CCNC: 22 U/L
ANION GAP SERPL CALC-SCNC: 12 MMOL/L
AST SERPL-CCNC: 17 U/L
BILIRUB SERPL-MCNC: 0.4 MG/DL
BUN SERPL-MCNC: 15 MG/DL
CALCIUM SERPL-MCNC: 9 MG/DL
CCP AB SER IA-ACNC: <8 U/ML
CHLORIDE SERPL-SCNC: 106 MMOL/L
CO2 SERPL-SCNC: 20 MMOL/L
CREAT SERPL-MCNC: 0.55 MG/DL
CRP SERPL-MCNC: <3 MG/L
EGFRCR SERPLBLD CKD-EPI 2021: 123 ML/MIN/1.73M2
ERYTHROCYTE [SEDIMENTATION RATE] IN BLOOD BY WESTERGREN METHOD: 20 MM/HR
GLUCOSE SERPL-MCNC: 65 MG/DL
POTASSIUM SERPL-SCNC: 4.3 MMOL/L
PROT SERPL-MCNC: 7.2 G/DL
RF+CCP IGG SER-IMP: NEGATIVE
RHEUMATOID FACT SER QL: <10 IU/ML
SODIUM SERPL-SCNC: 139 MMOL/L

## 2025-06-03 PROCEDURE — 99204 OFFICE O/P NEW MOD 45 MIN: CPT

## 2025-06-06 ENCOUNTER — NON-APPOINTMENT (OUTPATIENT)
Age: 35
End: 2025-06-06

## 2025-06-11 ENCOUNTER — APPOINTMENT (OUTPATIENT)
Dept: ORTHOPEDIC SURGERY | Facility: CLINIC | Age: 35
End: 2025-06-11
Payer: MEDICAID

## 2025-06-11 VITALS — WEIGHT: 145 LBS | HEIGHT: 63 IN | BODY MASS INDEX: 25.69 KG/M2

## 2025-06-11 PROCEDURE — 99204 OFFICE O/P NEW MOD 45 MIN: CPT

## 2025-06-11 RX ORDER — TIZANIDINE 2 MG/1
2 TABLET ORAL EVERY 6 HOURS
Qty: 56 | Refills: 1 | Status: ACTIVE | COMMUNITY
Start: 2025-06-11 | End: 1900-01-01

## 2025-06-12 ENCOUNTER — APPOINTMENT (OUTPATIENT)
Dept: CARDIOLOGY | Facility: HOSPITAL | Age: 35
End: 2025-06-12

## 2025-06-17 ENCOUNTER — APPOINTMENT (OUTPATIENT)
Dept: ENDOCRINOLOGY | Facility: CLINIC | Age: 35
End: 2025-06-17

## 2025-06-20 ENCOUNTER — NON-APPOINTMENT (OUTPATIENT)
Age: 35
End: 2025-06-20

## 2025-06-21 ENCOUNTER — APPOINTMENT (OUTPATIENT)
Dept: MRI IMAGING | Facility: CLINIC | Age: 35
End: 2025-06-21

## 2025-06-26 ENCOUNTER — NON-APPOINTMENT (OUTPATIENT)
Age: 35
End: 2025-06-26

## 2025-06-26 ENCOUNTER — OUTPATIENT (OUTPATIENT)
Dept: OUTPATIENT SERVICES | Facility: HOSPITAL | Age: 35
LOS: 1 days | End: 2025-06-26
Payer: MEDICAID

## 2025-06-26 ENCOUNTER — APPOINTMENT (OUTPATIENT)
Dept: OBGYN | Facility: CLINIC | Age: 35
End: 2025-06-26
Payer: MEDICAID

## 2025-06-26 VITALS — SYSTOLIC BLOOD PRESSURE: 110 MMHG | BODY MASS INDEX: 27.1 KG/M2 | DIASTOLIC BLOOD PRESSURE: 78 MMHG | WEIGHT: 153 LBS

## 2025-06-26 DIAGNOSIS — N76.0 ACUTE VAGINITIS: ICD-10-CM

## 2025-06-26 PROBLEM — Z34.90 INTRAUTERINE PREGNANCY: Status: ACTIVE | Noted: 2025-06-26

## 2025-06-26 PROCEDURE — 99213 OFFICE O/P EST LOW 20 MIN: CPT | Mod: TH,25,GC

## 2025-06-26 PROCEDURE — G0463: CPT

## 2025-06-30 ENCOUNTER — APPOINTMENT (OUTPATIENT)
Dept: MATERNAL FETAL MEDICINE | Facility: CLINIC | Age: 35
End: 2025-06-30

## 2025-06-30 PROCEDURE — G0108 DIAB MANAGE TRN  PER INDIV: CPT | Mod: 95

## 2025-06-30 RX ORDER — URINE GLUCOSE-ACET TEST STRIP
STRIP MISCELLANEOUS
Qty: 1 | Refills: 0 | Status: ACTIVE | COMMUNITY
Start: 2025-06-30 | End: 1900-01-01

## 2025-07-01 ENCOUNTER — OUTPATIENT (OUTPATIENT)
Dept: OUTPATIENT SERVICES | Facility: HOSPITAL | Age: 35
LOS: 1 days | End: 2025-07-01
Payer: MEDICAID

## 2025-07-01 ENCOUNTER — OFFICE (OUTPATIENT)
Facility: LOCATION | Age: 35
Setting detail: OPHTHALMOLOGY
End: 2025-07-01
Payer: COMMERCIAL

## 2025-07-01 ENCOUNTER — APPOINTMENT (OUTPATIENT)
Dept: CV DIAGNOSTICS | Facility: HOSPITAL | Age: 35
End: 2025-07-01

## 2025-07-01 ENCOUNTER — RESULT REVIEW (OUTPATIENT)
Age: 35
End: 2025-07-01

## 2025-07-01 ENCOUNTER — APPOINTMENT (OUTPATIENT)
Dept: RHEUMATOLOGY | Facility: CLINIC | Age: 35
End: 2025-07-01

## 2025-07-01 DIAGNOSIS — D64.9 ANEMIA, UNSPECIFIED: ICD-10-CM

## 2025-07-01 DIAGNOSIS — E11.3212: ICD-10-CM

## 2025-07-01 DIAGNOSIS — R00.0 TACHYCARDIA, UNSPECIFIED: ICD-10-CM

## 2025-07-01 DIAGNOSIS — E11.3391: ICD-10-CM

## 2025-07-01 LAB — GLUCOSE BLDC GLUCOMTR-MCNC: 82 MG/DL — SIGNIFICANT CHANGE UP (ref 70–99)

## 2025-07-01 PROCEDURE — 92012 INTRM OPH EXAM EST PATIENT: CPT | Mod: 57 | Performed by: OPHTHALMOLOGY

## 2025-07-01 PROCEDURE — 92134 CPTRZ OPH DX IMG PST SGM RTA: CPT | Performed by: OPHTHALMOLOGY

## 2025-07-01 PROCEDURE — 93017 CV STRESS TEST TRACING ONLY: CPT

## 2025-07-01 PROCEDURE — 93016 CV STRESS TEST SUPVJ ONLY: CPT

## 2025-07-01 PROCEDURE — 67210 TREATMENT OF RETINAL LESION: CPT | Mod: LT | Performed by: OPHTHALMOLOGY

## 2025-07-01 PROCEDURE — 93018 CV STRESS TEST I&R ONLY: CPT

## 2025-07-01 PROCEDURE — 82962 GLUCOSE BLOOD TEST: CPT

## 2025-07-01 ASSESSMENT — REFRACTION_AUTOREFRACTION
OS_SPHERE: -7.00
OS_CYLINDER: -1.50
OD_AXIS: 169
OD_SPHERE: -5.75
OD_CYLINDER: -1.75
OS_AXIS: 005

## 2025-07-01 ASSESSMENT — KERATOMETRY
OD_K2POWER_DIOPTERS: 42.75
OS_K1POWER_DIOPTERS: 40.75
OD_AXISANGLE_DEGREES: 078
OS_AXISANGLE_DEGREES: 096
OS_K2POWER_DIOPTERS: 43.00
METHOD_AUTO_MANUAL: AUTO
OD_K1POWER_DIOPTERS: 40.75

## 2025-07-01 ASSESSMENT — REFRACTION_CURRENTRX
OS_SPHERE: -5.25
OD_AXIS: 170
OD_CYLINDER: -1.50
OD_SPHERE: -5.00
OD_OVR_VA: 20/
OS_CYLINDER: -1.00
OD_ADD: +0.50
OS_AXIS: 003
OS_OVR_VA: 20/
OS_ADD: +0.50

## 2025-07-01 ASSESSMENT — REFRACTION_MANIFEST
OS_SPHERE: -6.25
OS_AXIS: 180
OS_VA1: 20/50+1
OS_CYLINDER: -1.25

## 2025-07-01 ASSESSMENT — VISUAL ACUITY
OS_BCVA: 20/20-2
OD_BCVA: 20/25-

## 2025-07-09 DIAGNOSIS — I10 ESSENTIAL (PRIMARY) HYPERTENSION: ICD-10-CM

## 2025-07-10 ENCOUNTER — APPOINTMENT (OUTPATIENT)
Dept: CARDIOLOGY | Facility: HOSPITAL | Age: 35
End: 2025-07-10

## 2025-07-10 ENCOUNTER — OUTPATIENT (OUTPATIENT)
Dept: OUTPATIENT SERVICES | Facility: HOSPITAL | Age: 35
LOS: 1 days | End: 2025-07-10
Payer: MEDICAID

## 2025-07-10 ENCOUNTER — NON-APPOINTMENT (OUTPATIENT)
Age: 35
End: 2025-07-10

## 2025-07-10 VITALS
WEIGHT: 145 LBS | BODY MASS INDEX: 25.69 KG/M2 | HEART RATE: 93 BPM | OXYGEN SATURATION: 100 % | SYSTOLIC BLOOD PRESSURE: 106 MMHG | DIASTOLIC BLOOD PRESSURE: 72 MMHG

## 2025-07-10 DIAGNOSIS — Z34.90 ENCOUNTER FOR SUPERVISION OF NORMAL PREGNANCY, UNSPECIFIED, UNSPECIFIED TRIMESTER: ICD-10-CM

## 2025-07-10 DIAGNOSIS — I25.10 ATHEROSCLEROTIC HEART DISEASE OF NATIVE CORONARY ARTERY WITHOUT ANGINA PECTORIS: ICD-10-CM

## 2025-07-10 DIAGNOSIS — R00.0 TACHYCARDIA, UNSPECIFIED: ICD-10-CM

## 2025-07-10 DIAGNOSIS — E10.9 TYPE 1 DIABETES MELLITUS WITHOUT COMPLICATIONS: ICD-10-CM

## 2025-07-10 DIAGNOSIS — Z23 ENCOUNTER FOR IMMUNIZATION: ICD-10-CM

## 2025-07-10 DIAGNOSIS — Z00.00 ENCOUNTER FOR GENERAL ADULT MEDICAL EXAMINATION WITHOUT ABNORMAL FINDINGS: ICD-10-CM

## 2025-07-10 PROCEDURE — G0463: CPT

## 2025-07-10 PROCEDURE — 93005 ELECTROCARDIOGRAM TRACING: CPT

## 2025-07-11 ENCOUNTER — LABORATORY RESULT (OUTPATIENT)
Age: 35
End: 2025-07-11

## 2025-07-11 ENCOUNTER — OUTPATIENT (OUTPATIENT)
Dept: OUTPATIENT SERVICES | Facility: HOSPITAL | Age: 35
LOS: 1 days | End: 2025-07-11
Payer: MEDICAID

## 2025-07-11 ENCOUNTER — APPOINTMENT (OUTPATIENT)
Dept: MATERNAL FETAL MEDICINE | Facility: CLINIC | Age: 35
End: 2025-07-11
Payer: MEDICAID

## 2025-07-11 ENCOUNTER — APPOINTMENT (OUTPATIENT)
Dept: ANTEPARTUM | Facility: CLINIC | Age: 35
End: 2025-07-11
Payer: MEDICAID

## 2025-07-11 ENCOUNTER — NON-APPOINTMENT (OUTPATIENT)
Age: 35
End: 2025-07-11

## 2025-07-11 VITALS
DIASTOLIC BLOOD PRESSURE: 70 MMHG | HEIGHT: 63 IN | WEIGHT: 144.13 LBS | HEART RATE: 95 BPM | SYSTOLIC BLOOD PRESSURE: 100 MMHG | OXYGEN SATURATION: 98 % | BODY MASS INDEX: 25.54 KG/M2

## 2025-07-11 DIAGNOSIS — R00.2 PALPITATIONS: ICD-10-CM

## 2025-07-11 DIAGNOSIS — O09.899 SUPERVISION OF OTHER HIGH RISK PREGNANCIES, UNSPECIFIED TRIMESTER: ICD-10-CM

## 2025-07-11 DIAGNOSIS — D64.9 ANEMIA, UNSPECIFIED: ICD-10-CM

## 2025-07-11 LAB
A1C WITH ESTIMATED AVERAGE GLUCOSE RESULT: 5.4 % — SIGNIFICANT CHANGE UP (ref 4–5.6)
ALBUMIN SERPL ELPH-MCNC: 4.3 G/DL — SIGNIFICANT CHANGE UP (ref 3.3–5)
ALP SERPL-CCNC: 47 U/L — SIGNIFICANT CHANGE UP (ref 40–120)
ALT FLD-CCNC: 16 U/L — SIGNIFICANT CHANGE UP (ref 10–40)
ANION GAP SERPL CALC-SCNC: 13 MMOL/L — SIGNIFICANT CHANGE UP (ref 5–17)
AST SERPL-CCNC: 16 U/L — SIGNIFICANT CHANGE UP (ref 10–35)
BILIRUB SERPL-MCNC: 0.2 MG/DL — SIGNIFICANT CHANGE UP (ref 0.2–1.2)
BILIRUB UR QL STRIP: NEGATIVE
BUN SERPL-MCNC: 13 MG/DL — SIGNIFICANT CHANGE UP (ref 7–23)
CALCIUM SERPL-MCNC: 9.4 MG/DL — SIGNIFICANT CHANGE UP (ref 8.4–10.5)
CHLORIDE SERPL-SCNC: 106 MMOL/L — SIGNIFICANT CHANGE UP (ref 96–108)
CO2 SERPL-SCNC: 19 MMOL/L — LOW (ref 22–31)
CREAT ?TM UR-MCNC: 54 MG/DL — SIGNIFICANT CHANGE UP
CREAT SERPL-MCNC: 0.44 MG/DL — LOW (ref 0.5–1.3)
EGFR: 130 ML/MIN/1.73M2 — SIGNIFICANT CHANGE UP
EGFR: 130 ML/MIN/1.73M2 — SIGNIFICANT CHANGE UP
ESTIMATED AVERAGE GLUCOSE: 108 MG/DL — SIGNIFICANT CHANGE UP (ref 68–114)
GLUCOSE SERPL-MCNC: 78 MG/DL — SIGNIFICANT CHANGE UP (ref 70–99)
GLUCOSE UR-MCNC: NEGATIVE
HBV SURFACE AG SER-ACNC: SIGNIFICANT CHANGE UP
HCG UR QL: 0.2 EU/DL
HCT VFR BLD CALC: 34.3 % — LOW (ref 34.5–45)
HCV AB S/CO SERPL IA: 0.2 S/CO — SIGNIFICANT CHANGE UP (ref 0–0.79)
HCV AB SERPL-IMP: SIGNIFICANT CHANGE UP
HGB BLD-MCNC: 11.1 G/DL — LOW (ref 11.5–15.5)
HGB UR QL STRIP.AUTO: NEGATIVE
HIV 1+2 AB+HIV1 P24 AG SERPL QL IA: SIGNIFICANT CHANGE UP
KETONES UR-MCNC: NEGATIVE
LDH SERPL L TO P-CCNC: 161 U/L — SIGNIFICANT CHANGE UP (ref 50–242)
LEUKOCYTE ESTERASE UR QL STRIP: NEGATIVE
MCHC RBC-ENTMCNC: 29.7 PG — SIGNIFICANT CHANGE UP (ref 27–34)
MCHC RBC-ENTMCNC: 32.9 G/DL — SIGNIFICANT CHANGE UP (ref 32–36)
MCV RBC AUTO: 89.9 FL — SIGNIFICANT CHANGE UP (ref 80–100)
MEV IGG SER-ACNC: 194 AU/ML — SIGNIFICANT CHANGE UP
MEV IGG+IGM SER-IMP: POSITIVE — SIGNIFICANT CHANGE UP
NITRITE UR QL STRIP: NEGATIVE
PH UR STRIP: 6.5
PLATELET # BLD AUTO: 348 K/UL — SIGNIFICANT CHANGE UP (ref 150–400)
POTASSIUM SERPL-MCNC: 4.5 MMOL/L — SIGNIFICANT CHANGE UP (ref 3.5–5.3)
POTASSIUM SERPL-SCNC: 4.5 MMOL/L — SIGNIFICANT CHANGE UP (ref 3.5–5.3)
PROT ?TM UR-MCNC: 14 MG/DL — HIGH (ref 0–12)
PROT SERPL-MCNC: 7.4 G/DL — SIGNIFICANT CHANGE UP (ref 6–8.3)
PROT UR STRIP-MCNC: NEGATIVE
PROT/CREAT UR-RTO: 0.3 RATIO — HIGH (ref 0–0.2)
RBC # BLD: 3.78 M/UL — LOW (ref 3.8–5.2)
RBC # FLD: 13.7 % — SIGNIFICANT CHANGE UP (ref 10.3–14.5)
RUBV IGG SER-ACNC: 2.52 INDEX — SIGNIFICANT CHANGE UP
RUBV IGG SER-IMP: POSITIVE — SIGNIFICANT CHANGE UP
SODIUM SERPL-SCNC: 137 MMOL/L — SIGNIFICANT CHANGE UP (ref 135–145)
SP GR UR STRIP: 1.02
T PALLIDUM AB TITR SER: NEGATIVE — SIGNIFICANT CHANGE UP
TSH SERPL-MCNC: 0.71 UIU/ML — SIGNIFICANT CHANGE UP (ref 0.27–4.2)
URATE SERPL-MCNC: 2.9 MG/DL — SIGNIFICANT CHANGE UP (ref 2.5–7)
VZV IGG FLD QL IA: 14.4 S/CO — SIGNIFICANT CHANGE UP
VZV IGG FLD QL IA: POSITIVE — SIGNIFICANT CHANGE UP
WBC # BLD: 8.69 K/UL — SIGNIFICANT CHANGE UP (ref 3.8–10.5)
WBC # FLD AUTO: 8.69 K/UL — SIGNIFICANT CHANGE UP (ref 3.8–10.5)

## 2025-07-11 PROCEDURE — 86803 HEPATITIS C AB TEST: CPT

## 2025-07-11 PROCEDURE — 87591 N.GONORRHOEAE DNA AMP PROB: CPT

## 2025-07-11 PROCEDURE — G0108 DIAB MANAGE TRN  PER INDIV: CPT

## 2025-07-11 PROCEDURE — 83615 LACTATE (LD) (LDH) ENZYME: CPT

## 2025-07-11 PROCEDURE — G0108: CPT

## 2025-07-11 PROCEDURE — 83655 ASSAY OF LEAD: CPT

## 2025-07-11 PROCEDURE — 82570 ASSAY OF URINE CREATININE: CPT

## 2025-07-11 PROCEDURE — 87389 HIV-1 AG W/HIV-1&-2 AB AG IA: CPT

## 2025-07-11 PROCEDURE — 80053 COMPREHEN METABOLIC PANEL: CPT

## 2025-07-11 PROCEDURE — 86765 RUBEOLA ANTIBODY: CPT

## 2025-07-11 PROCEDURE — 83036 HEMOGLOBIN GLYCOSYLATED A1C: CPT

## 2025-07-11 PROCEDURE — 36415 COLL VENOUS BLD VENIPUNCTURE: CPT

## 2025-07-11 PROCEDURE — 86901 BLOOD TYPING SEROLOGIC RH(D): CPT

## 2025-07-11 PROCEDURE — 85027 COMPLETE CBC AUTOMATED: CPT

## 2025-07-11 PROCEDURE — 76801 OB US < 14 WKS SINGLE FETUS: CPT

## 2025-07-11 PROCEDURE — 86850 RBC ANTIBODY SCREEN: CPT

## 2025-07-11 PROCEDURE — 86787 VARICELLA-ZOSTER ANTIBODY: CPT

## 2025-07-11 PROCEDURE — 84156 ASSAY OF PROTEIN URINE: CPT

## 2025-07-11 PROCEDURE — 87077 CULTURE AEROBIC IDENTIFY: CPT

## 2025-07-11 PROCEDURE — 83020 HEMOGLOBIN ELECTROPHORESIS: CPT

## 2025-07-11 PROCEDURE — 86780 TREPONEMA PALLIDUM: CPT

## 2025-07-11 PROCEDURE — 84443 ASSAY THYROID STIM HORMONE: CPT

## 2025-07-11 PROCEDURE — 87491 CHLMYD TRACH DNA AMP PROBE: CPT

## 2025-07-11 PROCEDURE — 86480 TB TEST CELL IMMUN MEASURE: CPT

## 2025-07-11 PROCEDURE — 81003 URINALYSIS AUTO W/O SCOPE: CPT

## 2025-07-11 PROCEDURE — 86762 RUBELLA ANTIBODY: CPT

## 2025-07-11 PROCEDURE — 84550 ASSAY OF BLOOD/URIC ACID: CPT

## 2025-07-11 PROCEDURE — 99204 OFFICE O/P NEW MOD 45 MIN: CPT | Mod: TH,GC,25

## 2025-07-11 PROCEDURE — 83020 HEMOGLOBIN ELECTROPHORESIS: CPT | Mod: 26

## 2025-07-11 PROCEDURE — 87086 URINE CULTURE/COLONY COUNT: CPT

## 2025-07-11 PROCEDURE — 86900 BLOOD TYPING SEROLOGIC ABO: CPT

## 2025-07-11 PROCEDURE — 87340 HEPATITIS B SURFACE AG IA: CPT

## 2025-07-11 PROCEDURE — G0463: CPT

## 2025-07-12 LAB
C TRACH RRNA SPEC QL NAA+PROBE: SIGNIFICANT CHANGE UP
LEAD BLD-MCNC: <1 UG/DL — SIGNIFICANT CHANGE UP (ref 0–3.4)
N GONORRHOEA RRNA SPEC QL NAA+PROBE: SIGNIFICANT CHANGE UP
SPECIMEN SOURCE: SIGNIFICANT CHANGE UP

## 2025-07-14 ENCOUNTER — APPOINTMENT (OUTPATIENT)
Dept: ORTHOPEDIC SURGERY | Facility: CLINIC | Age: 35
End: 2025-07-14
Payer: MEDICAID

## 2025-07-14 DIAGNOSIS — O09.90 SUPERVISION OF HIGH RISK PREGNANCY, UNSPECIFIED, UNSPECIFIED TRIMESTER: ICD-10-CM

## 2025-07-14 DIAGNOSIS — E25.0 CONGENITAL ADRENOGENITAL DISORDERS ASSOCIATED WITH ENZYME DEFICIENCY: ICD-10-CM

## 2025-07-14 LAB
BLD GP AB SCN SERPL QL: SIGNIFICANT CHANGE UP
CULTURE RESULTS: ABNORMAL
HEMOGLOBIN INTERPRETATION: SIGNIFICANT CHANGE UP
HGB A MFR BLD: 97.2 % — SIGNIFICANT CHANGE UP (ref 95.8–98)
HGB A2 MFR BLD: 2.8 % — SIGNIFICANT CHANGE UP (ref 2–3.2)
SPECIMEN SOURCE: SIGNIFICANT CHANGE UP

## 2025-07-14 PROCEDURE — 99214 OFFICE O/P EST MOD 30 MIN: CPT

## 2025-07-15 LAB
GAMMA INTERFERON BACKGROUND BLD IA-ACNC: 0.03 IU/ML — SIGNIFICANT CHANGE UP
M TB IFN-G BLD-IMP: NEGATIVE — SIGNIFICANT CHANGE UP
M TB IFN-G CD4+ BCKGRND COR BLD-ACNC: 0 IU/ML — SIGNIFICANT CHANGE UP
M TB IFN-G CD4+CD8+ BCKGRND COR BLD-ACNC: 0 IU/ML — SIGNIFICANT CHANGE UP
QUANT TB PLUS MITOGEN MINUS NIL: >10 IU/ML — SIGNIFICANT CHANGE UP

## 2025-07-15 RX ORDER — INSULIN PMP CART,AUT,G6/7,CNTR
EACH SUBCUTANEOUS
Qty: 1 | Refills: 0 | Status: ACTIVE | COMMUNITY
Start: 2025-07-11 | End: 1900-01-01

## 2025-07-15 RX ORDER — BLOOD-GLUCOSE METER
KIT MISCELLANEOUS
Qty: 3 | Refills: 2 | Status: ACTIVE | COMMUNITY
Start: 2025-07-11 | End: 1900-01-01

## 2025-07-15 RX ORDER — BLOOD-GLUCOSE METER
W/DEVICE KIT MISCELLANEOUS
Qty: 1 | Refills: 0 | Status: ACTIVE | COMMUNITY
Start: 2025-07-11 | End: 1900-01-01

## 2025-07-15 RX ORDER — INSULIN LISPRO 100 [IU]/ML
100 INJECTION, SOLUTION INTRAVENOUS; SUBCUTANEOUS
Qty: 3 | Refills: 0 | Status: ACTIVE | COMMUNITY
Start: 2025-07-11 | End: 1900-01-01

## 2025-07-15 RX ORDER — LANCETS 33 GAUGE
EACH MISCELLANEOUS
Qty: 2 | Refills: 0 | Status: ACTIVE | COMMUNITY
Start: 2025-07-11 | End: 1900-01-01

## 2025-07-15 RX ORDER — INSULIN PMP CART,AUT,G6/7,CNTR
EACH SUBCUTANEOUS
Qty: 3 | Refills: 3 | Status: ACTIVE | COMMUNITY
Start: 2025-07-11 | End: 1900-01-01

## 2025-07-16 ENCOUNTER — NON-APPOINTMENT (OUTPATIENT)
Age: 35
End: 2025-07-16

## 2025-07-17 ENCOUNTER — NON-APPOINTMENT (OUTPATIENT)
Age: 35
End: 2025-07-17

## 2025-07-18 ENCOUNTER — OUTPATIENT (OUTPATIENT)
Dept: OUTPATIENT SERVICES | Facility: HOSPITAL | Age: 35
LOS: 1 days | End: 2025-07-18
Payer: MEDICAID

## 2025-07-18 ENCOUNTER — LABORATORY RESULT (OUTPATIENT)
Age: 35
End: 2025-07-18

## 2025-07-18 ENCOUNTER — APPOINTMENT (OUTPATIENT)
Dept: MATERNAL FETAL MEDICINE | Facility: CLINIC | Age: 35
End: 2025-07-18

## 2025-07-18 VITALS
DIASTOLIC BLOOD PRESSURE: 72 MMHG | HEART RATE: 99 BPM | WEIGHT: 166 LBS | BODY MASS INDEX: 29.41 KG/M2 | SYSTOLIC BLOOD PRESSURE: 112 MMHG | OXYGEN SATURATION: 99 %

## 2025-07-18 DIAGNOSIS — O09.899 SUPERVISION OF OTHER HIGH RISK PREGNANCIES, UNSPECIFIED TRIMESTER: ICD-10-CM

## 2025-07-18 LAB
BILIRUB UR QL STRIP: NORMAL
GLUCOSE UR-MCNC: NORMAL
HCG UR QL: 0.2 EU/DL
HGB UR QL STRIP.AUTO: NORMAL
KETONES UR-MCNC: NORMAL
LEUKOCYTE ESTERASE UR QL STRIP: NORMAL
NITRITE UR QL STRIP: NORMAL
PH UR STRIP: 6
PROT UR STRIP-MCNC: NORMAL
SP GR UR STRIP: 1.02

## 2025-07-18 PROCEDURE — 82575 CREATININE CLEARANCE TEST: CPT

## 2025-07-18 PROCEDURE — 99213 OFFICE O/P EST LOW 20 MIN: CPT | Mod: TH,GC,25

## 2025-07-18 PROCEDURE — 82570 ASSAY OF URINE CREATININE: CPT

## 2025-07-18 PROCEDURE — G0108: CPT

## 2025-07-18 PROCEDURE — G0108 DIAB MANAGE TRN  PER INDIV: CPT

## 2025-07-18 PROCEDURE — G0463: CPT

## 2025-07-18 PROCEDURE — 84156 ASSAY OF PROTEIN URINE: CPT

## 2025-07-18 RX ORDER — ASPIRIN 81 MG/1
81 TABLET, DELAYED RELEASE ORAL DAILY
Qty: 60 | Refills: 0 | Status: ACTIVE | COMMUNITY
Start: 2025-07-18 | End: 1900-01-01

## 2025-07-19 LAB
BODY SURFACE AREA CALCULATION: 1.73 M2 — SIGNIFICANT CHANGE UP
COLLECT DURATION TIME UR: 24 HR — SIGNIFICANT CHANGE UP
CREAT 24H UR-MCNC: 72 MG/DL — SIGNIFICANT CHANGE UP
CREAT UR-MCNC: 72 MG/DL — SIGNIFICANT CHANGE UP
Lab: 111 MG/G — SIGNIFICANT CHANGE UP
Lab: 8 MG/DL — SIGNIFICANT CHANGE UP
PROT 24H UR-MRATE: 144 MG/24HR — SIGNIFICANT CHANGE UP
TOTAL VOLUME - URINE: 1800 ML — SIGNIFICANT CHANGE UP
URINE CREATININE CALCULATION: 1.3 G/24 HR — SIGNIFICANT CHANGE UP (ref 0.8–1.6)

## 2025-07-21 LAB — CREAT CL ?TM UR+SERPL-VRATE: SIGNIFICANT CHANGE UP (ref 75–115)

## 2025-07-22 ENCOUNTER — APPOINTMENT (OUTPATIENT)
Dept: MATERNAL FETAL MEDICINE | Facility: CLINIC | Age: 35
End: 2025-07-22
Payer: COMMERCIAL

## 2025-07-22 PROCEDURE — P0019: CPT

## 2025-07-23 ENCOUNTER — NON-APPOINTMENT (OUTPATIENT)
Age: 35
End: 2025-07-23

## 2025-07-24 ENCOUNTER — NON-APPOINTMENT (OUTPATIENT)
Age: 35
End: 2025-07-24

## 2025-07-25 DIAGNOSIS — O24.419 GESTATIONAL DIABETES MELLITUS IN PREGNANCY, UNSPECIFIED CONTROL: ICD-10-CM

## 2025-07-25 DIAGNOSIS — O20.9 HEMORRHAGE IN EARLY PREGNANCY, UNSPECIFIED: ICD-10-CM

## 2025-07-25 DIAGNOSIS — O09.90 SUPERVISION OF HIGH RISK PREGNANCY, UNSPECIFIED, UNSPECIFIED TRIMESTER: ICD-10-CM

## 2025-08-01 ENCOUNTER — APPOINTMENT (OUTPATIENT)
Dept: ANTEPARTUM | Facility: CLINIC | Age: 35
End: 2025-08-01
Payer: MEDICAID

## 2025-08-01 ENCOUNTER — OUTPATIENT (OUTPATIENT)
Dept: OUTPATIENT SERVICES | Facility: HOSPITAL | Age: 35
LOS: 1 days | End: 2025-08-01
Payer: MEDICAID

## 2025-08-01 ENCOUNTER — LABORATORY RESULT (OUTPATIENT)
Age: 35
End: 2025-08-01

## 2025-08-01 ENCOUNTER — APPOINTMENT (OUTPATIENT)
Dept: ENDOCRINOLOGY | Facility: CLINIC | Age: 35
End: 2025-08-01
Payer: MEDICAID

## 2025-08-01 ENCOUNTER — ASOB RESULT (OUTPATIENT)
Age: 35
End: 2025-08-01

## 2025-08-01 ENCOUNTER — APPOINTMENT (OUTPATIENT)
Dept: MATERNAL FETAL MEDICINE | Facility: CLINIC | Age: 35
End: 2025-08-01

## 2025-08-01 VITALS
WEIGHT: 140 LBS | SYSTOLIC BLOOD PRESSURE: 100 MMHG | BODY MASS INDEX: 24.8 KG/M2 | OXYGEN SATURATION: 98 % | HEIGHT: 63 IN | HEART RATE: 64 BPM | DIASTOLIC BLOOD PRESSURE: 52 MMHG

## 2025-08-01 VITALS
HEART RATE: 91 BPM | WEIGHT: 142.25 LBS | DIASTOLIC BLOOD PRESSURE: 51 MMHG | OXYGEN SATURATION: 99 % | HEIGHT: 63 IN | SYSTOLIC BLOOD PRESSURE: 101 MMHG | BODY MASS INDEX: 25.2 KG/M2

## 2025-08-01 DIAGNOSIS — M54.9 DORSALGIA, UNSPECIFIED: ICD-10-CM

## 2025-08-01 DIAGNOSIS — E10.9 TYPE 1 DIABETES MELLITUS W/OUT COMPLICATIONS: ICD-10-CM

## 2025-08-01 DIAGNOSIS — G89.29 DORSALGIA, UNSPECIFIED: ICD-10-CM

## 2025-08-01 DIAGNOSIS — D64.9 ANEMIA, UNSPECIFIED: ICD-10-CM

## 2025-08-01 DIAGNOSIS — E78.00 PURE HYPERCHOLESTEROLEMIA, UNSPECIFIED: ICD-10-CM

## 2025-08-01 DIAGNOSIS — R00.0 TACHYCARDIA, UNSPECIFIED: ICD-10-CM

## 2025-08-01 DIAGNOSIS — O09.899 SUPERVISION OF OTHER HIGH RISK PREGNANCIES, UNSPECIFIED TRIMESTER: ICD-10-CM

## 2025-08-01 DIAGNOSIS — E25.0 CONGENITAL ADRENOGENITAL DISORDERS ASSOCIATED WITH ENZYME DEFICIENCY: ICD-10-CM

## 2025-08-01 DIAGNOSIS — O09.90 SUPERVISION OF HIGH RISK PREGNANCY, UNSPECIFIED, UNSPECIFIED TRIMESTER: ICD-10-CM

## 2025-08-01 DIAGNOSIS — O24.019 PRE-EXISTING TYPE 1 DIABETES MELLITUS, IN PREGNANCY, UNSPECIFIED TRIMESTER: ICD-10-CM

## 2025-08-01 PROCEDURE — G0108 DIAB MANAGE TRN  PER INDIV: CPT

## 2025-08-01 PROCEDURE — 81003 URINALYSIS AUTO W/O SCOPE: CPT

## 2025-08-01 PROCEDURE — G0463: CPT

## 2025-08-01 PROCEDURE — G0108: CPT

## 2025-08-01 PROCEDURE — 76813 OB US NUCHAL MEAS 1 GEST: CPT | Mod: 26

## 2025-08-01 PROCEDURE — 76813 OB US NUCHAL MEAS 1 GEST: CPT

## 2025-08-01 PROCEDURE — G2211 COMPLEX E/M VISIT ADD ON: CPT | Mod: NC

## 2025-08-01 PROCEDURE — 99215 OFFICE O/P EST HI 40 MIN: CPT

## 2025-08-01 PROCEDURE — 99213 OFFICE O/P EST LOW 20 MIN: CPT | Mod: TH,GC,25

## 2025-08-11 ENCOUNTER — NON-APPOINTMENT (OUTPATIENT)
Age: 35
End: 2025-08-11

## 2025-08-11 ENCOUNTER — APPOINTMENT (OUTPATIENT)
Dept: MATERNAL FETAL MEDICINE | Facility: CLINIC | Age: 35
End: 2025-08-11

## 2025-08-11 LAB
17OHP SERPL-MCNC: 346 NG/DL
ANDROST SERPL-MCNC: 121 NG/DL
DHEA-SULFATE, SERUM: 72 UG/DL
SHBG SERPL-SCNC: 253 NMOL/L
TESTOST FREE SERPL-MCNC: 0.1 PG/ML
TESTOST SERPL-MCNC: 31.8 NG/DL

## 2025-08-15 ENCOUNTER — APPOINTMENT (OUTPATIENT)
Dept: ANTEPARTUM | Facility: CLINIC | Age: 35
End: 2025-08-15

## 2025-08-15 ENCOUNTER — OUTPATIENT (OUTPATIENT)
Dept: OUTPATIENT SERVICES | Facility: HOSPITAL | Age: 35
LOS: 1 days | End: 2025-08-15

## 2025-08-15 ENCOUNTER — APPOINTMENT (OUTPATIENT)
Dept: MATERNAL FETAL MEDICINE | Facility: CLINIC | Age: 35
End: 2025-08-15
Payer: MEDICAID

## 2025-08-15 ENCOUNTER — ASOB RESULT (OUTPATIENT)
Age: 35
End: 2025-08-15

## 2025-08-15 ENCOUNTER — LABORATORY RESULT (OUTPATIENT)
Age: 35
End: 2025-08-15

## 2025-08-15 VITALS
DIASTOLIC BLOOD PRESSURE: 60 MMHG | HEART RATE: 104 BPM | WEIGHT: 143.13 LBS | SYSTOLIC BLOOD PRESSURE: 98 MMHG | OXYGEN SATURATION: 99 % | HEIGHT: 63 IN | BODY MASS INDEX: 25.36 KG/M2

## 2025-08-15 DIAGNOSIS — O09.899 SUPERVISION OF OTHER HIGH RISK PREGNANCIES, UNSPECIFIED TRIMESTER: ICD-10-CM

## 2025-08-15 LAB
BILIRUB UR QL STRIP: NEGATIVE
GLUCOSE UR-MCNC: NEGATIVE
HCG UR QL: 1 EU/DL
HGB UR QL STRIP.AUTO: NEGATIVE
KETONES UR-MCNC: NEGATIVE
LEUKOCYTE ESTERASE UR QL STRIP: NEGATIVE
NITRITE UR QL STRIP: NEGATIVE
PH UR STRIP: 6
PROT UR STRIP-MCNC: NEGATIVE
SP GR UR STRIP: 1.02

## 2025-08-15 PROCEDURE — 76945 ECHO GUIDE VILLUS SAMPLING: CPT

## 2025-08-15 PROCEDURE — G0108 DIAB MANAGE TRN  PER INDIV: CPT

## 2025-08-15 PROCEDURE — 59015 CHORION BIOPSY: CPT

## 2025-08-15 PROCEDURE — 99213 OFFICE O/P EST LOW 20 MIN: CPT | Mod: TH,GC,25

## 2025-08-15 PROCEDURE — ZZZZZ: CPT

## 2025-08-25 ENCOUNTER — APPOINTMENT (OUTPATIENT)
Dept: CARDIOLOGY | Facility: CLINIC | Age: 35
End: 2025-08-25
Payer: MEDICAID

## 2025-08-25 ENCOUNTER — NON-APPOINTMENT (OUTPATIENT)
Age: 35
End: 2025-08-25

## 2025-08-25 VITALS
HEART RATE: 91 BPM | SYSTOLIC BLOOD PRESSURE: 95 MMHG | RESPIRATION RATE: 16 BRPM | OXYGEN SATURATION: 99 % | DIASTOLIC BLOOD PRESSURE: 64 MMHG | TEMPERATURE: 98 F

## 2025-08-25 DIAGNOSIS — Z91.89 OTHER SPECIFIED PERSONAL RISK FACTORS, NOT ELSEWHERE CLASSIFIED: ICD-10-CM

## 2025-08-25 DIAGNOSIS — E78.5 HYPERLIPIDEMIA, UNSPECIFIED: ICD-10-CM

## 2025-08-25 PROCEDURE — 93000 ELECTROCARDIOGRAM COMPLETE: CPT

## 2025-08-25 PROCEDURE — 99204 OFFICE O/P NEW MOD 45 MIN: CPT | Mod: 25

## 2025-08-29 ENCOUNTER — APPOINTMENT (OUTPATIENT)
Dept: ANTEPARTUM | Facility: CLINIC | Age: 35
End: 2025-08-29

## 2025-08-29 ENCOUNTER — LABORATORY RESULT (OUTPATIENT)
Age: 35
End: 2025-08-29

## 2025-08-29 ENCOUNTER — ASOB RESULT (OUTPATIENT)
Age: 35
End: 2025-08-29

## 2025-08-29 ENCOUNTER — APPOINTMENT (OUTPATIENT)
Dept: MATERNAL FETAL MEDICINE | Facility: CLINIC | Age: 35
End: 2025-08-29

## 2025-08-29 ENCOUNTER — OUTPATIENT (OUTPATIENT)
Dept: OUTPATIENT SERVICES | Facility: HOSPITAL | Age: 35
LOS: 1 days | End: 2025-08-29
Payer: MEDICAID

## 2025-08-29 VITALS
DIASTOLIC BLOOD PRESSURE: 59 MMHG | SYSTOLIC BLOOD PRESSURE: 100 MMHG | BODY MASS INDEX: 25.71 KG/M2 | HEART RATE: 88 BPM | WEIGHT: 145.13 LBS | OXYGEN SATURATION: 98 % | HEIGHT: 63 IN

## 2025-08-29 VITALS — DIASTOLIC BLOOD PRESSURE: 69 MMHG | SYSTOLIC BLOOD PRESSURE: 101 MMHG

## 2025-08-29 DIAGNOSIS — O09.899 SUPERVISION OF OTHER HIGH RISK PREGNANCIES, UNSPECIFIED TRIMESTER: ICD-10-CM

## 2025-08-29 DIAGNOSIS — O24.019 PRE-EXISTING TYPE 1 DIABETES MELLITUS, IN PREGNANCY, UNSPECIFIED TRIMESTER: ICD-10-CM

## 2025-08-29 DIAGNOSIS — O09.519 SUPERVISION OF ELDERLY PRIMIGRAVIDA, UNSPECIFIED TRIMESTER: ICD-10-CM

## 2025-08-29 DIAGNOSIS — O09.90 SUPERVISION OF HIGH RISK PREGNANCY, UNSPECIFIED, UNSPECIFIED TRIMESTER: ICD-10-CM

## 2025-08-29 LAB
BILIRUB UR QL STRIP: NEGATIVE
GLUCOSE UR-MCNC: NEGATIVE
HCG UR QL: 0.2 EU/DL
HGB UR QL STRIP.AUTO: NEGATIVE
KETONES UR-MCNC: NEGATIVE
LEUKOCYTE ESTERASE UR QL STRIP: NEGATIVE
NITRITE UR QL STRIP: NEGATIVE
PH UR STRIP: 6
PROT UR STRIP-MCNC: NEGATIVE
SP GR UR STRIP: 1.03

## 2025-08-29 PROCEDURE — 76805 OB US >/= 14 WKS SNGL FETUS: CPT | Mod: 26

## 2025-08-29 PROCEDURE — G0108: CPT

## 2025-08-29 PROCEDURE — 99213 OFFICE O/P EST LOW 20 MIN: CPT | Mod: TH,GC,25

## 2025-08-29 PROCEDURE — 36415 COLL VENOUS BLD VENIPUNCTURE: CPT

## 2025-08-29 PROCEDURE — 82105 ALPHA-FETOPROTEIN SERUM: CPT

## 2025-08-29 PROCEDURE — G0108 DIAB MANAGE TRN  PER INDIV: CPT

## 2025-08-29 PROCEDURE — 81003 URINALYSIS AUTO W/O SCOPE: CPT

## 2025-08-29 PROCEDURE — 76805 OB US >/= 14 WKS SNGL FETUS: CPT

## 2025-08-29 PROCEDURE — G0463: CPT

## 2025-09-02 ENCOUNTER — NON-APPOINTMENT (OUTPATIENT)
Age: 35
End: 2025-09-02

## 2025-09-02 LAB
AF-AFP MOM: 0.59 — SIGNIFICANT CHANGE UP
AFP CONCENTRATION: 19.57 NG/ML — SIGNIFICANT CHANGE UP
AFP INTERPRETATION: SIGNIFICANT CHANGE UP
AFP MOM CUT-OFF: 2.5 — SIGNIFICANT CHANGE UP
AFP PERCENTILE: 5.3 — SIGNIFICANT CHANGE UP
AFP SCREENING RESULT: SIGNIFICANT CHANGE UP
AFTER SCREENING RISK OPEN SPINA BIFIDA: SIGNIFICANT CHANGE UP
BEFORE SCREENING RISK OPEN SPINA BIFIDA: SIGNIFICANT CHANGE UP
EXTREME ANALYTE ALERT: NO — SIGNIFICANT CHANGE UP
GEN TEST INFORMATION: SIGNIFICANT CHANGE UP
MS GESTATIONAL AGE: SIGNIFICANT CHANGE UP
ORDER ENTRY INFORMATION: SIGNIFICANT CHANGE UP

## 2025-09-04 DIAGNOSIS — O99.011 ANEMIA COMPLICATING PREGNANCY, FIRST TRIMESTER: ICD-10-CM

## 2025-09-04 DIAGNOSIS — O09.521 SUPERVISION OF ELDERLY MULTIGRAVIDA, FIRST TRIMESTER: ICD-10-CM

## 2025-09-04 DIAGNOSIS — O09.90 SUPERVISION OF HIGH RISK PREGNANCY, UNSPECIFIED, UNSPECIFIED TRIMESTER: ICD-10-CM

## 2025-09-04 DIAGNOSIS — O24.019 PRE-EXISTING TYPE 1 DIABETES MELLITUS, IN PREGNANCY, UNSPECIFIED TRIMESTER: ICD-10-CM

## 2025-09-04 DIAGNOSIS — Z3A.15 15 WEEKS GESTATION OF PREGNANCY: ICD-10-CM

## 2025-09-04 DIAGNOSIS — O09.519 SUPERVISION OF ELDERLY PRIMIGRAVIDA, UNSPECIFIED TRIMESTER: ICD-10-CM

## 2025-09-04 DIAGNOSIS — Z14.8 GENETIC CARRIER OF OTHER DISEASE: ICD-10-CM

## 2025-09-12 ENCOUNTER — APPOINTMENT (OUTPATIENT)
Dept: MATERNAL FETAL MEDICINE | Facility: CLINIC | Age: 35
End: 2025-09-12

## 2025-09-15 ENCOUNTER — RX RENEWAL (OUTPATIENT)
Age: 35
End: 2025-09-15

## 2025-09-15 RX ORDER — ASPIRIN 81 MG/1
81 TABLET, COATED ORAL
Qty: 50 | Refills: 0 | Status: ACTIVE | COMMUNITY
Start: 2025-09-15 | End: 1900-01-01

## 2025-09-16 ENCOUNTER — APPOINTMENT (OUTPATIENT)
Dept: MATERNAL FETAL MEDICINE | Facility: CLINIC | Age: 35
End: 2025-09-16

## 2025-09-16 ENCOUNTER — MED ADMIN CHARGE (OUTPATIENT)
Age: 35
End: 2025-09-16

## 2025-09-16 VITALS
HEART RATE: 88 BPM | BODY MASS INDEX: 26.42 KG/M2 | HEIGHT: 63 IN | WEIGHT: 149.13 LBS | DIASTOLIC BLOOD PRESSURE: 66 MMHG | OXYGEN SATURATION: 99 % | SYSTOLIC BLOOD PRESSURE: 100 MMHG

## 2025-09-16 DIAGNOSIS — D64.9 ANEMIA, UNSPECIFIED: ICD-10-CM

## 2025-09-16 DIAGNOSIS — R25.2 CRAMP AND SPASM: ICD-10-CM

## 2025-09-16 DIAGNOSIS — E25.0 CONGENITAL ADRENOGENITAL DISORDERS ASSOCIATED WITH ENZYME DEFICIENCY: ICD-10-CM

## 2025-09-16 DIAGNOSIS — M54.9 DORSALGIA, UNSPECIFIED: ICD-10-CM

## 2025-09-16 DIAGNOSIS — G89.29 DORSALGIA, UNSPECIFIED: ICD-10-CM

## 2025-09-16 LAB
BILIRUB UR QL STRIP: NORMAL
GLUCOSE UR-MCNC: NORMAL
HCG UR QL: 1 EU/DL
HGB UR QL STRIP.AUTO: NORMAL
KETONES UR-MCNC: NORMAL
LEUKOCYTE ESTERASE UR QL STRIP: NORMAL
NITRITE UR QL STRIP: NORMAL
PH UR STRIP: 6
PROT UR STRIP-MCNC: NORMAL
SP GR UR STRIP: 1.02

## 2025-09-17 ENCOUNTER — APPOINTMENT (OUTPATIENT)
Dept: PEDIATRIC CARDIOLOGY | Facility: CLINIC | Age: 35
End: 2025-09-17

## 2025-09-17 PROBLEM — R25.2 LEG CRAMPING: Status: ACTIVE | Noted: 2025-09-17

## 2025-09-17 RX ORDER — MAGNESIUM 200 MG
200 TABLET ORAL
Qty: 60 | Refills: 0 | Status: ACTIVE | COMMUNITY
Start: 2025-09-17 | End: 1900-01-01

## 2025-09-18 ENCOUNTER — RX RENEWAL (OUTPATIENT)
Age: 35
End: 2025-09-18